# Patient Record
Sex: MALE | Race: WHITE | NOT HISPANIC OR LATINO | Employment: OTHER | URBAN - NONMETROPOLITAN AREA
[De-identification: names, ages, dates, MRNs, and addresses within clinical notes are randomized per-mention and may not be internally consistent; named-entity substitution may affect disease eponyms.]

---

## 2022-03-24 ENCOUNTER — TELEPHONE (OUTPATIENT)
Dept: FAMILY MEDICINE CLINIC | Facility: CLINIC | Age: 77
End: 2022-03-24

## 2022-03-28 DIAGNOSIS — I10 PRIMARY HYPERTENSION: Primary | ICD-10-CM

## 2022-03-28 RX ORDER — PANTOPRAZOLE SODIUM 40 MG/1
TABLET, DELAYED RELEASE ORAL
Qty: 90 TABLET | Refills: 0 | Status: SHIPPED | OUTPATIENT
Start: 2022-03-28 | End: 2022-05-03 | Stop reason: SDUPTHER

## 2022-03-28 RX ORDER — CELECOXIB 200 MG/1
CAPSULE ORAL
Qty: 60 CAPSULE | Refills: 0 | Status: SHIPPED | OUTPATIENT
Start: 2022-03-28 | End: 2022-05-03 | Stop reason: SDUPTHER

## 2022-03-28 RX ORDER — AMLODIPINE AND VALSARTAN 10; 320 MG/1; MG/1
TABLET ORAL
Qty: 30 TABLET | Refills: 0 | Status: SHIPPED | OUTPATIENT
Start: 2022-03-28 | End: 2022-05-03 | Stop reason: SDUPTHER

## 2022-05-03 ENCOUNTER — OFFICE VISIT (OUTPATIENT)
Dept: FAMILY MEDICINE CLINIC | Facility: CLINIC | Age: 77
End: 2022-05-03

## 2022-05-03 VITALS
DIASTOLIC BLOOD PRESSURE: 90 MMHG | HEIGHT: 70 IN | SYSTOLIC BLOOD PRESSURE: 150 MMHG | HEART RATE: 95 BPM | OXYGEN SATURATION: 98 % | BODY MASS INDEX: 36.94 KG/M2 | WEIGHT: 258 LBS

## 2022-05-03 DIAGNOSIS — I10 PRIMARY HYPERTENSION: ICD-10-CM

## 2022-05-03 DIAGNOSIS — R73.09 ELEVATED GLUCOSE: ICD-10-CM

## 2022-05-03 DIAGNOSIS — F32.A ANXIETY AND DEPRESSION: ICD-10-CM

## 2022-05-03 DIAGNOSIS — I25.118 CORONARY ARTERY DISEASE OF NATIVE ARTERY OF NATIVE HEART WITH STABLE ANGINA PECTORIS: ICD-10-CM

## 2022-05-03 DIAGNOSIS — M15.9 PRIMARY OSTEOARTHRITIS INVOLVING MULTIPLE JOINTS: ICD-10-CM

## 2022-05-03 DIAGNOSIS — E78.2 MIXED HYPERLIPIDEMIA: Primary | ICD-10-CM

## 2022-05-03 DIAGNOSIS — F41.9 ANXIETY AND DEPRESSION: ICD-10-CM

## 2022-05-03 DIAGNOSIS — G47.00 PERSISTENT INSOMNIA: ICD-10-CM

## 2022-05-03 DIAGNOSIS — K21.9 CHRONIC GERD: ICD-10-CM

## 2022-05-03 PROBLEM — M15.0 PRIMARY OSTEOARTHRITIS INVOLVING MULTIPLE JOINTS: Status: ACTIVE | Noted: 2022-05-03

## 2022-05-03 PROCEDURE — 99214 OFFICE O/P EST MOD 30 MIN: CPT | Performed by: PHYSICIAN ASSISTANT

## 2022-05-03 RX ORDER — CELECOXIB 200 MG/1
CAPSULE ORAL
Qty: 90 CAPSULE | Refills: 1 | Status: SHIPPED | OUTPATIENT
Start: 2022-05-03 | End: 2022-08-19

## 2022-05-03 RX ORDER — ATORVASTATIN CALCIUM 40 MG/1
TABLET, FILM COATED ORAL
Qty: 90 TABLET | Refills: 1 | Status: SHIPPED | OUTPATIENT
Start: 2022-05-03

## 2022-05-03 RX ORDER — PANTOPRAZOLE SODIUM 40 MG/1
40 TABLET, DELAYED RELEASE ORAL DAILY
Qty: 90 TABLET | Refills: 1 | Status: SHIPPED | OUTPATIENT
Start: 2022-05-03

## 2022-05-03 RX ORDER — ACETAMINOPHEN 160 MG
2000 TABLET,DISINTEGRATING ORAL DAILY
COMMUNITY

## 2022-05-03 RX ORDER — CELECOXIB 200 MG/1
200 CAPSULE ORAL DAILY
COMMUNITY
End: 2022-05-03 | Stop reason: SDUPTHER

## 2022-05-03 RX ORDER — BUSPIRONE HYDROCHLORIDE 10 MG/1
10 TABLET ORAL EVERY 12 HOURS SCHEDULED
COMMUNITY
End: 2022-05-03 | Stop reason: SDUPTHER

## 2022-05-03 RX ORDER — SERTRALINE HYDROCHLORIDE 100 MG/1
TABLET, FILM COATED ORAL
Qty: 90 TABLET | Refills: 1 | Status: SHIPPED | OUTPATIENT
Start: 2022-05-03 | End: 2022-08-19

## 2022-05-03 RX ORDER — ATORVASTATIN CALCIUM 40 MG/1
40 TABLET, FILM COATED ORAL DAILY
COMMUNITY
End: 2022-05-03 | Stop reason: SDUPTHER

## 2022-05-03 RX ORDER — AMITRIPTYLINE HYDROCHLORIDE 25 MG/1
TABLET, FILM COATED ORAL
Qty: 90 TABLET | Refills: 1 | Status: SHIPPED | OUTPATIENT
Start: 2022-05-03

## 2022-05-03 RX ORDER — MONTELUKAST SODIUM 10 MG/1
10 TABLET ORAL DAILY
COMMUNITY

## 2022-05-03 RX ORDER — BUSPIRONE HYDROCHLORIDE 10 MG/1
TABLET ORAL
Qty: 180 TABLET | Refills: 1 | Status: SHIPPED | OUTPATIENT
Start: 2022-05-03

## 2022-05-03 RX ORDER — ASPIRIN 81 MG/1
81 TABLET, CHEWABLE ORAL DAILY
COMMUNITY

## 2022-05-03 RX ORDER — PANTOPRAZOLE SODIUM 40 MG/1
40 TABLET, DELAYED RELEASE ORAL DAILY
COMMUNITY
End: 2022-05-03 | Stop reason: SDUPTHER

## 2022-05-03 RX ORDER — AMLODIPINE AND VALSARTAN 10; 320 MG/1; MG/1
TABLET ORAL
Qty: 90 TABLET | Refills: 1 | Status: SHIPPED | OUTPATIENT
Start: 2022-05-03

## 2022-05-03 RX ORDER — AMITRIPTYLINE HYDROCHLORIDE 10 MG/1
10 TABLET, FILM COATED ORAL DAILY
COMMUNITY
Start: 2022-01-31 | End: 2022-05-03 | Stop reason: SDUPTHER

## 2022-05-03 NOTE — PROGRESS NOTES
"Chief Complaint  Hypertension (Patient is needing a refill on medication)    Subjective          Kerrie Medrano presents to [facility]  Patient reports today for medication refills and bloodwork.    Patient reports HTN and elevated cholesterol states he takes his meds daily however does not check BP regularly.    Patient reports having arthritis states PRN celebrex helps request refills. Reports duloxetine for pain and mood and would like to continue    Patient also reports GERD states it is well contirolled.      Objective   Vital Signs:   /90 (BP Location: Right arm, Patient Position: Sitting, Cuff Size: Adult)   Pulse 95   Ht 177.8 cm (70\")   Wt 117 kg (258 lb)   SpO2 98%   BMI 37.02 kg/m²     Physical Exam  Vitals and nursing note reviewed.   Constitutional:       General: He is not in acute distress.     Appearance: He is obese.   HENT:      Head: Normocephalic.      Right Ear: Hearing normal.      Left Ear: Hearing normal.   Eyes:      Pupils: Pupils are equal, round, and reactive to light.   Cardiovascular:      Rate and Rhythm: Normal rate and regular rhythm.   Pulmonary:      Effort: Pulmonary effort is normal. No respiratory distress.   Skin:     General: Skin is warm and dry.   Neurological:      Mental Status: He is alert.   Psychiatric:         Mood and Affect: Mood normal.        Result Review :                 Assessment and Plan    Diagnoses and all orders for this visit:    1. Mixed hyperlipidemia (Primary)  Assessment & Plan:  Continue current treatment plan    Orders:  -     atorvastatin (LIPITOR) 40 MG tablet; Take 1 tab po daily for cholesterol  Dispense: 90 tablet; Refill: 1    2. Primary hypertension  Assessment & Plan:  Hypertension is elevated this date advised patient to start monitoring at home and RTC should it remain elevated    Orders:  -     amLODIPine-valsartan (EXFORGE)  MG per tablet; Take 1 tab po daily for blood pressure  Dispense: 90 " tablet; Refill: 1  -     CBC & Differential; Future  -     Comprehensive Metabolic Panel; Future  -     TSH; Future  -     Lipid Panel; Future  -     CBC & Differential  -     Comprehensive Metabolic Panel  -     TSH  -     Lipid Panel    3. Anxiety and depression  Assessment & Plan:  Patient's depression is recurrent and stable will continue current treatment plan    Orders:  -     sertraline (Zoloft) 100 MG tablet; Take 1 tab po daily for mood  Dispense: 90 tablet; Refill: 1  -     busPIRone (BUSPAR) 10 MG tablet; Take 1 tab po Bid for mood  Dispense: 180 tablet; Refill: 1  -     amitriptyline (ELAVIL) 25 MG tablet; Take 1 tab po QHS for sleep, caution sedation.  Dispense: 90 tablet; Refill: 1    4. Persistent insomnia  Assessment & Plan:  Continue current treatment plan    Orders:  -     amitriptyline (ELAVIL) 25 MG tablet; Take 1 tab po QHS for sleep, caution sedation.  Dispense: 90 tablet; Refill: 1    5. Chronic GERD  Assessment & Plan:  Continue current treatment plan    Orders:  -     pantoprazole (PROTONIX) 40 MG EC tablet; Take 1 tablet by mouth Daily.  Dispense: 90 tablet; Refill: 1    6. Primary osteoarthritis involving multiple joints  Assessment & Plan:  Continue current treatment plan    Orders:  -     celecoxib (CeleBREX) 200 MG capsule; Take 1 tab po daily with food for arthritis  Dispense: 90 capsule; Refill: 1    7. Elevated glucose  Assessment & Plan:  Will check A1c today    Orders:  -     Hemoglobin A1c; Future  -     Hemoglobin A1c    8. Coronary artery disease of native artery of native heart with stable angina pectoris (HCC)  Assessment & Plan:  Continue current treatment plan               Follow Up   No follow-ups on file.  Patient was given instructions and counseling regarding his condition or for health maintenance advice. Please see specific information pulled into the AVS if appropriate.

## 2022-05-04 LAB
ALBUMIN SERPL-MCNC: 4.4 G/DL (ref 3.7–4.7)
ALBUMIN/GLOB SERPL: 1.8 {RATIO} (ref 1.2–2.2)
ALP SERPL-CCNC: 89 IU/L (ref 44–121)
ALT SERPL-CCNC: 35 IU/L (ref 0–44)
AST SERPL-CCNC: 32 IU/L (ref 0–40)
BASOPHILS # BLD AUTO: 0.1 X10E3/UL (ref 0–0.2)
BASOPHILS NFR BLD AUTO: 1 %
BILIRUB SERPL-MCNC: 0.9 MG/DL (ref 0–1.2)
BUN SERPL-MCNC: 11 MG/DL (ref 8–27)
BUN/CREAT SERPL: 11 (ref 10–24)
CALCIUM SERPL-MCNC: 9.5 MG/DL (ref 8.6–10.2)
CHLORIDE SERPL-SCNC: 103 MMOL/L (ref 96–106)
CHOLEST SERPL-MCNC: 213 MG/DL (ref 100–199)
CO2 SERPL-SCNC: 20 MMOL/L (ref 20–29)
CREAT SERPL-MCNC: 0.99 MG/DL (ref 0.76–1.27)
EGFRCR SERPLBLD CKD-EPI 2021: 79 ML/MIN/1.73
EOSINOPHIL # BLD AUTO: 0.6 X10E3/UL (ref 0–0.4)
EOSINOPHIL NFR BLD AUTO: 11 %
ERYTHROCYTE [DISTWIDTH] IN BLOOD BY AUTOMATED COUNT: 13.1 % (ref 11.6–15.4)
GLOBULIN SER CALC-MCNC: 2.4 G/DL (ref 1.5–4.5)
GLUCOSE SERPL-MCNC: 123 MG/DL (ref 65–99)
HBA1C MFR BLD: 6.2 % (ref 4.8–5.6)
HCT VFR BLD AUTO: 48.4 % (ref 37.5–51)
HDLC SERPL-MCNC: 46 MG/DL
HGB BLD-MCNC: 16.7 G/DL (ref 13–17.7)
IMM GRANULOCYTES # BLD AUTO: 0 X10E3/UL (ref 0–0.1)
IMM GRANULOCYTES NFR BLD AUTO: 0 %
LDLC SERPL CALC-MCNC: 134 MG/DL (ref 0–99)
LYMPHOCYTES # BLD AUTO: 1.6 X10E3/UL (ref 0.7–3.1)
LYMPHOCYTES NFR BLD AUTO: 27 %
MCH RBC QN AUTO: 30.4 PG (ref 26.6–33)
MCHC RBC AUTO-ENTMCNC: 34.5 G/DL (ref 31.5–35.7)
MCV RBC AUTO: 88 FL (ref 79–97)
MONOCYTES # BLD AUTO: 0.6 X10E3/UL (ref 0.1–0.9)
MONOCYTES NFR BLD AUTO: 11 %
NEUTROPHILS # BLD AUTO: 2.9 X10E3/UL (ref 1.4–7)
NEUTROPHILS NFR BLD AUTO: 50 %
PLATELET # BLD AUTO: 164 X10E3/UL (ref 150–450)
POTASSIUM SERPL-SCNC: 4.2 MMOL/L (ref 3.5–5.2)
PROT SERPL-MCNC: 6.8 G/DL (ref 6–8.5)
RBC # BLD AUTO: 5.49 X10E6/UL (ref 4.14–5.8)
SODIUM SERPL-SCNC: 140 MMOL/L (ref 134–144)
TRIGL SERPL-MCNC: 184 MG/DL (ref 0–149)
TSH SERPL DL<=0.005 MIU/L-ACNC: 2.9 UIU/ML (ref 0.45–4.5)
VLDLC SERPL CALC-MCNC: 33 MG/DL (ref 5–40)
WBC # BLD AUTO: 5.8 X10E3/UL (ref 3.4–10.8)

## 2022-05-06 NOTE — ASSESSMENT & PLAN NOTE
Hypertension is elevated this date advised patient to start monitoring at home and RTC should it remain elevated

## 2022-08-19 DIAGNOSIS — M15.9 PRIMARY OSTEOARTHRITIS INVOLVING MULTIPLE JOINTS: ICD-10-CM

## 2022-08-19 DIAGNOSIS — F32.A ANXIETY AND DEPRESSION: ICD-10-CM

## 2022-08-19 DIAGNOSIS — F41.9 ANXIETY AND DEPRESSION: ICD-10-CM

## 2022-08-19 RX ORDER — SERTRALINE HYDROCHLORIDE 100 MG/1
TABLET, FILM COATED ORAL
Qty: 90 TABLET | Refills: 1 | Status: SHIPPED | OUTPATIENT
Start: 2022-08-19

## 2022-08-19 RX ORDER — CELECOXIB 200 MG/1
CAPSULE ORAL
Qty: 90 CAPSULE | Refills: 1 | Status: SHIPPED | OUTPATIENT
Start: 2022-08-19

## 2023-01-30 ENCOUNTER — EXTERNAL PBMM DATA (OUTPATIENT)
Dept: PHARMACY | Facility: OTHER | Age: 78
End: 2023-01-30
Payer: MEDICARE

## 2023-02-27 ENCOUNTER — EXTERNAL PBMM DATA (OUTPATIENT)
Dept: PHARMACY | Facility: OTHER | Age: 78
End: 2023-02-27
Payer: MEDICARE

## 2023-06-07 DIAGNOSIS — M15.9 PRIMARY OSTEOARTHRITIS INVOLVING MULTIPLE JOINTS: ICD-10-CM

## 2023-06-07 DIAGNOSIS — F32.A ANXIETY AND DEPRESSION: ICD-10-CM

## 2023-06-07 DIAGNOSIS — F41.9 ANXIETY AND DEPRESSION: ICD-10-CM

## 2023-06-07 RX ORDER — SERTRALINE HYDROCHLORIDE 100 MG/1
TABLET, FILM COATED ORAL
Qty: 30 TABLET | Refills: 0 | Status: SHIPPED | OUTPATIENT
Start: 2023-06-07

## 2023-06-07 RX ORDER — CELECOXIB 200 MG/1
CAPSULE ORAL
Qty: 30 CAPSULE | Refills: 0 | Status: SHIPPED | OUTPATIENT
Start: 2023-06-07

## 2023-06-07 NOTE — TELEPHONE ENCOUNTER
Caller: SIRI SELF    Relationship: Emergency Contact    Best call back number: [phone]    Requested Prescriptions:   Requested Prescriptions     Pending Prescriptions Disp Refills    sertraline (ZOLOFT) 100 MG tablet 90 tablet 1     Sig: TAKE ONE TABLET BY MOUTH DAILY FOR MOOD    celecoxib (CeleBREX) 200 MG capsule 90 capsule 1     Sig: TAKE ONE CAPSULE BY MOUTH DAILY WITH FOOD FOR ARTHRITIS        Pharmacy where request should be sent:  [id number] - [place], [place] - [address] AT [facility] [address] - [phone] [facility] [phone] FX        Last office visit with prescribing clinician: Visit date not found   Last telemedicine visit with prescribing clinician: Visit date not found   Next office visit with prescribing clinician: Visit date not found         Does the patient have less than a 3 day supply:  [x] Yes  [] No    Would you like a call back once the refill request has been completed: [] Yes [x] No    If the office needs to give you a call back, can they leave a voicemail: [] Yes [x] No    Dez Richardson   06/07/23 10:09 EDT

## 2023-07-03 PROBLEM — F32.A ANXIETY AND DEPRESSION: Chronic | Status: ACTIVE | Noted: 2022-05-03

## 2023-07-03 PROBLEM — E66.01 CLASS 3 SEVERE OBESITY DUE TO EXCESS CALORIES WITH SERIOUS COMORBIDITY AND BODY MASS INDEX (BMI) OF 40.0 TO 44.9 IN ADULT: Status: ACTIVE | Noted: 2023-07-03

## 2023-07-03 PROBLEM — R73.9 HYPERGLYCEMIA: Status: ACTIVE | Noted: 2023-07-03

## 2023-07-03 PROBLEM — G47.00 PERSISTENT INSOMNIA: Chronic | Status: ACTIVE | Noted: 2022-05-03

## 2023-07-03 PROBLEM — M15.9 PRIMARY OSTEOARTHRITIS INVOLVING MULTIPLE JOINTS: Chronic | Status: ACTIVE | Noted: 2022-05-03

## 2023-07-03 PROBLEM — I10 PRIMARY HYPERTENSION: Chronic | Status: ACTIVE | Noted: 2022-05-03

## 2023-07-03 PROBLEM — Z12.11 SCREENING FOR COLON CANCER: Status: ACTIVE | Noted: 2023-07-03

## 2023-07-03 PROBLEM — K21.9 CHRONIC GERD: Chronic | Status: ACTIVE | Noted: 2022-05-03

## 2023-07-03 PROBLEM — R73.9 HYPERGLYCEMIA: Chronic | Status: ACTIVE | Noted: 2023-07-03

## 2023-07-03 PROBLEM — R73.09 ELEVATED GLUCOSE: Status: RESOLVED | Noted: 2022-05-03 | Resolved: 2023-07-03

## 2023-07-03 PROBLEM — I25.118 CORONARY ARTERY DISEASE OF NATIVE ARTERY OF NATIVE HEART WITH STABLE ANGINA PECTORIS: Chronic | Status: ACTIVE | Noted: 2022-05-03

## 2023-07-03 PROBLEM — E66.813 CLASS 3 SEVERE OBESITY DUE TO EXCESS CALORIES WITH SERIOUS COMORBIDITY AND BODY MASS INDEX (BMI) OF 40.0 TO 44.9 IN ADULT: Status: ACTIVE | Noted: 2023-07-03

## 2023-07-03 PROBLEM — F41.9 ANXIETY AND DEPRESSION: Chronic | Status: ACTIVE | Noted: 2022-05-03

## 2023-07-03 PROBLEM — Z12.5 PROSTATE CANCER SCREENING: Status: ACTIVE | Noted: 2023-07-03

## 2023-07-03 PROBLEM — E78.2 MIXED HYPERLIPIDEMIA: Chronic | Status: ACTIVE | Noted: 2022-05-03

## 2023-07-03 PROBLEM — Z00.00 GENERAL MEDICAL EXAM: Status: ACTIVE | Noted: 2023-07-03

## 2023-07-03 PROBLEM — Z11.59 NEED FOR HEPATITIS C SCREENING TEST: Status: ACTIVE | Noted: 2023-07-03

## 2023-07-03 PROBLEM — M15.0 PRIMARY OSTEOARTHRITIS INVOLVING MULTIPLE JOINTS: Chronic | Status: ACTIVE | Noted: 2022-05-03

## 2023-07-31 ENCOUNTER — OFFICE VISIT (OUTPATIENT)
Dept: FAMILY MEDICINE CLINIC | Facility: CLINIC | Age: 78
End: 2023-07-31
Payer: MEDICARE

## 2023-07-31 VITALS
OXYGEN SATURATION: 93 % | HEART RATE: 70 BPM | BODY MASS INDEX: 40.12 KG/M2 | HEIGHT: 71 IN | DIASTOLIC BLOOD PRESSURE: 82 MMHG | WEIGHT: 286.6 LBS | SYSTOLIC BLOOD PRESSURE: 150 MMHG

## 2023-07-31 DIAGNOSIS — G89.29 CHRONIC PAIN OF RIGHT KNEE: ICD-10-CM

## 2023-07-31 DIAGNOSIS — M25.561 CHRONIC PAIN OF RIGHT KNEE: ICD-10-CM

## 2023-07-31 DIAGNOSIS — E66.01 CLASS 3 SEVERE OBESITY DUE TO EXCESS CALORIES WITH SERIOUS COMORBIDITY AND BODY MASS INDEX (BMI) OF 40.0 TO 44.9 IN ADULT: ICD-10-CM

## 2023-07-31 DIAGNOSIS — E11.9 TYPE 2 DIABETES MELLITUS WITHOUT COMPLICATION, WITHOUT LONG-TERM CURRENT USE OF INSULIN: Primary | ICD-10-CM

## 2023-07-31 DIAGNOSIS — G47.00 PERSISTENT INSOMNIA: ICD-10-CM

## 2023-07-31 DIAGNOSIS — I10 PRIMARY HYPERTENSION: Chronic | ICD-10-CM

## 2023-07-31 DIAGNOSIS — E78.2 MIXED HYPERLIPIDEMIA: Chronic | ICD-10-CM

## 2023-07-31 DIAGNOSIS — I25.118 CORONARY ARTERY DISEASE OF NATIVE ARTERY OF NATIVE HEART WITH STABLE ANGINA PECTORIS: Chronic | ICD-10-CM

## 2023-07-31 LAB
EXPIRATION DATE: NORMAL
Lab: NORMAL
POC MICROALBUMIN URINE: NORMAL

## 2023-07-31 PROCEDURE — 1160F RVW MEDS BY RX/DR IN RCRD: CPT | Performed by: FAMILY MEDICINE

## 2023-07-31 PROCEDURE — 3079F DIAST BP 80-89 MM HG: CPT | Performed by: FAMILY MEDICINE

## 2023-07-31 PROCEDURE — 3077F SYST BP >= 140 MM HG: CPT | Performed by: FAMILY MEDICINE

## 2023-07-31 PROCEDURE — 1159F MED LIST DOCD IN RCRD: CPT | Performed by: FAMILY MEDICINE

## 2023-07-31 PROCEDURE — 82044 UR ALBUMIN SEMIQUANTITATIVE: CPT | Performed by: FAMILY MEDICINE

## 2023-07-31 PROCEDURE — 99214 OFFICE O/P EST MOD 30 MIN: CPT | Performed by: FAMILY MEDICINE

## 2023-07-31 RX ORDER — METFORMIN HYDROCHLORIDE 500 MG/1
1000 TABLET, EXTENDED RELEASE ORAL
Qty: 180 TABLET | Refills: 1 | Status: SHIPPED | OUTPATIENT
Start: 2023-07-31

## 2023-07-31 RX ORDER — TRAZODONE HYDROCHLORIDE 50 MG/1
50-150 TABLET ORAL NIGHTLY
Qty: 90 TABLET | Refills: 3 | Status: SHIPPED | OUTPATIENT
Start: 2023-07-31

## 2023-08-15 ENCOUNTER — OFFICE VISIT (OUTPATIENT)
Dept: ORTHOPEDIC SURGERY | Facility: CLINIC | Age: 78
End: 2023-08-15
Payer: MEDICARE

## 2023-08-15 VITALS
SYSTOLIC BLOOD PRESSURE: 125 MMHG | WEIGHT: 286.6 LBS | BODY MASS INDEX: 40.12 KG/M2 | DIASTOLIC BLOOD PRESSURE: 84 MMHG | HEIGHT: 71 IN

## 2023-08-15 DIAGNOSIS — M17.11 PRIMARY OSTEOARTHRITIS OF RIGHT KNEE: ICD-10-CM

## 2023-08-15 DIAGNOSIS — M25.561 RIGHT KNEE PAIN, UNSPECIFIED CHRONICITY: Primary | ICD-10-CM

## 2023-08-15 DIAGNOSIS — E66.01 OBESITY, MORBID, BMI 40.0-49.9: ICD-10-CM

## 2023-08-15 NOTE — PROGRESS NOTES
[facility] Office Visit     Office Visit       Date: 08/15/2023   Patient Name: Kerrie Medrano  MRN: [id number]  YOB: 1945    Referring Physician: Jermaine Lopez,*     Chief Complaint:   Chief Complaint   Patient presents with    Right Knee - Pain, Initial Evaluation       History of Present Illness:   Kerrie Medrano is a 77 y.o. male who presents with right knee pain for 1 year(s). Onset atraumatic and gradual in nature. Pain is localized to the medial joint line, lateral joint line and is a 8/10 on the pain scale. Pain is described as aching. Associated symptoms include pain and popping. The pain is worse with walking, climbing stairs, lying on affected side, and rising from seated position; resting make it better. Previous treatments have included: cane/walker and NSAIDS since symptom onset. Although some transient relief was reported with these interventions, these conservative measures have failed and symptoms have persisted. The patient is limited in daily activities and has had a significant decrease in quality of life as a result. He denies fevers, chills, or constitutional symptoms. Patient is a not a smoker, chews tobacco.     Subjective   Review of Systems: Review of Systems   Constitutional:  Negative for chills, fever, unexpected weight gain and unexpected weight loss.   HENT:  Negative for congestion, postnasal drip and rhinorrhea.    Eyes:  Negative for blurred vision.   Respiratory:  Negative for shortness of breath.    Cardiovascular:  Negative for leg swelling.   Gastrointestinal:  Negative for abdominal pain, nausea and vomiting.   Genitourinary:  Negative for difficulty urinating.   Musculoskeletal:  Positive for arthralgias. Negative for gait problem, joint swelling and myalgias.   Skin:  Negative for skin lesions and wound.   Neurological:  Negative for dizziness, weakness, light-headedness and numbness.   Hematological:   Does not bruise/bleed easily.   Psychiatric/Behavioral:  Negative for depressed mood.    All other systems reviewed and are negative.     I have reviewed the following portions of the patient's history:History of Present Illness and review of systems.    Past Medical History:   Past Medical History:   Diagnosis Date    Benign essential hypertension     Disorder of hair and hair follicles     Joint pain        Past Surgical History:   Past Surgical History:   Procedure Laterality Date    APPENDECTOMY  1960    COLONOSCOPY  04/2009    COLON POLYP-20 CM INFLAMMATORY POLYP, PLAN: REPEAT IN 5 YEARS, NO FH OF COLON CANCER    KNEE SURGERY      ARTHROCENTESIS OF THE RIGHT KNEE MEDIAL JOINT LINE  AND LEFT KNEE    SKIN CANCER EXCISION         Family History:   Family History   Problem Relation Age of Onset    Heart attack Mother     Heart attack Father     Skin cancer Brother 50    Heart attack Brother         X3       Social History:   Social History     Socioeconomic History    Marital status:    Tobacco Use    Smoking status: Never     Passive exposure: Never    Smokeless tobacco: Never   Vaping Use    Vaping Use: Never used   Substance and Sexual Activity    Alcohol use: Never    Drug use: Never    Sexual activity: Defer       Medications:   Current Outpatient Medications:     amLODIPine-valsartan (EXFORGE)  MG per tablet, Take 1 tablet by mouth Daily. Take 1 tab po daily for blood pressure, Disp: 90 tablet, Rfl: 1    aspirin 81 MG chewable tablet, Chew 1 tablet Daily., Disp: , Rfl:     atorvastatin (LIPITOR) 40 MG tablet, Take 1 tab po daily for cholesterol, Disp: 90 tablet, Rfl: 1    busPIRone (BUSPAR) 10 MG tablet, Take 1 tab po Bid for mood, Disp: 180 tablet, Rfl: 1    celecoxib (CeleBREX) 200 MG capsule, TAKE ONE CAPSULE BY MOUTH DAILY WITH FOOD FOR ARTHRITIS, Disp: 90 capsule, Rfl: 1    Cholecalciferol (Vitamin D3) 50 MCG (2000 UT) capsule, Take 1 capsule by mouth Daily., Disp: , Rfl:     metFORMIN ER  "(GLUCOPHAGE-XR) 500 MG 24 hr tablet, Take 2 tablets by mouth Daily With Dinner., Disp: 180 tablet, Rfl: 1    montelukast (SINGULAIR) 10 MG tablet, Take 1 tablet by mouth Every Night., Disp: 90 tablet, Rfl: 1    pantoprazole (PROTONIX) 40 MG EC tablet, Take 1 tablet by mouth Daily., Disp: 90 tablet, Rfl: 1    sertraline (ZOLOFT) 100 MG tablet, TAKE ONE TABLET BY MOUTH DAILY FOR MOOD, Disp: 90 tablet, Rfl: 1    traZODone (DESYREL) 50 MG tablet, Take 1-3 tablets by mouth Every Night. For insomnia (Replaces elavil), Disp: 90 tablet, Rfl: 3    Trelegy Ellipta 100-62.5-25 MCG/INH inhaler, , Disp: , Rfl:     Allergies: No Known Allergies    I reviewed the patient's chief complaint, history of present illness, review of systems, past medical history, surgical history, family history, social history, medications and allergy list.     Objective    Vital Signs:   Vitals:    08/15/23 1512   BP: 125/84   Weight: 130 kg (286 lb 9.6 oz)   Height: 179.1 cm (70.51\")     Body mass index is 40.53 kg/mý. Class 3 Severe Obesity (BMI >=40). Obesity-related health conditions include the following: hypertension, coronary heart disease, diabetes mellitus, and dyslipidemias. Obesity is newly identified. BMI is is above average; BMI management plan is completed. We discussed portion control and increasing exercise.     Patient reports that he is a non-smoker and has not ever been a smoker.  This behavior was applauded and he was encouraged to continue in smoking cessation.  We will continue to monitor at subsequent visits.     Ortho Exam:  right knee: No erythema, ecchymosis, swelling.  Tender to palpation over the medial joint line.  Full range of motion in flexion extension but pain is experienced with deep knee flexion.  Can get to 0 degrees extension, 110 degrees in flexion.  Stable to varus and valgus stress.  Negative anterior posterior drawer.  Negative Keila's.  Sensation intact to light touch.  5/5 strength.  2+ posterior tibial " pulse.    Results Review:   Imaging Results (Last 24 Hours)       Procedure Component Value Units Date/Time    XR Knee 4+ View Right [[id number]] Resulted: 08/15/23 1456     Updated: 08/15/23 1511        I personally reviewed the radiographs of the right knee.  No acute fracture or dislocation.  There is tricompartmental osteoarthritis with severe narrowing of the medial joint space.    Procedures    Assessment / Plan    Assessment/Plan:   Diagnoses and all orders for this visit:    1. Right knee pain, unspecified chronicity (Primary)  -     XR Knee 4+ View Right    2. Primary osteoarthritis of right knee    3. Obesity, morbid, BMI 40.0-49.9      Worsening right knee pain for the last 1 year.  He localizes pain along the medial joint line.  His radiographs show severe narrowing of the medial joint space.  He has had the left knee replaced about 6 years ago.  He has had a corticosteroid injection about 1 year ago that only provided 3 to 4 days of relief.  Celebrex has not been helping either.  I provided him with a copy of his radiographs and we discussed them in detail.  I have laid out for him a treatment plan from both the nonoperative and operative standpoint.  Based off the severity of his knee arthritis, I am less than optimistic that continued injections with steroid or hyaluronic acid, medications, bracing, or physical therapy would significantly alter his pain level at this time.  I did discuss with him that total knee arthroplasty would be the only way to fully correct the arthritis.  His BMI is currently greater than 40 and that might limit his ability to have the procedure.  I encouraged him to watch his weight and do his best to get below that 40 threshold.  After this discussion, he has elected to be referred for surgical evaluation.  I will arrange for him to see Dr. Whitley in follow-up.  Should he decide to not proceed with surgery, I am happy to see him back for nonoperative management.    Previous  documentation reviewed: 7/31/2023-Jermaine Lopez MD-office visit.    Previous laboratory results reviewed: 7/3/2023-creatinine 1.04, EGFR 74.  Hemoglobin A1c 7.0%.    Follow Up:   Return for F/U with Angi.      Butch Kim MD  [facility]

## 2023-08-24 ENCOUNTER — PREP FOR SURGERY (OUTPATIENT)
Dept: OTHER | Facility: HOSPITAL | Age: 78
End: 2023-08-24
Payer: MEDICARE

## 2023-08-24 ENCOUNTER — OFFICE VISIT (OUTPATIENT)
Dept: ORTHOPEDIC SURGERY | Facility: CLINIC | Age: 78
End: 2023-08-24
Payer: MEDICARE

## 2023-08-24 VITALS — SYSTOLIC BLOOD PRESSURE: 142 MMHG | DIASTOLIC BLOOD PRESSURE: 78 MMHG

## 2023-08-24 DIAGNOSIS — M17.11 PRIMARY OSTEOARTHRITIS OF RIGHT KNEE: Primary | ICD-10-CM

## 2023-08-24 DIAGNOSIS — E11.9 TYPE 2 DIABETES MELLITUS WITHOUT COMPLICATION, WITHOUT LONG-TERM CURRENT USE OF INSULIN: Chronic | ICD-10-CM

## 2023-08-24 DIAGNOSIS — E66.01 CLASS 3 SEVERE OBESITY WITH BODY MASS INDEX (BMI) OF 40.0 TO 44.9 IN ADULT, UNSPECIFIED OBESITY TYPE, UNSPECIFIED WHETHER SERIOUS COMORBIDITY PRESENT: ICD-10-CM

## 2023-08-24 PROBLEM — M17.9 OA (OSTEOARTHRITIS) OF KNEE: Status: ACTIVE | Noted: 2023-08-24

## 2023-08-24 RX ORDER — OXYCODONE HCL 10 MG/1
10 TABLET, FILM COATED, EXTENDED RELEASE ORAL ONCE
OUTPATIENT
Start: 2023-08-24 | End: 2023-08-24

## 2023-08-24 RX ORDER — ACETAMINOPHEN 500 MG
1000 TABLET ORAL ONCE
OUTPATIENT
Start: 2023-08-24 | End: 2023-08-24

## 2023-08-24 RX ORDER — TRANEXAMIC ACID 10 MG/ML
1000 INJECTION, SOLUTION INTRAVENOUS ONCE
OUTPATIENT
Start: 2023-08-24 | End: 2023-08-24

## 2023-08-24 RX ORDER — CEFAZOLIN SODIUM IN 0.9 % NACL 3 G/100 ML
3 INTRAVENOUS SOLUTION, PIGGYBACK (ML) INTRAVENOUS ONCE
OUTPATIENT
Start: 2023-08-24 | End: 2023-08-24

## 2023-08-24 RX ORDER — CHLORHEXIDINE GLUCONATE 4 G/100ML
SOLUTION TOPICAL DAILY
Qty: 236 ML | Refills: 0 | Status: SHIPPED | OUTPATIENT
Start: 2023-08-24

## 2023-08-24 NOTE — PROGRESS NOTES
[facility] Note        Subjective     Pain of the Right Knee      HPI    Kerrie Medrano is a 77 y.o. male.  Patient is here today with his daughter and grandson for evaluation of right knee pain.  He is referred by Dr. Kim.  Patient tells me that he has significant pain with walking.  He underwent left TKA by Dr. Red in [place] about 6 years ago he estimates.  He is a 1 pouch a day chewer of tobacco.  He does not smoke cigarettes.  He is on oxygen at night.  He is a non-insulin-dependent diabetic.  Patient is here today to discuss possible arthroplasty.  He has failed steroid and viscosupplementation injections.  He lives by himself.  Has a daughter that lives in [facility] who is accompanying him today.  Also has another daughter that lives a few doors away from him.        Past Medical History:   Diagnosis Date    Benign essential hypertension     Disorder of hair and hair follicles     Joint pain       Past Surgical History:   Procedure Laterality Date    APPENDECTOMY  1960    COLONOSCOPY  04/2009    COLON POLYP-20 CM INFLAMMATORY POLYP, PLAN: REPEAT IN 5 YEARS, NO FH OF COLON CANCER    KNEE SURGERY      ARTHROCENTESIS OF THE RIGHT KNEE MEDIAL JOINT LINE  AND LEFT KNEE    SKIN CANCER EXCISION        Family History   Problem Relation Age of Onset    Heart attack Mother     Heart attack Father     Skin cancer Brother 50    Heart attack Brother         X3     Social History     Socioeconomic History    Marital status:    Tobacco Use    Smoking status: Never     Passive exposure: Never    Smokeless tobacco: Never   Vaping Use    Vaping Use: Never used   Substance and Sexual Activity    Alcohol use: Never    Drug use: Never    Sexual activity: Defer      Current Outpatient Medications on File Prior to Visit   Medication Sig Dispense Refill    amLODIPine-valsartan (EXFORGE)  MG per tablet Take 1 tablet by mouth Daily. Take 1 tab po daily for blood pressure 90 tablet 1     aspirin 81 MG chewable tablet Chew 1 tablet Daily.      atorvastatin (LIPITOR) 40 MG tablet Take 1 tab po daily for cholesterol 90 tablet 1    busPIRone (BUSPAR) 10 MG tablet Take 1 tab po Bid for mood 180 tablet 1    celecoxib (CeleBREX) 200 MG capsule TAKE ONE CAPSULE BY MOUTH DAILY WITH FOOD FOR ARTHRITIS 90 capsule 1    Cholecalciferol (Vitamin D3) 50 MCG (2000 UT) capsule Take 1 capsule by mouth Daily.      metFORMIN ER (GLUCOPHAGE-XR) 500 MG 24 hr tablet Take 2 tablets by mouth Daily With Dinner. 180 tablet 1    montelukast (SINGULAIR) 10 MG tablet Take 1 tablet by mouth Every Night. 90 tablet 1    pantoprazole (PROTONIX) 40 MG EC tablet Take 1 tablet by mouth Daily. 90 tablet 1    sertraline (ZOLOFT) 100 MG tablet TAKE ONE TABLET BY MOUTH DAILY FOR MOOD 90 tablet 1    traZODone (DESYREL) 50 MG tablet Take 1-3 tablets by mouth Every Night. For insomnia (Replaces elavil) 90 tablet 3    Trelegy Ellipta 100-62.5-25 MCG/INH inhaler        No current facility-administered medications on file prior to visit.      No Known Allergies       Review of Systems   Constitutional: Negative.    HENT: Negative.     Eyes: Negative.    Respiratory: Negative.     Cardiovascular: Negative.    Gastrointestinal: Negative.    Endocrine: Negative.    Genitourinary: Negative.    Musculoskeletal: Negative.    Skin: Negative.    Allergic/Immunologic: Negative.    Neurological: Negative.    Hematological: Negative.    Psychiatric/Behavioral: Negative.        I reviewed the patient's chief complaint, history of present illness, review of systems, past medical history, surgical history, family history, social history, medications and allergy list.        Objective      Physical Exam  /78     There is no height or weight on file to calculate BMI.    General  Mental Status - alert  General Appearance - cooperative, well groomed, not in acute distress  Orientation - Oriented X3  Build & Nutrition - well developed and well  nourished  Posture - normal posture  Gait - normal gait       Ortho Exam      Musculoskeletal:  Global Assessment:  Overall assessment of Lower Extremity Muscle Strength and Tone:  Right quadriceps--5/5   Right hamstrings--5/5       Right tibialis anterior--5/5  Right gastroc-soleus--5/5  Right EHL --5/5    Lower Extremity:    Inspection and Palpation:  Right knee:  Tenderness:  Over the medial joint line and moderate severity  Effusion:  1+  Crepitus:  Positive  Pulses:  2+  Ecchymosis:  None  Warmth:  None     ROM:  Right:  Extension: 5    Flexion:120    Instability:    Right:  Lachman Test:  Negative   Varus stress test negative   Valgus stress test negative    Deformities/Malalignments/Discrepancies:    Left:  No deformities   Right:  Genu Varum    Functional Testing:  Keila's test:  Negative  Patella grind test:  Positive  Q-angle:  normal        Imaging/Studies  Imaging Results (Last 24 Hours)       ** No results found for the last 24 hours. **          XR Knee 4+ View Right  Narrative: XR KNEE 4+ VW RIGHT    Date of Exam: 8/15/2023 1:56 PM CDT    Indication: PAIN    Comparison: None available.    Findings:  No evidence of fracture nor dislocation. Alignment is normal. Mild to moderate medial patellofemoral compartment degenerative arthrosis. No significant effusion identified. Soft tissues are unremarkable.  Impression: Impression:  Mild to moderate degenerative arthrosis    Electronically Signed: Nicolás Gutierrez MD    8/18/2023 6:57 AM CDT    Workstation ID: [id number]       We have reviewed an x-ray of the patient's right knee from 8/18/2023 which shows end-stage medial compartment arthritis.  There is moderate patellofemoral compartment arthritis.    Hemoglobin A1c from 7/3/2023 is 7.0    Assessment    Assessment:  1. Primary osteoarthritis of right knee    2. Class 3 severe obesity with body mass index (BMI) of 40.0 to 44.9 in adult, unspecified obesity type, unspecified whether serious comorbidity present     3. Type 2 diabetes mellitus without complication, without long-term current use of insulin        Plan:  Continue over-the-counter medication as needed for discomfort  Severe right knee arthritis in a patient with hemoglobin A1c of 7.0, home O2 dependency at night, and history of tobacco use--patient has exhausted nonoperative treatment modalities.  He is still at significant risk for perioperative complications.  I like to go ahead and get perioperative clearance by his PCP and also by his pulmonologist Dr. Valdivia.  We will go ahead and request formal clearance from them both.  I will see the patient back in the office to answer any final questions.  We will likely do the surgery with a 23-hour stay, aspirin for DVT prophylaxis, and we will need to make sure he has appropriate arrangements postoperatively for care with his daughters.  Patient and his family was informed of the fact that robotics will be used at the time of surgery to assist.  We will get x-rays of his right knee when he returns to schedule surgery.        Trae Whitley MD  08/24/23  15:02 EDT      Dictated Utilizing Dragon Dictation.

## 2023-10-10 ENCOUNTER — OFFICE VISIT (OUTPATIENT)
Dept: FAMILY MEDICINE CLINIC | Facility: CLINIC | Age: 78
End: 2023-10-10
Payer: MEDICARE

## 2023-10-10 VITALS
HEIGHT: 71 IN | OXYGEN SATURATION: 96 % | WEIGHT: 290.7 LBS | DIASTOLIC BLOOD PRESSURE: 90 MMHG | TEMPERATURE: 98 F | SYSTOLIC BLOOD PRESSURE: 148 MMHG | HEART RATE: 96 BPM | BODY MASS INDEX: 40.7 KG/M2

## 2023-10-10 DIAGNOSIS — M17.11 PRIMARY OSTEOARTHRITIS OF RIGHT KNEE: ICD-10-CM

## 2023-10-10 DIAGNOSIS — G89.29 CHRONIC PAIN OF RIGHT KNEE: Primary | ICD-10-CM

## 2023-10-10 DIAGNOSIS — M25.561 CHRONIC PAIN OF RIGHT KNEE: Primary | ICD-10-CM

## 2023-10-10 DIAGNOSIS — Z01.810 PREOP CARDIOVASCULAR EXAM: ICD-10-CM

## 2023-10-10 DIAGNOSIS — R35.0 URINARY FREQUENCY: ICD-10-CM

## 2023-10-10 DIAGNOSIS — I10 PRIMARY HYPERTENSION: Chronic | ICD-10-CM

## 2023-10-10 DIAGNOSIS — E11.9 TYPE 2 DIABETES MELLITUS WITHOUT COMPLICATION, WITHOUT LONG-TERM CURRENT USE OF INSULIN: Chronic | ICD-10-CM

## 2023-10-10 DIAGNOSIS — R94.31 ABNORMAL EKG: ICD-10-CM

## 2023-10-10 DIAGNOSIS — E78.2 MIXED HYPERLIPIDEMIA: Chronic | ICD-10-CM

## 2023-10-10 LAB
BILIRUB BLD-MCNC: NEGATIVE MG/DL
CLARITY, POC: CLEAR
COLOR UR: YELLOW
EXPIRATION DATE: NORMAL
GLUCOSE UR STRIP-MCNC: NEGATIVE MG/DL
KETONES UR QL: NEGATIVE
LEUKOCYTE EST, POC: NEGATIVE
Lab: NORMAL
NITRITE UR-MCNC: NEGATIVE MG/ML
PH UR: 7 [PH] (ref 5–8)
PROT UR STRIP-MCNC: NEGATIVE MG/DL
RBC # UR STRIP: NEGATIVE /UL
SP GR UR: 1.01 (ref 1–1.03)
UROBILINOGEN UR QL: NORMAL

## 2023-10-10 NOTE — PROGRESS NOTES
"Chief Complaint  Pre-op Exam (Having knee replacement )    Subjective    History of Present Illness:  Kerrie Medrano is a 77 y.o. male who presents today for preop eval for R knee replacement with Dr Red.    Planned surgery 11/7/23.  He did well with L knee replacement with Dr Red in the past    No chest pain or chest pressure.  He did have pulmonology clearance with Dr. Valdivia prior to today's visit but no chest x-ray was done.  He is willing to get a chest x-ray today.    We will then get nonfasting lab work checked and has been doing better with compliance with his statin after his most recent labs did show poor lipid control.    He has not had any problems with anesthesia in the past.    He is walking with a cane given his worsening right knee osteoarthritis and blood pressure has been running a little higher due to pain.  He does understand need to stop Celebrex at least 1 week prior to surgery and avoid aspirin to prevent bleeding complications.        Objective   Vital Signs:   /90   Pulse 96   Temp 98 øF (36.7 øC)   Ht 179.1 cm (70.51\")   Wt 132 kg (290 lb 11.2 oz)   SpO2 96%   BMI 41.11 kg/mý     Review of Systems   Constitutional:  Negative for appetite change, chills and fever.   HENT:  Negative for hearing loss.    Eyes:  Negative for blurred vision.   Respiratory:  Negative for chest tightness.    Cardiovascular:  Negative for chest pain.   Gastrointestinal:  Negative for abdominal pain.   Musculoskeletal:  Positive for arthralgias and gait problem.   Skin:  Negative for rash.   Psychiatric/Behavioral:  Negative for depressed mood.        Past History:  Medical History: has a past medical history of Benign essential hypertension, Disorder of hair and hair follicles, and Joint pain.   Surgical History: has a past surgical history that includes Colonoscopy (04/2009); Skin cancer excision; Knee surgery; and Appendectomy (1960).   Family History: family history includes Heart attack in his " brother, father, and mother; Skin cancer (age of onset: 50) in his brother.   Social History: reports that he has never smoked. He has never been exposed to tobacco smoke. He has never used smokeless tobacco. He reports that he does not drink alcohol and does not use drugs.      Current Outpatient Medications:     amLODIPine-valsartan (EXFORGE)  MG per tablet, Take 1 tablet by mouth Daily. Take 1 tab po daily for blood pressure, Disp: 90 tablet, Rfl: 1    aspirin 81 MG chewable tablet, Chew 1 tablet Daily., Disp: , Rfl:     atorvastatin (LIPITOR) 40 MG tablet, Take 1 tab po daily for cholesterol, Disp: 90 tablet, Rfl: 1    busPIRone (BUSPAR) 10 MG tablet, Take 1 tab po Bid for mood, Disp: 180 tablet, Rfl: 1    celecoxib (CeleBREX) 200 MG capsule, TAKE ONE CAPSULE BY MOUTH DAILY WITH FOOD FOR ARTHRITIS, Disp: 90 capsule, Rfl: 1    chlorhexidine (HIBICLENS) 4 % external liquid, Apply  topically to the appropriate area as directed Daily. Shower w/ solution for 5 days before surgery. [facility] to [facility] to be filled., Disp: 236 mL, Rfl: 0    Cholecalciferol (Vitamin D3) 50 MCG (2000 UT) capsule, Take 1 capsule by mouth Daily., Disp: , Rfl:     metFORMIN ER (GLUCOPHAGE-XR) 500 MG 24 hr tablet, Take 2 tablets by mouth Daily With Dinner., Disp: 180 tablet, Rfl: 1    montelukast (SINGULAIR) 10 MG tablet, Take 1 tablet by mouth Every Night., Disp: 90 tablet, Rfl: 1    pantoprazole (PROTONIX) 40 MG EC tablet, Take 1 tablet by mouth Daily., Disp: 90 tablet, Rfl: 1    sertraline (ZOLOFT) 100 MG tablet, TAKE ONE TABLET BY MOUTH DAILY FOR MOOD, Disp: 90 tablet, Rfl: 1    traZODone (DESYREL) 50 MG tablet, Take 1-3 tablets by mouth Every Night. For insomnia (Replaces elavil), Disp: 90 tablet, Rfl: 3    Trelegy Ellipta 100-62.5-25 MCG/INH inhaler, , Disp: , Rfl:     Allergies: Patient has no known allergies.    Physical Exam  Constitutional:       Appearance: Normal appearance. He is obese.   HENT:      Head: Normocephalic.       Right Ear: External ear normal.      Left Ear: External ear normal.      Nose: Nose normal.   Eyes:      Pupils: Pupils are equal, round, and reactive to light.   Cardiovascular:      Rate and Rhythm: Normal rate and regular rhythm.      Heart sounds: Normal heart sounds.   Pulmonary:      Effort: Pulmonary effort is normal.      Breath sounds: Normal breath sounds.   Musculoskeletal:      Cervical back: Normal range of motion.      Comments: Worsening right knee crepitus and osteoarthritic changes.  Using cane for ambulatory support   Skin:     General: Skin is warm and dry.   Neurological:      General: No focal deficit present.      Mental Status: He is alert.   Psychiatric:         Mood and Affect: Mood normal.         Behavior: Behavior normal.         Thought Content: Thought content normal.          Result Review            ECG 12 Lead    Date/Time: 10/10/2023 2:49 PM  Performed by: Jermaine Lopez MD    Authorized by: Jermaine Lopez MD  Comparison: compared with previous ECG from 9/8/2021  Rhythm: sinus arrhythmia  Rate: normal  BPM: 96  Conduction: right bundle branch block  QRS axis: right    Clinical impression: abnormal EKG  Comments: RBBB unclear p-waves with irregularity concerning for possible new Afib.  See note.  Rate is not uncontrolled.  Continues ASA and would like to see Cardiology before full anticoagulation decision and needs preop clearance.               Assessment and Plan  Diagnoses and all orders for this visit:    1. Chronic pain of right knee (Primary)  Assessment & Plan:  Discussed worsening knee osteoarthritis and plans for total knee replacement with Dr. Red.    We did obtain preop EKG today with more pronounced right bundle su block compared to his EKG from September 2021 at [facility].    He has not seen his cardiologist in some time after Dr. Candelaria moved out of the area.  He has not had any fluttering or palpitation symptoms but we  did discuss some concerns based upon his EKG given inability to clearly identify P waves and the possibility of atrial fibrillation.  His wife did have atrial fibrillation so is familiar with A-fib concerns.    We did discuss further work-up needed with cardiology given unclear EKG with some findings concerning for atrial fibrillation versus right bundle branch block with sinus arrhythmia.    He is willing to get preop clearance with cardiology and we will work to facilitate a appointment with cardiology at the earliest available time given plans for surgery in November.    He does understand the risks and benefits of surgery as explained by his surgeon and wishes to proceed.    Awaiting cardiology clearance given abnormal preop EKG findings with some concerns for possibility of new A-fib.    Discussed if he has any chest pain or chest pressure or palpitation symptoms that he does need further evaluation in the ER.  We did discuss the unclear nature of his EKG and the option to start anticoagulation at this point he would just like to continue with his current aspirin and his rate is not uncontrolled.  He does understand stroke risk with A-fib.            2. Primary osteoarthritis of right knee  Assessment & Plan:  See above      3. Preop cardiovascular exam  Assessment & Plan:  See above     Needs cardiology eval and clearance given concerns regarding abnormalities on preop EKG    Asymptomatic     Orders:  -     CBC Auto Differential; Future  -     Comprehensive Metabolic Panel; Future  -     Lipid Panel; Future  -     Hemoglobin A1c; Future  -     XR Chest 2 View; Future  -     Ambulatory Referral to Cardiology  -     CBC Auto Differential  -     Comprehensive Metabolic Panel  -     Lipid Panel  -     Hemoglobin A1c  -     ECG 12 Lead    4. Primary hypertension  -     CBC Auto Differential; Future  -     Comprehensive Metabolic Panel; Future  -     Lipid Panel; Future  -     TSH; Future  -     T4, Free; Future  -      Magnesium; Future  -     CBC Auto Differential  -     Comprehensive Metabolic Panel  -     Lipid Panel  -     TSH  -     T4, Free  -     Magnesium    5. Mixed hyperlipidemia  -     CBC Auto Differential; Future  -     Comprehensive Metabolic Panel; Future  -     Lipid Panel; Future  -     CBC Auto Differential  -     Comprehensive Metabolic Panel  -     Lipid Panel    6. Type 2 diabetes mellitus without complication, without long-term current use of insulin  -     CBC Auto Differential; Future  -     Comprehensive Metabolic Panel; Future  -     Lipid Panel; Future  -     Hemoglobin A1c; Future  -     CBC Auto Differential  -     Comprehensive Metabolic Panel  -     Lipid Panel  -     Hemoglobin A1c    7. Urinary frequency  Assessment & Plan:  No dysuria    UA clear      Orders:  -     POCT urinalysis dipstick, automated  -     Urine Culture - Urine, Urine, Clean Catch; Future  -     Urine Culture - Urine, Urine, Clean Catch    8. Abnormal EKG  Assessment & Plan:  See above     Orders:  -     Ambulatory Referral to Cardiology  -     TSH; Future  -     T4, Free; Future  -     Magnesium; Future  -     TSH  -     T4, Free  -     Magnesium                   Follow Up  Return in about 8 weeks (around 12/4/2023), or if symptoms worsen or fail to improve, for Med recheck.    Jermaine Lopez MD

## 2023-10-10 NOTE — ASSESSMENT & PLAN NOTE
Discussed worsening knee osteoarthritis and plans for total knee replacement with Dr. Red.    We did obtain preop EKG today with more pronounced right bundle su block compared to his EKG from September 2021 at [facility].    He has not seen his cardiologist in some time after Dr. Candelaria moved out of the area.  He has not had any fluttering or palpitation symptoms but we did discuss some concerns based upon his EKG given inability to clearly identify P waves and the possibility of atrial fibrillation.  His wife did have atrial fibrillation so is familiar with A-fib concerns.    We did discuss further work-up needed with cardiology given unclear EKG with some findings concerning for atrial fibrillation versus right bundle branch block with sinus arrhythmia.    He is willing to get preop clearance with cardiology and we will work to facilitate a appointment with cardiology at the earliest available time given plans for surgery in November.    He does understand the risks and benefits of surgery as explained by his surgeon and wishes to proceed.    Awaiting cardiology clearance given abnormal preop EKG findings with some concerns for possibility of new A-fib.    Discussed if he has any chest pain or chest pressure or palpitation symptoms that he does need further evaluation in the ER.  We did discuss the unclear nature of his EKG and the option to start anticoagulation at this point he would just like to continue with his current aspirin and his rate is not uncontrolled.  He does understand stroke risk with A-fib.

## 2023-10-10 NOTE — ASSESSMENT & PLAN NOTE
See above     Needs cardiology eval and clearance given concerns regarding abnormalities on preop EKG    Asymptomatic

## 2023-10-11 ENCOUNTER — OFFICE VISIT (OUTPATIENT)
Dept: CARDIOLOGY | Facility: CLINIC | Age: 78
End: 2023-10-11
Payer: MEDICARE

## 2023-10-11 VITALS
OXYGEN SATURATION: 99 % | BODY MASS INDEX: 40.6 KG/M2 | DIASTOLIC BLOOD PRESSURE: 81 MMHG | RESPIRATION RATE: 18 BRPM | HEIGHT: 71 IN | WEIGHT: 290 LBS | HEART RATE: 102 BPM | SYSTOLIC BLOOD PRESSURE: 150 MMHG

## 2023-10-11 DIAGNOSIS — E11.9 TYPE 2 DIABETES MELLITUS WITHOUT COMPLICATION, WITHOUT LONG-TERM CURRENT USE OF INSULIN: Chronic | ICD-10-CM

## 2023-10-11 DIAGNOSIS — I48.91 ATRIAL FIBRILLATION WITH RVR: ICD-10-CM

## 2023-10-11 DIAGNOSIS — R06.09 DYSPNEA ON EXERTION: ICD-10-CM

## 2023-10-11 DIAGNOSIS — E78.2 MIXED HYPERLIPIDEMIA: Chronic | ICD-10-CM

## 2023-10-11 DIAGNOSIS — Z01.810 PRE-OPERATIVE CARDIOVASCULAR EXAMINATION: Primary | ICD-10-CM

## 2023-10-11 DIAGNOSIS — I10 PRIMARY HYPERTENSION: Chronic | ICD-10-CM

## 2023-10-11 DIAGNOSIS — I25.118 CORONARY ARTERY DISEASE OF NATIVE ARTERY OF NATIVE HEART WITH STABLE ANGINA PECTORIS: Chronic | ICD-10-CM

## 2023-10-11 PROBLEM — I45.10 RBBB: Status: ACTIVE | Noted: 2023-10-11

## 2023-10-11 LAB
ALBUMIN SERPL-MCNC: 4.2 G/DL (ref 3.8–4.8)
ALBUMIN/GLOB SERPL: 1.5 {RATIO} (ref 1.2–2.2)
ALP SERPL-CCNC: 88 IU/L (ref 44–121)
ALT SERPL-CCNC: 19 IU/L (ref 0–44)
AST SERPL-CCNC: 20 IU/L (ref 0–40)
BACTERIA UR CULT: NORMAL
BACTERIA UR CULT: NORMAL
BASOPHILS # BLD AUTO: 0.1 X10E3/UL (ref 0–0.2)
BASOPHILS NFR BLD AUTO: 1 %
BILIRUB SERPL-MCNC: 1 MG/DL (ref 0–1.2)
BUN SERPL-MCNC: 14 MG/DL (ref 8–27)
BUN/CREAT SERPL: 13 (ref 10–24)
CALCIUM SERPL-MCNC: 9.1 MG/DL (ref 8.6–10.2)
CHLORIDE SERPL-SCNC: 102 MMOL/L (ref 96–106)
CHOLEST SERPL-MCNC: 188 MG/DL (ref 100–199)
CO2 SERPL-SCNC: 22 MMOL/L (ref 20–29)
CREAT SERPL-MCNC: 1.11 MG/DL (ref 0.76–1.27)
EGFRCR SERPLBLD CKD-EPI 2021: 69 ML/MIN/1.73
EOSINOPHIL # BLD AUTO: 0.3 X10E3/UL (ref 0–0.4)
EOSINOPHIL NFR BLD AUTO: 5 %
ERYTHROCYTE [DISTWIDTH] IN BLOOD BY AUTOMATED COUNT: 13.2 % (ref 11.6–15.4)
GLOBULIN SER CALC-MCNC: 2.8 G/DL (ref 1.5–4.5)
GLUCOSE SERPL-MCNC: 116 MG/DL (ref 70–99)
HBA1C MFR BLD: 7 % (ref 4.8–5.6)
HCT VFR BLD AUTO: 48.7 % (ref 37.5–51)
HDLC SERPL-MCNC: 49 MG/DL
HGB BLD-MCNC: 16.2 G/DL (ref 13–17.7)
IMM GRANULOCYTES # BLD AUTO: 0 X10E3/UL (ref 0–0.1)
IMM GRANULOCYTES NFR BLD AUTO: 0 %
LDLC SERPL CALC-MCNC: 118 MG/DL (ref 0–99)
LYMPHOCYTES # BLD AUTO: 1.8 X10E3/UL (ref 0.7–3.1)
LYMPHOCYTES NFR BLD AUTO: 28 %
MAGNESIUM SERPL-MCNC: 2.2 MG/DL (ref 1.6–2.3)
MCH RBC QN AUTO: 29.5 PG (ref 26.6–33)
MCHC RBC AUTO-ENTMCNC: 33.3 G/DL (ref 31.5–35.7)
MCV RBC AUTO: 89 FL (ref 79–97)
MONOCYTES # BLD AUTO: 0.6 X10E3/UL (ref 0.1–0.9)
MONOCYTES NFR BLD AUTO: 9 %
NEUTROPHILS # BLD AUTO: 3.8 X10E3/UL (ref 1.4–7)
NEUTROPHILS NFR BLD AUTO: 57 %
PLATELET # BLD AUTO: 142 X10E3/UL (ref 150–450)
POTASSIUM SERPL-SCNC: 4.5 MMOL/L (ref 3.5–5.2)
PROT SERPL-MCNC: 7 G/DL (ref 6–8.5)
RBC # BLD AUTO: 5.5 X10E6/UL (ref 4.14–5.8)
SODIUM SERPL-SCNC: 140 MMOL/L (ref 134–144)
T4 FREE SERPL-MCNC: 1.1 NG/DL (ref 0.82–1.77)
TRIGL SERPL-MCNC: 114 MG/DL (ref 0–149)
TSH SERPL DL<=0.005 MIU/L-ACNC: 3.93 UIU/ML (ref 0.45–4.5)
VLDLC SERPL CALC-MCNC: 21 MG/DL (ref 5–40)
WBC # BLD AUTO: 6.5 X10E3/UL (ref 3.4–10.8)

## 2023-10-11 RX ORDER — EZETIMIBE 10 MG/1
10 TABLET ORAL DAILY
Qty: 90 TABLET | Refills: 3 | Status: SHIPPED | OUTPATIENT
Start: 2023-10-11

## 2023-10-11 RX ORDER — ATORVASTATIN CALCIUM 80 MG/1
TABLET, FILM COATED ORAL
Qty: 90 TABLET | Refills: 2 | Status: SHIPPED | OUTPATIENT
Start: 2023-10-11

## 2023-10-11 NOTE — PROGRESS NOTES
[facility] [place]  [facility]  [address] [place] [address]  Dept: [phone]  Dept Fax: [phone]    Kerrie Medrano  1945    New Patient Office Note    History of Present Illness:  Kerrie Medrano is a 77 y.o. male who presents to the clinic for New Patient, Abnormal EKG, preoperative cardiovascular clearance total knee replacement. He has PMH significant for HTN, HLP, CAD, GERD, DM, COPD. He denies smoking, Etoh,drugs. Family history includes Mother- by MI age 64. Father  by MI in 60's.     He presents today as referral from PCP for  pre operative left total knee clearance Dr. Red and abnormal EKG revealing RBBB atrial flutter. Today EKG is unchanged Atrial flutter with RVR, Hr 102, RBBB, compared to historical record atrial flutter is new finding and he denies known history. He does have complaints of feeling heart quiver today, also states he feels short of breath especially when he is active, otherwise he denies all cardiac complaints. He has known CAD, moderate by cath , PDA 50%, LAD 30-40%, 1st dx 50%, on ASA, review of historical echo normal Ef, mild MAC, RV dilation, LAE, mild TR. Known HTN, HLP, DM. BP today is elevated 142/86 on recheck, on Exforge , congruent with his PCP reading from yesterday. Review of labs , trig 267 on Lipitor 40. Exam today irregularly irregular rhythm, Hr 102 otherwise stable vitals, trace edema. At this time given his known history and abnormal rhythm today will proceed with echo, Lexiscan nuclear 2 day as he cannot walk given musculoskeletal complaint. Add Eliquis 5 bid, chdsvasc 5, also Toprol 25 bid. Increase Lipitor to 80, add zetia. Further recommendation on surgical clearance given test results and follow up exam.     Patient given verbal and written instructions per AVS, he cannot read, daughter lisandro called and made aware of changes, new medications and diagnosis and treatment.   Eliquis LOT  [id number] Exp June 2025, 1 month given while working out financials/patient assistance.     The following portions of the patient's history were reviewed and updated as appropriate: allergies, current medications, past family history, past medical history, past social history, past surgical history, and problem list.    Medications:  amLODIPine-valsartan  apixaban tablet  aspirin  atorvastatin  busPIRone  chlorhexidine  ezetimibe  metFORMIN ER  metoprolol tartrate  montelukast  pantoprazole  sertraline  traZODone  Trelegy Ellipta aerosol powder   Vitamin D3    Subjective  No Known Allergies     Past Medical History:   Diagnosis Date    Benign essential hypertension     Disorder of hair and hair follicles     Joint pain        Past Surgical History:   Procedure Laterality Date    APPENDECTOMY  1960    COLONOSCOPY  04/2009    COLON POLYP-20 CM INFLAMMATORY POLYP, PLAN: REPEAT IN 5 YEARS, NO FH OF COLON CANCER    KNEE SURGERY      ARTHROCENTESIS OF THE RIGHT KNEE MEDIAL JOINT LINE  AND LEFT KNEE    SKIN CANCER EXCISION         Family History   Problem Relation Age of Onset    Heart attack Mother     Heart attack Father     Skin cancer Brother 50    Heart attack Brother         X3        Social History     Socioeconomic History    Marital status:    Tobacco Use    Smoking status: Never     Passive exposure: Never    Smokeless tobacco: Never   Vaping Use    Vaping Use: Never used   Substance and Sexual Activity    Alcohol use: Never    Drug use: Never    Sexual activity: Defer       Review of Systems   Respiratory:  Positive for shortness of breath.    Cardiovascular:  Positive for palpitations.   All other systems reviewed and are negative.    Cardiovascular Procedures      CMP          7/3/2023    11:18 10/10/2023    14:39   CMP   Glucose 146  116    BUN 14  14    Creatinine 1.04  1.11    Sodium 139  140    Potassium 4.4  4.5    Chloride 104  102    Calcium 9.4  9.1    Total Protein 6.8  7.0   "  Albumin 4.1  4.2    Globulin 2.7  2.8    Total Bilirubin 0.7  1.0    Alkaline Phosphatase 89  88    AST (SGOT) 23  20    ALT (SGPT) 20  19    BUN/Creatinine Ratio 13  13      CBC w/diff          7/3/2023    11:18 10/10/2023    14:39   CBC w/Diff   WBC 5.9  6.5    RBC 5.35  5.50    Hemoglobin 15.9  16.2    Hematocrit 48.5  48.7    MCV 91  89    MCH 29.7  29.5    MCHC 32.8  33.3    RDW 13.5  13.2    Platelets 146  142    Neutrophil Rel % 55  57    Lymphocyte Rel % 29  28    Monocyte Rel % 8  9    Eosinophil Rel % 7  5    Basophil Rel % 1  1       Lipid Panel          7/3/2023    11:18 10/10/2023    14:39   Lipid Panel   Total Cholesterol 245  188    Triglycerides 267  114    HDL Cholesterol 37  49    VLDL Cholesterol 50  21    LDL Cholesterol  158  118       Lab Results   Component Value Date    TSH 3.930 10/10/2023    TSH 3.580 07/03/2023    TSH 2.900 05/03/2022      Lab Results   Component Value Date    FREET4 1.10 10/10/2023    FREET4 0.94 07/03/2023      Magnesium   Date Value Ref Range Status   10/10/2023 2.2 1.6 - 2.3 mg/dL Final      No results found for: \"TROPONINT\"   Hemoglobin A1C   Date Value Ref Range Status   10/10/2023 7.0 (H) 4.8 - 5.6 % Final     Comment:              Prediabetes: 5.7 - 6.4           Diabetes: >6.4           Glycemic control for adults with diabetes: <7.0              Objective  Vitals:    10/11/23 1339 10/11/23 1345   BP: 150/81    BP Location: Right arm    Patient Position: Sitting    Cuff Size: Large Adult    Pulse: 64 102   Resp: 18    SpO2: 99%    Weight: 132 kg (290 lb)    Height: 179.1 cm (70.51\")    PainSc:   2    PainLoc: Knee        Physical Exam  Vitals reviewed.   Constitutional:       General: Awake.      Appearance: Normal appearance. Overweight and not in distress.   Neck:      Vascular: No JVR. JVD normal.   Pulmonary:      Effort: Pulmonary effort is normal.      Breath sounds: Normal breath sounds. Wheezing present. No rhonchi. No rales.   Chest:      Chest wall: " Not tender to palpatation.   Cardiovascular:      PMI at left midclavicular line. Tachycardia present. Irregularly irregular rhythm. Normal S1. Normal S2.       Murmurs: There is no murmur.      No gallop.  No click. No rub.   Pulses:     Intact distal pulses.   Edema:     Pretibial: bilateral trace edema of the pretibial area.     Ankle: bilateral trace edema of the ankle.  Abdominal:      General: Bowel sounds are normal.      Palpations: Abdomen is soft.      Tenderness: There is no abdominal tenderness.   Musculoskeletal:         General: No tenderness. Skin:     General: Skin is warm and dry.   Neurological:      General: No focal deficit present.      Mental Status: Alert and oriented to person, place and time.   Psychiatric:         Behavior: Behavior is cooperative.        Diagnostic Data    ECG 12 Lead    Date/Time: 10/11/2023 4:31 PM  Performed by: Brooke Jernigan APRN    Authorized by: Brooke Jernigan APRN  Comparison: compared with previous ECG   Comparison to previous ECG: Now atrial flutter compared to historical  Rhythm: atrial flutter  Rate: tachycardic  BPM: 102  Conduction: right bundle branch block  QRS axis: right    Clinical impression: abnormal EKG      Advance Care Planning        Assessment and Plan  Diagnoses and all orders for this visit:    1. Pre-operative cardiovascular examination (Primary)  Total knee replacement right Dr. Red, will hold clearance pending test results, exam.     2. Atrial fibrillation with RVR  Today new finding by EKG, chadsvasc 5, known moderate CAD. Chadsvasc 5, will add Toprol 25 bid and Eliquis 5 bid. Advised to stop Celebrex.  Echo and Nuclear.   -     Adult Transthoracic Echo Complete W/ Cont if Necessary Per Protocol; Future  -     Stress Test With Myocardial Perfusion (2 Day); Future  -     apixaban (ELIQUIS) 5 MG tablet tablet; Take 1 tablet by mouth 2 (Two) Times a Day.  Dispense: 60 tablet; Refill: 0  -     proBNP  -     Troponin T  -      TSH Rfx On Abnormal To Free T4  -     CBC & Differential    3. Coronary artery disease of native artery of native heart with stable angina pectoris  Moderate disease by cath 2021, PDA 50%, LAD 40%, 1st Dx 50%, on ASA. + SOB today, no chest pain, uncontrolled HLP, DM. Lexiscan nuclear  -     Adult Transthoracic Echo Complete W/ Cont if Necessary Per Protocol; Future  -     Stress Test With Myocardial Perfusion (2 Day); Future  -     Troponin T    4. Mixed hyperlipidemia LDL goal < 70  Not controlled on Lipitor 40,  July 2023, will further increase Lipitor to 80, add Zetia.   -     atorvastatin (LIPITOR) 80 MG tablet; Take 1 tab po daily for cholesterol  Dispense: 90 tablet; Refill: 2    5. Primary hypertension  On Exforge amlodipine-valsartan , BP today elevated 142/86, adding metoprolol, may need further adjunct.     6. Type 2 diabetes mellitus without complication, without long-term current use of insulin    7. Dyspnea on exertion  Plan as above.   -     Adult Transthoracic Echo Complete W/ Cont if Necessary Per Protocol; Future  -     proBNP    Other orders  -     ezetimibe (ZETIA) 10 MG tablet; Take 1 tablet by mouth Daily.  Dispense: 90 tablet; Refill: 3  -     metoprolol tartrate (LOPRESSOR) 25 MG tablet; Take 1 tablet by mouth 2 (Two) Times a Day.  Dispense: 60 tablet; Refill: 2            Return in about 3 weeks (around 11/1/2023) for Recheck, Brooke EDWARDS.    JERRY Lang  10/11/2023    Part of this note may be an electronic transcription/translation of spoken language to printed text using the Dragon Dictation System.

## 2023-10-11 NOTE — PATIENT INSTRUCTIONS
Start on blood thinner Eliquis 5 mg twice daily (take at same time every day)    Start metoprolol 25 mg twice daily (take at same time as eliquis)    Increase Lipitor to 80 mg once daily    Add Zetia (Ezetimibe) 10 mg once daily morning or night    We will call you with date and time of Echo and stress testing

## 2023-10-12 ENCOUNTER — TELEPHONE (OUTPATIENT)
Dept: CARDIOLOGY | Facility: CLINIC | Age: 78
End: 2023-10-12
Payer: MEDICARE

## 2023-10-12 LAB
BASOPHILS # BLD AUTO: 0.1 X10E3/UL (ref 0–0.2)
BASOPHILS NFR BLD AUTO: 1 %
EOSINOPHIL # BLD AUTO: 0.4 X10E3/UL (ref 0–0.4)
EOSINOPHIL NFR BLD AUTO: 5 %
ERYTHROCYTE [DISTWIDTH] IN BLOOD BY AUTOMATED COUNT: 13.5 % (ref 11.6–15.4)
HCT VFR BLD AUTO: 49.9 % (ref 37.5–51)
HGB BLD-MCNC: 16.3 G/DL (ref 13–17.7)
IMM GRANULOCYTES # BLD AUTO: 0 X10E3/UL (ref 0–0.1)
IMM GRANULOCYTES NFR BLD AUTO: 0 %
LYMPHOCYTES # BLD AUTO: 1.9 X10E3/UL (ref 0.7–3.1)
LYMPHOCYTES NFR BLD AUTO: 27 %
MCH RBC QN AUTO: 29.4 PG (ref 26.6–33)
MCHC RBC AUTO-ENTMCNC: 32.7 G/DL (ref 31.5–35.7)
MCV RBC AUTO: 90 FL (ref 79–97)
MONOCYTES # BLD AUTO: 0.6 X10E3/UL (ref 0.1–0.9)
MONOCYTES NFR BLD AUTO: 8 %
NEUTROPHILS # BLD AUTO: 4.1 X10E3/UL (ref 1.4–7)
NEUTROPHILS NFR BLD AUTO: 59 %
NT-PROBNP SERPL-MCNC: 640 PG/ML (ref 0–486)
PLATELET # BLD AUTO: 145 X10E3/UL (ref 150–450)
RBC # BLD AUTO: 5.55 X10E6/UL (ref 4.14–5.8)
TROPONIN T SERPL HS-MCNC: 14 NG/L (ref 0–22)
TSH SERPL DL<=0.005 MIU/L-ACNC: 3.27 UIU/ML (ref 0.45–4.5)
WBC # BLD AUTO: 7.1 X10E3/UL (ref 3.4–10.8)

## 2023-10-12 NOTE — TELEPHONE ENCOUNTER
----- Message from JERRY Lang sent at 10/12/2023  3:47 PM EDT -----  Please let him known all labs look relatively normal except his platelets which have been chronically low, no major change and his proBNP was elevated but sometimes we can see this due to the abnormal heart rhythm. He will have testing soon and we will make sure this is not elevated due to other reasons but likely I may need to add additional medication to help treat if indicted.     How is he doing? Is he taking the Eliquis twice daily and the metoprolol twice daily (I added those yesterday)

## 2023-10-12 NOTE — TELEPHONE ENCOUNTER
Brooke Jernigan, JERRY Rush, [place], [place]  Please let him known all labs look relatively normal except his platelets which have been chronically low, no major change and his proBNP was elevated but sometimes we can see this due to the abnormal heart rhythm. He will have testing soon and we will make sure this is not elevated due to other reasons but likely I may need to add additional medication to help treat if indicted.    How is he doing? Is he taking the Eliquis twice daily and the metoprolol twice daily (I added those yesterday)    LEFT MESSAGE FOR PT .  PLEASE LET SPEAK TO PT TOMORROW WILL BE LEAVING SOON

## 2023-10-13 ENCOUNTER — TELEPHONE (OUTPATIENT)
Dept: CARDIOLOGY | Facility: CLINIC | Age: 78
End: 2023-10-13
Payer: MEDICARE

## 2023-10-13 NOTE — TELEPHONE ENCOUNTER
Brooke Jernigan, JERRY Rush, RICHIE Dsouza  Please let him known all labs look relatively normal except his platelets which have been chronically low, no major change and his proBNP was elevated but sometimes we can see this due to the abnormal heart rhythm. He will have testing soon and we will make sure this is not elevated due to other reasons but likely I may need to add additional medication to help treat if indicted.    How is he doing?         Is he taking the Eliquis twice daily and the metoprolol twice daily (I added those yesterday)            SPOKE WITH PT HE IS DOING GOOD AND SHE SAID NOT SURE ABOUT THE MEDICATION BUT,  WHEN SHE GETS BACK SHE WILL WORK ON THAT.

## 2023-10-23 ENCOUNTER — TRANSCRIBE ORDERS (OUTPATIENT)
Dept: CARDIOLOGY | Facility: CLINIC | Age: 78
End: 2023-10-23
Payer: MEDICARE

## 2023-10-23 DIAGNOSIS — I25.118 ATHSCL HEART DISEASE OF NATIVE COR ART W OTH ANG PCTRS: Primary | ICD-10-CM

## 2023-10-25 ENCOUNTER — TELEPHONE (OUTPATIENT)
Dept: CARDIOLOGY | Facility: CLINIC | Age: 78
End: 2023-10-25
Payer: MEDICARE

## 2023-10-25 NOTE — TELEPHONE ENCOUNTER
Patient left the office after the nuclear study, but before his echo. Daughter notified and patient will come back in the morning, 10/26/23,  at 11 for the echo.

## 2023-10-25 NOTE — TELEPHONE ENCOUNTER
can you call him and tell him to bring all of his medications in their original bottles when he comes tomorrow and that I need to talk to him before he leaves. See if we can put him on my schedule for 11:30 to see him regarding his atrial fibrillation and stress testing result .           Spoke with pt daughter . Pt will not be bring medication cause she can not get it for him . But she will be on phone call in room .  Pt can be seen at 1130 .

## 2023-10-26 ENCOUNTER — TRANSCRIBE ORDERS (OUTPATIENT)
Dept: CARDIOLOGY | Facility: CLINIC | Age: 78
End: 2023-10-26

## 2023-10-26 ENCOUNTER — OFFICE VISIT (OUTPATIENT)
Dept: CARDIOLOGY | Facility: CLINIC | Age: 78
End: 2023-10-26
Payer: MEDICARE

## 2023-10-26 VITALS
RESPIRATION RATE: 18 BRPM | WEIGHT: 284.8 LBS | HEART RATE: 123 BPM | OXYGEN SATURATION: 94 % | BODY MASS INDEX: 36.55 KG/M2 | HEIGHT: 74 IN | DIASTOLIC BLOOD PRESSURE: 70 MMHG | SYSTOLIC BLOOD PRESSURE: 110 MMHG

## 2023-10-26 DIAGNOSIS — E78.2 MIXED HYPERLIPIDEMIA: Chronic | ICD-10-CM

## 2023-10-26 DIAGNOSIS — I10 PRIMARY HYPERTENSION: Chronic | ICD-10-CM

## 2023-10-26 DIAGNOSIS — I50.21 ACUTE HFREF (HEART FAILURE WITH REDUCED EJECTION FRACTION): ICD-10-CM

## 2023-10-26 DIAGNOSIS — I48.0 PAROXYSMAL ATRIAL FIBRILLATION: Primary | ICD-10-CM

## 2023-10-26 DIAGNOSIS — I48.91 ATRIAL FIBRILLATION WITH RVR: Primary | ICD-10-CM

## 2023-10-26 DIAGNOSIS — I48.91 ATRIAL FIBRILLATION WITH RVR: ICD-10-CM

## 2023-10-26 DIAGNOSIS — E11.9 TYPE 2 DIABETES MELLITUS WITHOUT COMPLICATION, WITHOUT LONG-TERM CURRENT USE OF INSULIN: Chronic | ICD-10-CM

## 2023-10-26 DIAGNOSIS — I25.118 CORONARY ARTERY DISEASE OF NATIVE ARTERY OF NATIVE HEART WITH STABLE ANGINA PECTORIS: Chronic | ICD-10-CM

## 2023-10-26 RX ORDER — SPIRONOLACTONE 25 MG/1
25 TABLET ORAL DAILY
Qty: 30 TABLET | Refills: 2 | Status: SHIPPED | OUTPATIENT
Start: 2023-10-26

## 2023-10-26 RX ORDER — SACUBITRIL AND VALSARTAN 24; 26 MG/1; MG/1
1 TABLET, FILM COATED ORAL 2 TIMES DAILY
Qty: 60 TABLET | Refills: 1 | Status: SHIPPED | OUTPATIENT
Start: 2023-10-26

## 2023-10-26 NOTE — PROGRESS NOTES
[facility] CARDIOLOGY  [address] [place] [address]  Dept: [phone]  Dept Fax: [phone]    Kerrie Medrano  1945    Follow Up Office Visit Note    History of Present Illness:  Kerrie Medrano is a 78 y.o. male who presents to the clinic for Follow-up. HTN, exertional dyspnea, Afib RVR. Today he still has complaints of feeling short of breath, denies all other complaints at this time. Still in atrial fibrillation RVR, rate of 109 today, he states to have been taking Eliquis 5 bid, also metoprolol 25 bid but not 100% certainty as he cannot read and daughter has been helping manage his daily medications. I was able to speak with Zulma on the phone today regarding his condition, but will call back this afternoon to clarify all medication. He had recent Nuclear test which was abnormal, with low Ef 50% rest with decreased to 40%  moderate-sized, moderate-to-severe area of ischemia located in the apex. High risk study. He has known CAD by cath moderate disease 2021, PDA 50%, LAD 30-40%, 1st Dx 50%, he has not been compliant with medical management since then, known DM with HLP. At this time we will focus on managing the afib, will initiate sotalol 80 bid and shared decision making with patient regarding treatment, he is agreeable to proceed with SONIA/ECV at [place], he understands risks and is agreeable to proceed. Will also initiate medications for heart failure, elevated , will stop exforge, start Entresto 24/26 bid, also aldactone 25 daily. Continue all other medications. Will likely also need cath following cardioversion given the above finding. Will follow closely in 2 weeks.     The following portions of the patient's history were reviewed and updated as appropriate: allergies, current medications, past family history, past medical history, past social history, past surgical history, and problem list.    Medications:  apixaban  tablet  aspirin  atorvastatin  busPIRone  chlorhexidine  Entresto tablet  ezetimibe  metFORMIN ER  metoprolol tartrate  montelukast  pantoprazole  sertraline  Sotalol HCl AF tablet  spironolactone  traZODone  Trelegy Ellipta aerosol powder   Vitamin D3    Subjective  No Known Allergies     Past Medical History:   Diagnosis Date   • Benign essential hypertension    • Disorder of hair and hair follicles    • Joint pain        Past Surgical History:   Procedure Laterality Date   • APPENDECTOMY  1960   • COLONOSCOPY  04/2009    COLON POLYP-20 CM INFLAMMATORY POLYP, PLAN: REPEAT IN 5 YEARS, NO FH OF COLON CANCER   • KNEE SURGERY      ARTHROCENTESIS OF THE RIGHT KNEE MEDIAL JOINT LINE  AND LEFT KNEE   • SKIN CANCER EXCISION         Family History   Problem Relation Age of Onset   • Heart attack Mother    • Heart attack Father    • Skin cancer Brother 50   • Heart attack Brother         X3        Social History     Socioeconomic History   • Marital status:    Tobacco Use   • Smoking status: Never     Passive exposure: Never   • Smokeless tobacco: Never   Vaping Use   • Vaping Use: Never used   Substance and Sexual Activity   • Alcohol use: Never   • Drug use: Never   • Sexual activity: Defer       Review of Systems   Constitutional:  Positive for fatigue.   Respiratory:  Positive for shortness of breath.    All other systems reviewed and are negative.    Cardiovascular Procedures    ECHO/MUGA:   STRESS TESTS:   CARDIAC CATH:   DEVICES:   HOLTER:   CT/MRI:   VASCULAR:   CARDIOTHORACIC:   proBNP   Date Value Ref Range Status   10/11/2023 640 (H) 0 - 486 pg/mL Final     Comment:     The following cut-points have been suggested for the  use of proBNP for the diagnostic evaluation of heart  failure (HF) in patients with acute dyspnea:  Modality                     Age           Optimal Cut                             (years)            Point  ------------------------------------------------------  Diagnosis (rule in HF)         <50            450 pg/mL                            50 - 75            900 pg/mL                                >75           1800 pg/mL  Exclusion (rule out HF)  Age independent     300 pg/mL       CMP          7/3/2023    11:18 10/10/2023    14:39   CMP   Glucose 146  116    BUN 14  14    Creatinine 1.04  1.11    Sodium 139  140    Potassium 4.4  4.5    Chloride 104  102    Calcium 9.4  9.1    Total Protein 6.8  7.0    Albumin 4.1  4.2    Globulin 2.7  2.8    Total Bilirubin 0.7  1.0    Alkaline Phosphatase 89  88    AST (SGOT) 23  20    ALT (SGPT) 20  19    BUN/Creatinine Ratio 13  13      CBC w/diff          7/3/2023    11:18 10/10/2023    14:39 10/11/2023    14:47   CBC w/Diff   WBC 5.9  6.5  7.1    RBC 5.35  5.50  5.55    Hemoglobin 15.9  16.2  16.3    Hematocrit 48.5  48.7  49.9    MCV 91  89  90    MCH 29.7  29.5  29.4    MCHC 32.8  33.3  32.7    RDW 13.5  13.2  13.5    Platelets 146  142  145    Neutrophil Rel % 55  57  59    Lymphocyte Rel % 29  28  27    Monocyte Rel % 8  9  8    Eosinophil Rel % 7  5  5    Basophil Rel % 1  1  1       Lipid Panel          7/3/2023    11:18 10/10/2023    14:39   Lipid Panel   Total Cholesterol 245  188    Triglycerides 267  114    HDL Cholesterol 37  49    VLDL Cholesterol 50  21    LDL Cholesterol  158  118       Lab Results   Component Value Date    TSH 3.270 10/11/2023    TSH 3.930 10/10/2023    TSH 3.580 07/03/2023      Lab Results   Component Value Date    FREET4 1.10 10/10/2023    FREET4 0.94 07/03/2023      Magnesium   Date Value Ref Range Status   10/10/2023 2.2 1.6 - 2.3 mg/dL Final      Troponin T   Date Value Ref Range Status   10/11/2023 14 0 - 22 ng/L Final     Comment:     In order to distinguish acute elevations of high sensitive  Troponin from other clinical conditions, the Universal  Definition of myocardial infarction stresses clinical  assessment and the need for serial measurements to observe  a rise and/or fall above the upper limit of the  "reference  interval.        Hemoglobin A1C   Date Value Ref Range Status   10/10/2023 7.0 (H) 4.8 - 5.6 % Final     Comment:              Prediabetes: 5.7 - 6.4           Diabetes: >6.4           Glycemic control for adults with diabetes: <7.0          Last Stress  Results for orders placed in visit on 10/25/23    Stress Test With Myocardial Perfusion One Day    Interpretation Summary  •  Left ventricular ejection fraction is borderline normal (Calculated EF = 50%). and dropps to 40% at stress  •  Myocardial perfusion imaging indicates a moderate-sized, moderate-to-severe area of ischemia located in the apex.  •  Impressions are consistent with a high risk study.  •  Findings consistent with an indeterminate ECG stress test.       Last Cardiac Monitor      Last Cath  No results found for this or any previous visit.        Objective  Vitals:    10/26/23 1113 10/26/23 1200   BP: (!) 88/62 110/70   BP Location: Right arm    Patient Position: Sitting    Cuff Size: Large Adult    Pulse: (!) 123    Resp: 18    SpO2: 94%    Weight: 129 kg (284 lb 12.8 oz)    Height: 188 cm (74\")    PainSc: 0-No pain      Body mass index is 36.57 kg/m².     Physical Exam  Vitals reviewed.   Constitutional:       General: Awake.      Appearance: Overweight and not in distress. Frail.   Neck:      Vascular: No JVR. JVD normal.   Pulmonary:      Effort: Pulmonary effort is normal.      Breath sounds: Normal breath sounds. No wheezing. No rhonchi. No rales.   Chest:      Chest wall: Not tender to palpatation.   Cardiovascular:      PMI at left midclavicular line. Tachycardia present. Irregularly irregular rhythm. Normal S1. Normal S2.       Murmurs: There is no murmur.      No gallop.  No click. No rub.   Pulses:     Intact distal pulses.   Edema:     Pretibial: bilateral trace edema of the pretibial area.     Ankle: bilateral trace edema of the ankle.  Abdominal:      General: Bowel sounds are normal.      Palpations: Abdomen is soft.      " Tenderness: There is no abdominal tenderness.   Musculoskeletal:         General: No tenderness. Skin:     General: Skin is warm and dry.   Neurological:      General: No focal deficit present.      Mental Status: Alert and oriented to person, place and time.   Psychiatric:         Behavior: Behavior is cooperative.        Diagnostic Data    ECG 12 Lead    Date/Time: 10/26/2023 3:00 PM  Performed by: Brooke Jernigan APRN    Authorized by: Brooke Jernigan APRN  Comparison: compared with previous ECG from 10/11/2023  Similar to previous ECG  Rhythm: atrial fibrillation  Rate: normal  BPM: 109  Conduction: right bundle branch block  QRS axis: right    Clinical impression: abnormal EKG      Advance Care Planning          Assessment and Plan  Diagnoses and all orders for this visit:    1. Atrial fibrillation with RVR (Primary)  Today continued RVR, rate 109, he states to be on Eliquis 5 bid, metoprolol 25 bid but states to have not taken today. Will start Sotalol 80 bid, schedule SONIA/ECV at [facility] as there is question regarding his medication compliance.   -     Sotalol HCl AF 80 MG tablet; Take 1 tablet by mouth 2 (Two) Times a Day.  Dispense: 60 tablet; Refill: 1  -     Cardioversion External in Cardiology Department; Future    2. Acute HFrEF (heart failure with reduced ejection fraction)  As above by Nuclear reduction to 40% with stress, echo result is pending. On metoprolol 25 bid, will stop exforge and start Entresto 24/26 bid, aldactone 25 qd. Consider cath following cardioversion.   -     spironolactone (ALDACTONE) 25 MG tablet; Take 1 tablet by mouth Daily.  Dispense: 30 tablet; Refill: 2  -     sacubitril-valsartan (Entresto) 24-26 MG tablet; Take 1 tablet by mouth 2 (Two) Times a Day.  Dispense: 60 tablet; Refill: 1    3. Coronary artery disease of native artery of native heart with stable angina pectoris  Moderate disease by cath 2021, PDA 50%, LAD 40%, 1st Dx 50%, on ASA. + SOB today, no chest  pain, uncontrolled HLP, DM. Lexiscan nuclear was abnormal showing above reversible defect [place], will consider cath following cardioversion. For now medical management, ASA, statin.     4. Mixed hyperlipidemia  Not at goal on Lipitor 80 increased last visit and Zetia newly added. , trig 267. Will repeat Lipid 3 months. Continue plan.     5. Primary hypertension  BP had been elevated last visit, today is 110/70 on metoprolol 25 bid, exforge . Stop exforge, starting Entresto, aldactone as above.     6. Type 2 diabetes mellitus without complication, without long-term current use of insulin         Return in about 2 years (around 10/26/2025) for Recheck, Brooke EDWARDS.    JERRY Lang  10/26/2023    Part of this note may be an electronic transcription/translation of spoken language to printed text using the Dragon Dictation System.

## 2023-10-27 ENCOUNTER — TELEPHONE (OUTPATIENT)
Dept: CARDIOLOGY | Facility: CLINIC | Age: 78
End: 2023-10-27
Payer: MEDICARE

## 2023-10-27 NOTE — TELEPHONE ENCOUNTER
----- Message from Kaylie Grider RN sent at 10/26/2023  4:34 PM EDT -----  See if kenton has an answer on SONIA and Cardioversion

## 2023-10-30 ENCOUNTER — TELEPHONE (OUTPATIENT)
Dept: CARDIOLOGY | Facility: CLINIC | Age: 78
End: 2023-10-30
Payer: MEDICARE

## 2023-10-30 NOTE — TELEPHONE ENCOUNTER
Patient's care giver looked at Eliquis online and has a coup and she may come in for one as well if it does not work.

## 2023-10-30 NOTE — TELEPHONE ENCOUNTER
Spoke with Zulma, daughter, and she confirmed that Wednesday the 8th arrive at 0630 at front registration for the [employer] with Cardioversion would work. Gave instructions that NPO after MN only take medications with a sip of water.  Needs a .  She verbalized understanding.  He is going to f/u with Brooke on Friday, 11/10 at 2PM.

## 2023-10-30 NOTE — TELEPHONE ENCOUNTER
Caller: [employer]    Relationship:     Best call back number: [phone]    What is the best time to reach you: ANY    What was the call regarding: PATIENT HAS MISPLACED/LOST THE ELIQUIS COUPON AND WANTS TO KNOW IF HE CAN OBTAIN ANOTHER. PLEASE CALL ASAP    Is it okay if the provider responds through MyChart: NO

## 2023-11-02 ENCOUNTER — TELEPHONE (OUTPATIENT)
Dept: CARDIOLOGY | Facility: CLINIC | Age: 78
End: 2023-11-02
Payer: MEDICARE

## 2023-11-02 NOTE — TELEPHONE ENCOUNTER
Spoke with Zulma that [employer] is needing his SS statement faxed to make a decision on assistance.

## 2023-11-03 ENCOUNTER — CLINICAL SUPPORT (OUTPATIENT)
Dept: CARDIOLOGY | Facility: CLINIC | Age: 78
End: 2023-11-03
Payer: MEDICARE

## 2023-11-03 DIAGNOSIS — I48.91 ATRIAL FIBRILLATION WITH RVR: Primary | ICD-10-CM

## 2023-11-03 NOTE — PROGRESS NOTES
ECG 12 Lead    Date/Time: 11/3/2023 3:04 PM  Performed by: Brooke Jernigan APRN    Authorized by: Brooke Jernigan APRN  Comparison: compared with previous ECG from 10/26/2023  Similar to previous ECG  Rhythm: atrial flutter  Rate: normal  BPM: 70  Conduction: right bundle branch block  QRS axis: right  Other findings: non-specific ST-T wave changes    Clinical impression: abnormal EKG  Comments: Mild prolongation Qtc 488 today

## 2023-11-08 ENCOUNTER — TELEPHONE (OUTPATIENT)
Dept: CARDIOLOGY | Facility: CLINIC | Age: 78
End: 2023-11-08

## 2023-11-08 ENCOUNTER — OUTSIDE FACILITY SERVICE (OUTPATIENT)
Dept: CARDIOLOGY | Facility: CLINIC | Age: 78
End: 2023-11-08
Payer: MEDICARE

## 2023-11-08 NOTE — TELEPHONE ENCOUNTER
Spoke with Zulma,daughter, and advised her that per Dr. Franklin he would like her father to stop the Metoprolol and Trazadone due to a long QT interval.  She verbalized understanding.  I explained that we need to move Fridays office visit with Brooke until the following week to give time for the QT interval to normalize before we repeat an EKG.  He is scheduled for 11/15 at 2PM.  She verbalized understanding.      Brooke updated on Mr. Medrano's cardioversion and advised he went back to normal SR but the QT interval >500 and Dr Franklin made some changes to the Metoprolol and Trazadone.

## 2023-11-15 ENCOUNTER — OFFICE VISIT (OUTPATIENT)
Dept: CARDIOLOGY | Facility: CLINIC | Age: 78
End: 2023-11-15
Payer: MEDICARE

## 2023-11-15 VITALS
RESPIRATION RATE: 18 BRPM | BODY MASS INDEX: 37.22 KG/M2 | WEIGHT: 290 LBS | OXYGEN SATURATION: 90 % | SYSTOLIC BLOOD PRESSURE: 144 MMHG | HEART RATE: 87 BPM | HEIGHT: 74 IN | DIASTOLIC BLOOD PRESSURE: 74 MMHG | TEMPERATURE: 96 F

## 2023-11-15 DIAGNOSIS — I10 PRIMARY HYPERTENSION: Chronic | ICD-10-CM

## 2023-11-15 DIAGNOSIS — I25.118 CORONARY ARTERY DISEASE OF NATIVE ARTERY OF NATIVE HEART WITH STABLE ANGINA PECTORIS: Chronic | ICD-10-CM

## 2023-11-15 DIAGNOSIS — E11.9 TYPE 2 DIABETES MELLITUS WITHOUT COMPLICATION, WITHOUT LONG-TERM CURRENT USE OF INSULIN: Chronic | ICD-10-CM

## 2023-11-15 DIAGNOSIS — Z01.810 PRE-OPERATIVE CARDIOVASCULAR EXAMINATION: ICD-10-CM

## 2023-11-15 DIAGNOSIS — I48.91 ATRIAL FIBRILLATION WITH RVR: Primary | ICD-10-CM

## 2023-11-15 DIAGNOSIS — E78.2 MIXED HYPERLIPIDEMIA: Chronic | ICD-10-CM

## 2023-11-15 DIAGNOSIS — I45.10 RBBB: ICD-10-CM

## 2023-11-15 DIAGNOSIS — I50.21 ACUTE HFREF (HEART FAILURE WITH REDUCED EJECTION FRACTION): ICD-10-CM

## 2023-11-15 DIAGNOSIS — R06.09 DYSPNEA ON EXERTION: ICD-10-CM

## 2023-11-15 PROBLEM — R73.9 HYPERGLYCEMIA: Chronic | Status: RESOLVED | Noted: 2023-07-03 | Resolved: 2023-11-15

## 2023-11-15 RX ORDER — SACUBITRIL AND VALSARTAN 49; 51 MG/1; MG/1
1 TABLET, FILM COATED ORAL 2 TIMES DAILY
Qty: 180 TABLET | Refills: 2 | Status: SHIPPED | OUTPATIENT
Start: 2023-11-15

## 2023-11-15 NOTE — H&P (VIEW-ONLY)
[facility] [place]  [facility] CARDIOLOGY  [address] [place] [address]  Dept: [phone]  Dept Fax: [phone]    Kerrie Medrano  1945    Follow Up Office Visit Note    History of Present Illness:  Kerrie Medrano is a 78 y.o. male who presents to the clinic for Follow up PAF, shortness of breath, HF. PAF with RVR, He is s/p successful SONIA/ECV at [facility] on 11/8/23, no clot in [place], per SONIA Ef at rest 50 % with mild EDWARD, mild RV enlargement, trivial AI, mild plaques in ascending aorta. With nuclear Ef dropped to 40% under stress, moderate size, moderate severity area of ischemia in [place]. He had cath 2021 PDA 50%, LAD 30-40%, 1st Dx 50% at that time. He has not been taking medications, noncompliance and DM. Today he denies significant improvement in his complaint of shortness of breath, also intermittent dizziness. Given his continued complaints despite conversion to SR, there is concern for worsening of his known CAD. Shared decision making utilized. Discussed invasive cath with patient including indication, benefit and risk including risk of death, stroke, heart attack, bleeding, bruising, infection, hematoma, perforation. The patient understands the need and risk of the procedure and is agreeable to proceed.  Also discussed with his daughter lulu by phone, advised he will need to contact insurance company to discuss financial obligation. Will further increase Entresto to 49/51, BP is 140/80, otherwise continue current therapy.     The following portions of the patient's history were reviewed and updated as appropriate: allergies, current medications, past family history, past medical history, past social history, past surgical history, and problem list.    Medications:  apixaban tablet  aspirin  atorvastatin  busPIRone  chlorhexidine  Entresto tablet  ezetimibe  metFORMIN ER  metoprolol tartrate  montelukast  pantoprazole  sertraline  Sotalol HCl AF tablet  spironolactone  Trelegy  Ellipta aerosol powder   Vitamin D3    Subjective  No Known Allergies     Past Medical History:   Diagnosis Date   • Benign essential hypertension    • Disorder of hair and hair follicles    • Joint pain        Past Surgical History:   Procedure Laterality Date   • APPENDECTOMY  1960   • COLONOSCOPY  04/2009    COLON POLYP-20 CM INFLAMMATORY POLYP, PLAN: REPEAT IN 5 YEARS, NO FH OF COLON CANCER   • KNEE SURGERY      ARTHROCENTESIS OF THE RIGHT KNEE MEDIAL JOINT LINE  AND LEFT KNEE   • SKIN CANCER EXCISION         Family History   Problem Relation Age of Onset   • Heart attack Mother    • Heart attack Father    • Skin cancer Brother 50   • Heart attack Brother         X3        Social History     Socioeconomic History   • Marital status:    Tobacco Use   • Smoking status: Never     Passive exposure: Never   • Smokeless tobacco: Never   Vaping Use   • Vaping Use: Never used   Substance and Sexual Activity   • Alcohol use: Never   • Drug use: Never   • Sexual activity: Defer       Review of Systems   Respiratory:  Positive for shortness of breath.    Neurological:  Positive for light-headedness.   All other systems reviewed and are negative.      Cardiovascular Procedures      proBNP   Date Value Ref Range Status   10/11/2023 640 (H) 0 - 486 pg/mL Final     Comment:     The following cut-points have been suggested for the  use of proBNP for the diagnostic evaluation of heart  failure (HF) in patients with acute dyspnea:  Modality                     Age           Optimal Cut                             (years)            Point  ------------------------------------------------------  Diagnosis (rule in HF)        <50            450 pg/mL                            50 - 75            900 pg/mL                                >75           1800 pg/mL  Exclusion (rule out HF)  Age independent     300 pg/mL       CMP          7/3/2023    11:18 10/10/2023    14:39   CMP   Glucose 146  116    BUN 14  14    Creatinine  "1.04  1.11    Sodium 139  140    Potassium 4.4  4.5    Chloride 104  102    Calcium 9.4  9.1    Total Protein 6.8  7.0    Albumin 4.1  4.2    Globulin 2.7  2.8    Total Bilirubin 0.7  1.0    Alkaline Phosphatase 89  88    AST (SGOT) 23  20    ALT (SGPT) 20  19    BUN/Creatinine Ratio 13  13      CBC w/diff          7/3/2023    11:18 10/10/2023    14:39 10/11/2023    14:47   CBC w/Diff   WBC 5.9  6.5  7.1    RBC 5.35  5.50  5.55    Hemoglobin 15.9  16.2  16.3    Hematocrit 48.5  48.7  49.9    MCV 91  89  90    MCH 29.7  29.5  29.4    MCHC 32.8  33.3  32.7    RDW 13.5  13.2  13.5    Platelets 146  142  145    Neutrophil Rel % 55  57  59    Lymphocyte Rel % 29  28  27    Monocyte Rel % 8  9  8    Eosinophil Rel % 7  5  5    Basophil Rel % 1  1  1       Lipid Panel          7/3/2023    11:18 10/10/2023    14:39   Lipid Panel   Total Cholesterol 245  188    Triglycerides 267  114    HDL Cholesterol 37  49    VLDL Cholesterol 50  21    LDL Cholesterol  158  118       Lab Results   Component Value Date    TSH 3.270 10/11/2023    TSH 3.930 10/10/2023    TSH 3.580 07/03/2023      Lab Results   Component Value Date    FREET4 1.10 10/10/2023    FREET4 0.94 07/03/2023      Magnesium   Date Value Ref Range Status   10/10/2023 2.2 1.6 - 2.3 mg/dL Final      Troponin T   Date Value Ref Range Status   10/11/2023 14 0 - 22 ng/L Final     Comment:     In order to distinguish acute elevations of high sensitive  Troponin from other clinical conditions, the Universal  Definition of myocardial infarction stresses clinical  assessment and the need for serial measurements to observe  a rise and/or fall above the upper limit of the reference  interval.        Hemoglobin A1C   Date Value Ref Range Status   10/10/2023 7.0 (H) 4.8 - 5.6 % Final     Comment:              Prediabetes: 5.7 - 6.4           Diabetes: >6.4           Glycemic control for adults with diabetes: <7.0     No results found for: \"INR\", \"PROTIME\"      Last Echo  Results for " "orders placed in visit on 10/25/23    Adult Transthoracic Echo Complete W/ Cont if Necessary Per Protocol    Interpretation Summary  •  Left ventricular diastolic function was not assessed.  •  The left atrial cavity is moderately dilated.  •  The right atrial cavity is dilated.  •  Estimated right ventricular systolic pressure from tricuspid regurgitation is normal (<35 mmHg).  •  The aortic root measures 3.1 cm.  •  This is  very Limited examination poor windows  •  Grossly the LV might be normal  •  Both atria seems likely enalrged  •  Rv could not be seens well  •  Valve could not be evaluated  •  No pericardial effusion       Last Stress  Results for orders placed in visit on 10/25/23    Stress Test With Myocardial Perfusion One Day    Interpretation Summary  •  Left ventricular ejection fraction is borderline normal (Calculated EF = 50%). and dropps to 40% at stress  •  Myocardial perfusion imaging indicates a moderate-sized, moderate-to-severe area of ischemia located in the apex.  •  Impressions are consistent with a high risk study.  •  Findings consistent with an indeterminate ECG stress test.       Last Cardiac Monitor      Last Cath  No results found for this or any previous visit.        Objective  Vitals:    11/15/23 1328   BP: 144/74   BP Location: Right arm   Patient Position: Sitting   Cuff Size: Adult   Pulse: 87   Resp: 18   Temp: 96 °F (35.6 °C)   TempSrc: Infrared   SpO2: 90%   Weight: 132 kg (290 lb)   Height: 188 cm (74\")   PainSc: 0-No pain     Body mass index is 37.23 kg/m².     Physical Exam  Vitals reviewed.   Constitutional:       General: Awake.      Appearance: Normal and healthy appearance. Not in distress.   Neck:      Vascular: No JVR. JVD normal.   Pulmonary:      Effort: Pulmonary effort is normal.      Breath sounds: Normal breath sounds. No wheezing. No rhonchi. No rales.   Chest:      Chest wall: Not tender to palpatation.   Cardiovascular:      PMI at left midclavicular line. " Bradycardia present. Regular rhythm. Normal S1. Normal S2.       Murmurs: There is no murmur.      No gallop.  No click. No rub.   Pulses:     Intact distal pulses.   Edema:     Thigh: bilateral trace edema of the thigh.     Pretibial: bilateral trace edema of the pretibial area.     Ankle: bilateral trace edema of the ankle.     Feet: bilateral trace edema of the feet.  Abdominal:      General: Bowel sounds are normal.      Palpations: Abdomen is soft.      Tenderness: There is no abdominal tenderness.   Musculoskeletal: Normal range of motion.         General: No tenderness. Skin:     General: Skin is warm and dry.   Neurological:      General: No focal deficit present.      Mental Status: Alert and oriented to person, place and time.   Psychiatric:         Behavior: Behavior is cooperative.        Diagnostic Data    ECG 12 Lead    Date/Time: 11/15/2023 3:08 PM  Performed by: Brooke Jerngian APRN    Authorized by: Brooke Jernigan APRN  Comparison: compared with previous ECG from 11/3/2023  Comparison to previous ECG: Atrial fibrillation now absent  Rhythm: sinus bradycardia  Rate: bradycardic  BPM: 51  Conduction: right bundle branch block and left posterior fascicular block  QRS axis: right  Other findings: non-specific ST-T wave changes    Clinical impression: abnormal EKG        Advance Care Planning          Assessment and Plan  Diagnoses and all orders for this visit:    1. Atrial fibrillation with RVR (Primary)  S/P successful SONIA/ECV 11/8/23, SB today RBBB, Hr 51, still mild prolongation QT interval, he stopped metoprolol and trazodone. On Eliquis 5 bid and Sotalol 80 bid, denies bleeding. Continue plan.   -     Case Request Cath Lab: Cardiac Catheterization/Vascular Study    2. Acute HFrEF (heart failure with reduced ejection fraction)  Recent Ef by SONIA 50%, reduced during stress portion of nuclear to 40%, on Entresto 24/26, Aldactone, continue plan.   -     Case Request Cath Lab: Cardiac  Catheterization/Vascular Study  -     sacubitril-valsartan (Entresto) 49-51 MG tablet; Take 1 tablet by mouth 2 (Two) Times a Day.  Dispense: 180 tablet; Refill: 2  -     Basic Metabolic Panel    3. Coronary artery disease of native artery of native heart with stable angina pectoris  By cath 2021, PDA 50%, LAD 30-40%, 1st Dx 50%, he has not been taking medications, cholesterol and DM not controlled. Abnormal Nuclear revealing moderate size, moderately severe area of ischemia in [place]. Will schedule for cath, jose Lira.   -     Case Request Cath Lab: Cardiac Catheterization/Vascular Study    4. Dyspnea on exertion  No major improvement despite conversion to SR. Known SALIMA, asthma, sees Dr. Valdivia pulmonology, Also known CAD, HF with elevated proBNP and no improvement with diuretic use. Will send for cath.   -     Case Request Cath Lab: Cardiac Catheterization/Vascular Study    5. Mixed hyperlipidemia  On Lipitor 80 recently increased and Zetia, , trig 267 October 2023, will recheck January 2023.     6. Primary hypertension  On Entresto 24/26, Aldactone 25, BP today is elevated will further increase to 49/51 bid, labs today.     7. Pre-operative cardiovascular examination  No clearance at this time. Recommendation pending cath.     8. RBBB    9. Type 2 diabetes mellitus without complication, without long-term current use of insulin         Return in about 6 weeks (around 12/27/2023) for Recheck, Brooke EDWADRS.    JERRY Lang  11/15/2023    Part of this note may be an electronic transcription/translation of spoken language to printed text using the Dragon Dictation System.

## 2023-11-15 NOTE — PROGRESS NOTES
[facility] [place]  [facility] CARDIOLOGY  [address] [place] [address]  Dept: [phone]  Dept Fax: [phone]    Kerrie Medrano  1945    Follow Up Office Visit Note    History of Present Illness:  Kerrie Medrano is a 78 y.o. male who presents to the clinic for Follow up PAF, shortness of breath, HF. PAF with RVR, He is s/p successful SONIA/ECV at [facility] on 11/8/23, no clot in [place], per SONIA Ef at rest 50 % with mild EDWARD, mild RV enlargement, trivial AI, mild plaques in ascending aorta. With nuclear Ef dropped to 40% under stress, moderate size, moderate severity area of ischemia in [place]. He had cath 2021 PDA 50%, LAD 30-40%, 1st Dx 50% at that time. He has not been taking medications, noncompliance and DM. Today he denies significant improvement in his complaint of shortness of breath, also intermittent dizziness. Given his continued complaints despite conversion to SR, there is concern for worsening of his known CAD. Shared decision making utilized. Discussed invasive cath with patient including indication, benefit and risk including risk of death, stroke, heart attack, bleeding, bruising, infection, hematoma, perforation. The patient understands the need and risk of the procedure and is agreeable to proceed.  Also discussed with his daughter lulu by phone, advised he will need to contact insurance company to discuss financial obligation. Will further increase Entresto to 49/51, BP is 140/80, otherwise continue current therapy.     The following portions of the patient's history were reviewed and updated as appropriate: allergies, current medications, past family history, past medical history, past social history, past surgical history, and problem list.    Medications:  apixaban tablet  aspirin  atorvastatin  busPIRone  chlorhexidine  Entresto tablet  ezetimibe  metFORMIN ER  metoprolol tartrate  montelukast  pantoprazole  sertraline  Sotalol HCl AF tablet  spironolactone  Trelegy  Ellipta aerosol powder   Vitamin D3    Subjective  No Known Allergies     Past Medical History:   Diagnosis Date   • Benign essential hypertension    • Disorder of hair and hair follicles    • Joint pain        Past Surgical History:   Procedure Laterality Date   • APPENDECTOMY  1960   • COLONOSCOPY  04/2009    COLON POLYP-20 CM INFLAMMATORY POLYP, PLAN: REPEAT IN 5 YEARS, NO FH OF COLON CANCER   • KNEE SURGERY      ARTHROCENTESIS OF THE RIGHT KNEE MEDIAL JOINT LINE  AND LEFT KNEE   • SKIN CANCER EXCISION         Family History   Problem Relation Age of Onset   • Heart attack Mother    • Heart attack Father    • Skin cancer Brother 50   • Heart attack Brother         X3        Social History     Socioeconomic History   • Marital status:    Tobacco Use   • Smoking status: Never     Passive exposure: Never   • Smokeless tobacco: Never   Vaping Use   • Vaping Use: Never used   Substance and Sexual Activity   • Alcohol use: Never   • Drug use: Never   • Sexual activity: Defer       Review of Systems   Respiratory:  Positive for shortness of breath.    Neurological:  Positive for light-headedness.   All other systems reviewed and are negative.      Cardiovascular Procedures      proBNP   Date Value Ref Range Status   10/11/2023 640 (H) 0 - 486 pg/mL Final     Comment:     The following cut-points have been suggested for the  use of proBNP for the diagnostic evaluation of heart  failure (HF) in patients with acute dyspnea:  Modality                     Age           Optimal Cut                             (years)            Point  ------------------------------------------------------  Diagnosis (rule in HF)        <50            450 pg/mL                            50 - 75            900 pg/mL                                >75           1800 pg/mL  Exclusion (rule out HF)  Age independent     300 pg/mL       CMP          7/3/2023    11:18 10/10/2023    14:39   CMP   Glucose 146  116    BUN 14  14    Creatinine  "1.04  1.11    Sodium 139  140    Potassium 4.4  4.5    Chloride 104  102    Calcium 9.4  9.1    Total Protein 6.8  7.0    Albumin 4.1  4.2    Globulin 2.7  2.8    Total Bilirubin 0.7  1.0    Alkaline Phosphatase 89  88    AST (SGOT) 23  20    ALT (SGPT) 20  19    BUN/Creatinine Ratio 13  13      CBC w/diff          7/3/2023    11:18 10/10/2023    14:39 10/11/2023    14:47   CBC w/Diff   WBC 5.9  6.5  7.1    RBC 5.35  5.50  5.55    Hemoglobin 15.9  16.2  16.3    Hematocrit 48.5  48.7  49.9    MCV 91  89  90    MCH 29.7  29.5  29.4    MCHC 32.8  33.3  32.7    RDW 13.5  13.2  13.5    Platelets 146  142  145    Neutrophil Rel % 55  57  59    Lymphocyte Rel % 29  28  27    Monocyte Rel % 8  9  8    Eosinophil Rel % 7  5  5    Basophil Rel % 1  1  1       Lipid Panel          7/3/2023    11:18 10/10/2023    14:39   Lipid Panel   Total Cholesterol 245  188    Triglycerides 267  114    HDL Cholesterol 37  49    VLDL Cholesterol 50  21    LDL Cholesterol  158  118       Lab Results   Component Value Date    TSH 3.270 10/11/2023    TSH 3.930 10/10/2023    TSH 3.580 07/03/2023      Lab Results   Component Value Date    FREET4 1.10 10/10/2023    FREET4 0.94 07/03/2023      Magnesium   Date Value Ref Range Status   10/10/2023 2.2 1.6 - 2.3 mg/dL Final      Troponin T   Date Value Ref Range Status   10/11/2023 14 0 - 22 ng/L Final     Comment:     In order to distinguish acute elevations of high sensitive  Troponin from other clinical conditions, the Universal  Definition of myocardial infarction stresses clinical  assessment and the need for serial measurements to observe  a rise and/or fall above the upper limit of the reference  interval.        Hemoglobin A1C   Date Value Ref Range Status   10/10/2023 7.0 (H) 4.8 - 5.6 % Final     Comment:              Prediabetes: 5.7 - 6.4           Diabetes: >6.4           Glycemic control for adults with diabetes: <7.0     No results found for: \"INR\", \"PROTIME\"      Last Echo  Results for " "orders placed in visit on 10/25/23    Adult Transthoracic Echo Complete W/ Cont if Necessary Per Protocol    Interpretation Summary  •  Left ventricular diastolic function was not assessed.  •  The left atrial cavity is moderately dilated.  •  The right atrial cavity is dilated.  •  Estimated right ventricular systolic pressure from tricuspid regurgitation is normal (<35 mmHg).  •  The aortic root measures 3.1 cm.  •  This is  very Limited examination poor windows  •  Grossly the LV might be normal  •  Both atria seems likely enalrged  •  Rv could not be seens well  •  Valve could not be evaluated  •  No pericardial effusion       Last Stress  Results for orders placed in visit on 10/25/23    Stress Test With Myocardial Perfusion One Day    Interpretation Summary  •  Left ventricular ejection fraction is borderline normal (Calculated EF = 50%). and dropps to 40% at stress  •  Myocardial perfusion imaging indicates a moderate-sized, moderate-to-severe area of ischemia located in the apex.  •  Impressions are consistent with a high risk study.  •  Findings consistent with an indeterminate ECG stress test.       Last Cardiac Monitor      Last Cath  No results found for this or any previous visit.        Objective  Vitals:    11/15/23 1328   BP: 144/74   BP Location: Right arm   Patient Position: Sitting   Cuff Size: Adult   Pulse: 87   Resp: 18   Temp: 96 °F (35.6 °C)   TempSrc: Infrared   SpO2: 90%   Weight: 132 kg (290 lb)   Height: 188 cm (74\")   PainSc: 0-No pain     Body mass index is 37.23 kg/m².     Physical Exam  Vitals reviewed.   Constitutional:       General: Awake.      Appearance: Normal and healthy appearance. Not in distress.   Neck:      Vascular: No JVR. JVD normal.   Pulmonary:      Effort: Pulmonary effort is normal.      Breath sounds: Normal breath sounds. No wheezing. No rhonchi. No rales.   Chest:      Chest wall: Not tender to palpatation.   Cardiovascular:      PMI at left midclavicular line. " Bradycardia present. Regular rhythm. Normal S1. Normal S2.       Murmurs: There is no murmur.      No gallop.  No click. No rub.   Pulses:     Intact distal pulses.   Edema:     Thigh: bilateral trace edema of the thigh.     Pretibial: bilateral trace edema of the pretibial area.     Ankle: bilateral trace edema of the ankle.     Feet: bilateral trace edema of the feet.  Abdominal:      General: Bowel sounds are normal.      Palpations: Abdomen is soft.      Tenderness: There is no abdominal tenderness.   Musculoskeletal: Normal range of motion.         General: No tenderness. Skin:     General: Skin is warm and dry.   Neurological:      General: No focal deficit present.      Mental Status: Alert and oriented to person, place and time.   Psychiatric:         Behavior: Behavior is cooperative.        Diagnostic Data    ECG 12 Lead    Date/Time: 11/15/2023 3:08 PM  Performed by: Brooke Jernigan APRN    Authorized by: Brooke Jernigan APRN  Comparison: compared with previous ECG from 11/3/2023  Comparison to previous ECG: Atrial fibrillation now absent  Rhythm: sinus bradycardia  Rate: bradycardic  BPM: 51  Conduction: right bundle branch block and left posterior fascicular block  QRS axis: right  Other findings: non-specific ST-T wave changes    Clinical impression: abnormal EKG        Advance Care Planning          Assessment and Plan  Diagnoses and all orders for this visit:    1. Atrial fibrillation with RVR (Primary)  S/P successful SONIA/ECV 11/8/23, SB today RBBB, Hr 51, still mild prolongation QT interval, he stopped metoprolol and trazodone. On Eliquis 5 bid and Sotalol 80 bid, denies bleeding. Continue plan.   -     Case Request Cath Lab: Cardiac Catheterization/Vascular Study    2. Acute HFrEF (heart failure with reduced ejection fraction)  Recent Ef by SONIA 50%, reduced during stress portion of nuclear to 40%, on Entresto 24/26, Aldactone, continue plan.   -     Case Request Cath Lab: Cardiac  Catheterization/Vascular Study  -     sacubitril-valsartan (Entresto) 49-51 MG tablet; Take 1 tablet by mouth 2 (Two) Times a Day.  Dispense: 180 tablet; Refill: 2  -     Basic Metabolic Panel    3. Coronary artery disease of native artery of native heart with stable angina pectoris  By cath 2021, PDA 50%, LAD 30-40%, 1st Dx 50%, he has not been taking medications, cholesterol and DM not controlled. Abnormal Nuclear revealing moderate size, moderately severe area of ischemia in [place]. Will schedule for cath, jose Lira.   -     Case Request Cath Lab: Cardiac Catheterization/Vascular Study    4. Dyspnea on exertion  No major improvement despite conversion to SR. Known SALIMA, asthma, sees Dr. Valdivia pulmonology, Also known CAD, HF with elevated proBNP and no improvement with diuretic use. Will send for cath.   -     Case Request Cath Lab: Cardiac Catheterization/Vascular Study    5. Mixed hyperlipidemia  On Lipitor 80 recently increased and Zetia, , trig 267 October 2023, will recheck January 2023.     6. Primary hypertension  On Entresto 24/26, Aldactone 25, BP today is elevated will further increase to 49/51 bid, labs today.     7. Pre-operative cardiovascular examination  No clearance at this time. Recommendation pending cath.     8. RBBB    9. Type 2 diabetes mellitus without complication, without long-term current use of insulin         Return in about 6 weeks (around 12/27/2023) for Recheck, Brooke EDWARDS.    JERRY Lang  11/15/2023    Part of this note may be an electronic transcription/translation of spoken language to printed text using the Dragon Dictation System.

## 2023-11-16 LAB
BUN SERPL-MCNC: 14 MG/DL (ref 8–27)
BUN/CREAT SERPL: 14 (ref 10–24)
CALCIUM SERPL-MCNC: 9.5 MG/DL (ref 8.6–10.2)
CHLORIDE SERPL-SCNC: 104 MMOL/L (ref 96–106)
CO2 SERPL-SCNC: 24 MMOL/L (ref 20–29)
CREAT SERPL-MCNC: 1 MG/DL (ref 0.76–1.27)
EGFRCR SERPLBLD CKD-EPI 2021: 78 ML/MIN/1.73
GLUCOSE SERPL-MCNC: 98 MG/DL (ref 70–99)
POTASSIUM SERPL-SCNC: 4.5 MMOL/L (ref 3.5–5.2)
SODIUM SERPL-SCNC: 141 MMOL/L (ref 134–144)

## 2023-11-17 ENCOUNTER — TELEPHONE (OUTPATIENT)
Dept: CARDIOLOGY | Facility: CLINIC | Age: 78
End: 2023-11-17
Payer: MEDICARE

## 2023-11-17 NOTE — TELEPHONE ENCOUNTER
Caller: SIRI SELF    Relationship: Emergency Contact    Best call back number: [phone]     What is the best time to reach you: ANYTIME, LEAVE VM     What was the call regarding: SOMEONE IN OFFICE WAS TRYING TO HELP PT GET LOW COST MEDS, PTS DAUGHTER WAS TRYING TO GET INCOME verification AND SHE IS HAVING TROUBLE GETTING HER HANDS ON THIS. THEY ARE NEEDING A SS LETTER, WONDERING IF THERE WAS ANYTHING ELSE THEY COULD USE. PT'S DAUGHTER WANTED TO LET US KNOW SHE DIDN'T FORGET ABOUT DOING THIS, JUST HAVING TROUBLE. PLEASE ADVISE.     Is it okay if the provider responds through MyChart: CALL

## 2023-11-22 ENCOUNTER — PREP FOR SURGERY (OUTPATIENT)
Dept: OTHER | Facility: HOSPITAL | Age: 78
End: 2023-11-22
Payer: MEDICARE

## 2023-11-22 RX ORDER — SODIUM CHLORIDE 0.9 % (FLUSH) 0.9 %
10 SYRINGE (ML) INJECTION EVERY 12 HOURS SCHEDULED
OUTPATIENT
Start: 2023-11-22

## 2023-11-22 RX ORDER — SODIUM CHLORIDE 0.9 % (FLUSH) 0.9 %
10 SYRINGE (ML) INJECTION AS NEEDED
OUTPATIENT
Start: 2023-11-22

## 2023-11-22 RX ORDER — SODIUM CHLORIDE 9 MG/ML
40 INJECTION, SOLUTION INTRAVENOUS AS NEEDED
OUTPATIENT
Start: 2023-11-22

## 2023-11-22 RX ORDER — ASPIRIN 81 MG/1
81 TABLET ORAL DAILY
OUTPATIENT
Start: 2023-11-23

## 2023-11-22 RX ORDER — ASPIRIN 81 MG/1
324 TABLET, CHEWABLE ORAL ONCE
OUTPATIENT
Start: 2023-11-22 | End: 2023-11-22

## 2023-11-29 ENCOUNTER — HOSPITAL ENCOUNTER (OUTPATIENT)
Facility: HOSPITAL | Age: 78
Setting detail: HOSPITAL OUTPATIENT SURGERY
Discharge: HOME | End: 2023-11-29
Attending: INTERNAL MEDICINE | Admitting: INTERNAL MEDICINE
Payer: MEDICARE

## 2023-11-29 VITALS
BODY MASS INDEX: 40.94 KG/M2 | HEART RATE: 51 BPM | SYSTOLIC BLOOD PRESSURE: 152 MMHG | HEIGHT: 70 IN | TEMPERATURE: 97.2 F | RESPIRATION RATE: 16 BRPM | WEIGHT: 286 LBS | DIASTOLIC BLOOD PRESSURE: 79 MMHG | OXYGEN SATURATION: 92 %

## 2023-11-29 DIAGNOSIS — I50.21 ACUTE HFREF (HEART FAILURE WITH REDUCED EJECTION FRACTION): ICD-10-CM

## 2023-11-29 DIAGNOSIS — I48.91 ATRIAL FIBRILLATION WITH RVR: ICD-10-CM

## 2023-11-29 DIAGNOSIS — R06.09 DYSPNEA ON EXERTION: ICD-10-CM

## 2023-11-29 DIAGNOSIS — I25.118 CORONARY ARTERY DISEASE OF NATIVE ARTERY OF NATIVE HEART WITH STABLE ANGINA PECTORIS: ICD-10-CM

## 2023-11-29 LAB
ANION GAP SERPL CALCULATED.3IONS-SCNC: 13 MMOL/L (ref 5–15)
BUN BLDA-MCNC: 21 MG/DL (ref 8–26)
BUN SERPL-MCNC: 16 MG/DL (ref 8–23)
BUN/CREAT SERPL: 15 (ref 7–25)
CA-I BLDA-SCNC: 1.25 MMOL/L (ref 1.2–1.32)
CALCIUM SPEC-SCNC: 9.3 MG/DL (ref 8.6–10.5)
CHLORIDE BLDA-SCNC: 104 MMOL/L (ref 98–109)
CHLORIDE SERPL-SCNC: 104 MMOL/L (ref 98–107)
CO2 BLDA-SCNC: 31 MMOL/L (ref 24–29)
CO2 SERPL-SCNC: 24 MMOL/L (ref 22–29)
CREAT BLDA-MCNC: 1 MG/DL (ref 0.6–1.3)
CREAT SERPL-MCNC: 1.07 MG/DL (ref 0.76–1.27)
DEPRECATED RDW RBC AUTO: 49.1 FL (ref 37–54)
EGFRCR SERPLBLD CKD-EPI 2021: 71.5 ML/MIN/1.73
EGFRCR SERPLBLD CKD-EPI 2021: 77.5 ML/MIN/1.73
ERYTHROCYTE [DISTWIDTH] IN BLOOD BY AUTOMATED COUNT: 14.3 % (ref 12.3–15.4)
GLUCOSE BLDC GLUCOMTR-MCNC: 140 MG/DL (ref 70–130)
GLUCOSE SERPL-MCNC: 137 MG/DL (ref 65–99)
HCT VFR BLD AUTO: 50.9 % (ref 37.5–51)
HCT VFR BLDA CALC: 51 % (ref 38–51)
HGB BLD-MCNC: 16.6 G/DL (ref 13–17.7)
HGB BLDA-MCNC: 17.3 G/DL (ref 12–17)
MCH RBC QN AUTO: 30.3 PG (ref 26.6–33)
MCHC RBC AUTO-ENTMCNC: 32.6 G/DL (ref 31.5–35.7)
MCV RBC AUTO: 93.1 FL (ref 79–97)
PLATELET # BLD AUTO: 126 10*3/MM3 (ref 140–450)
PMV BLD AUTO: 11.4 FL (ref 6–12)
POTASSIUM BLDA-SCNC: 5.4 MMOL/L (ref 3.5–4.9)
POTASSIUM SERPL-SCNC: 4.4 MMOL/L (ref 3.5–5.2)
RBC # BLD AUTO: 5.47 10*6/MM3 (ref 4.14–5.8)
SODIUM BLD-SCNC: 142 MMOL/L (ref 138–146)
SODIUM SERPL-SCNC: 141 MMOL/L (ref 136–145)
WBC NRBC COR # BLD AUTO: 8.84 10*3/MM3 (ref 3.4–10.8)

## 2023-11-29 PROCEDURE — 25810000003 SODIUM CHLORIDE 0.9 % SOLUTION

## 2023-11-29 PROCEDURE — 25510000001 IOPAMIDOL PER 1 ML: Performed by: INTERNAL MEDICINE

## 2023-11-29 PROCEDURE — 93458 L HRT ARTERY/VENTRICLE ANGIO: CPT

## 2023-11-29 PROCEDURE — C1894 INTRO/SHEATH, NON-LASER: HCPCS | Performed by: INTERNAL MEDICINE

## 2023-11-29 PROCEDURE — 80047 BASIC METABLC PNL IONIZED CA: CPT

## 2023-11-29 PROCEDURE — 25010000002 MIDAZOLAM PER 1 MG: Performed by: INTERNAL MEDICINE

## 2023-11-29 PROCEDURE — 80048 BASIC METABOLIC PNL TOTAL CA: CPT

## 2023-11-29 PROCEDURE — C1760 CLOSURE DEV, VASC: HCPCS | Performed by: INTERNAL MEDICINE

## 2023-11-29 PROCEDURE — 25010000002 FENTANYL CITRATE (PF) 50 MCG/ML SOLUTION: Performed by: INTERNAL MEDICINE

## 2023-11-29 PROCEDURE — C1769 GUIDE WIRE: HCPCS | Performed by: INTERNAL MEDICINE

## 2023-11-29 PROCEDURE — 85027 COMPLETE CBC AUTOMATED: CPT

## 2023-11-29 PROCEDURE — 99152 MOD SED SAME PHYS/QHP 5/>YRS: CPT

## 2023-11-29 PROCEDURE — 85014 HEMATOCRIT: CPT

## 2023-11-29 RX ORDER — TRAZODONE HYDROCHLORIDE 50 MG/1
50 TABLET ORAL NIGHTLY
COMMUNITY
End: 2023-12-04

## 2023-11-29 RX ORDER — MIDAZOLAM HYDROCHLORIDE 1 MG/ML
INJECTION INTRAMUSCULAR; INTRAVENOUS
Status: DISCONTINUED | OUTPATIENT
Start: 2023-11-29 | End: 2023-11-29 | Stop reason: HOSPADM

## 2023-11-29 RX ORDER — CELECOXIB 200 MG/1
200 CAPSULE ORAL 2 TIMES DAILY
COMMUNITY
End: 2023-12-04

## 2023-11-29 RX ORDER — LIDOCAINE HYDROCHLORIDE 10 MG/ML
INJECTION, SOLUTION EPIDURAL; INFILTRATION; INTRACAUDAL; PERINEURAL
Status: DISCONTINUED | OUTPATIENT
Start: 2023-11-29 | End: 2023-11-29 | Stop reason: HOSPADM

## 2023-11-29 RX ORDER — SODIUM CHLORIDE 0.9 % (FLUSH) 0.9 %
10 SYRINGE (ML) INJECTION AS NEEDED
Status: DISCONTINUED | OUTPATIENT
Start: 2023-11-29 | End: 2023-11-29 | Stop reason: HOSPADM

## 2023-11-29 RX ORDER — NICARDIPINE HCL-0.9% SOD CHLOR 1 MG/10 ML
SYRINGE (ML) INTRAVENOUS
Status: DISCONTINUED | OUTPATIENT
Start: 2023-11-29 | End: 2023-11-29 | Stop reason: HOSPADM

## 2023-11-29 RX ORDER — FENTANYL CITRATE 50 UG/ML
INJECTION, SOLUTION INTRAMUSCULAR; INTRAVENOUS
Status: DISCONTINUED | OUTPATIENT
Start: 2023-11-29 | End: 2023-11-29 | Stop reason: HOSPADM

## 2023-11-29 RX ORDER — ASPIRIN 81 MG/1
81 TABLET ORAL DAILY
Status: DISCONTINUED | OUTPATIENT
Start: 2023-11-30 | End: 2023-11-29 | Stop reason: HOSPADM

## 2023-11-29 RX ORDER — HEPARIN SODIUM 1000 [USP'U]/ML
INJECTION, SOLUTION INTRAVENOUS; SUBCUTANEOUS
Status: DISCONTINUED | OUTPATIENT
Start: 2023-11-29 | End: 2023-11-29 | Stop reason: HOSPADM

## 2023-11-29 RX ORDER — SODIUM CHLORIDE 9 MG/ML
40 INJECTION, SOLUTION INTRAVENOUS AS NEEDED
Status: DISCONTINUED | OUTPATIENT
Start: 2023-11-29 | End: 2023-11-29 | Stop reason: HOSPADM

## 2023-11-29 RX ORDER — SODIUM CHLORIDE 9 MG/ML
125 INJECTION, SOLUTION INTRAVENOUS CONTINUOUS
Status: DISCONTINUED | OUTPATIENT
Start: 2023-11-29 | End: 2023-11-29 | Stop reason: HOSPADM

## 2023-11-29 RX ORDER — NITROGLYCERIN 0.4 MG/1
0.4 TABLET SUBLINGUAL
Status: DISCONTINUED | OUTPATIENT
Start: 2023-11-29 | End: 2023-11-29 | Stop reason: HOSPADM

## 2023-11-29 RX ORDER — SODIUM CHLORIDE 0.9 % (FLUSH) 0.9 %
10 SYRINGE (ML) INJECTION EVERY 12 HOURS SCHEDULED
Status: DISCONTINUED | OUTPATIENT
Start: 2023-11-29 | End: 2023-11-29 | Stop reason: HOSPADM

## 2023-11-29 RX ORDER — ASPIRIN 81 MG/1
324 TABLET, CHEWABLE ORAL ONCE
Status: COMPLETED | OUTPATIENT
Start: 2023-11-29 | End: 2023-11-29

## 2023-11-29 RX ADMIN — SODIUM CHLORIDE 396 ML: 9 INJECTION, SOLUTION INTRAVENOUS at 09:52

## 2023-11-29 RX ADMIN — ASPIRIN 81 MG CHEWABLE TABLET 324 MG: 81 TABLET CHEWABLE at 09:51

## 2023-11-29 NOTE — Clinical Note
A 6 fr sheath was  inserted with ultrasound guidance into the right radial artery. Sheath insertion not delayed. NEEDLE ACCESS

## 2023-11-29 NOTE — INTERVAL H&P NOTE
H&P reviewed. The patient was examined and there are no changes to the H&P.      Vitals:    11/29/23 0916   BP: 167/93   Pulse: 66   Resp:    Temp:    SpO2: 96%     Labs pending. Will proceed if acceptable.      Patient is here today for left heart catheterization +/- CBI via right radial access. Procedure, risks, and benefits have been discussed with the patient and he is agreeable to proceed.  Further recommendations to follow.    Electronically signed by JERRY Hernandez, 11/29/23, 9:40 AM EST.    The risks, benefits, and alternative options of cardiac catheterization were discussed with the patient.  The risks include death, MI, stroke, infection, vascular injury requiring surgical repair and/or blood transfusion, coronary dissection, renal dysfunction/failure, allergic reaction, emergent CABG.  If PCI is needed there is a 1-2% risk of emergent CABG.  The patient is agreeable for cardiac catheterization, possible PCI or CABG. Plan is to proceed with cardiac catheterization and possible PCI.     Glen Lira MD, [facility], [facility]  Interventional Cardiology  11/29/23  09:47 EST

## 2023-11-29 NOTE — DISCHARGE INSTR - ACTIVITY
HOLD Eliquis until evening dose on Thursday 11/30  HOLD Metformin until evening dose on Friday 12/1

## 2023-11-29 NOTE — Clinical Note
A 6 fr sheath was  inserted with ultrasound guidance into the right ulnar artery and right ulnar artery. Sheath insertion not delayed.

## 2023-11-30 ENCOUNTER — TELEPHONE (OUTPATIENT)
Dept: CARDIOLOGY | Facility: CLINIC | Age: 78
End: 2023-11-30
Payer: MEDICARE

## 2023-11-30 NOTE — TELEPHONE ENCOUNTER
----- Message from JERRY Lang sent at 11/30/2023  8:42 AM EST -----  Can you call and let him know that I reviewed his cath result and he has only moderate coronary disease. He is medically managed for this and we will continue to monitor his cholesterol. At this time this is good news for him. So his shortness of breath is not being caused by a problem with his coronary arteries. He is scheduled to see me back at the beginning of January.     Did they go through his wrist? If yes how does his site look? Tell him to make sure to hydrate well with water for the next 2 days as he had some IV dye.           Did they go through his wrist? No they went thru the leg . Pt asked if they changed in medications . She was told I did not see any in the chart but she could ask nurses there . She said ok . She is going to call us back about the site on his leg . Please advise?

## 2023-11-30 NOTE — TELEPHONE ENCOUNTER
Brooke Jernigan APRN Burris, Theresa, MA  Caller: Unspecified (Today,  9:11 AM)  I dont see that they added any medication but he should be holding his metformin for 2 days also, can resume Saturday as normal.    Spoke with pt daughter  and she understood message

## 2023-12-01 ENCOUNTER — TELEPHONE (OUTPATIENT)
Dept: CARDIOLOGY | Facility: CLINIC | Age: 78
End: 2023-12-01

## 2023-12-01 NOTE — TELEPHONE ENCOUNTER
Unsure how to address, Please advise on leg image or do you want a appt?  Please advise on Numbing prior to procedure.

## 2023-12-01 NOTE — TELEPHONE ENCOUNTER
Caller: SIRI SELF    Relationship: Emergency Contact    Best call back number: [phone]    What is the best time to reach you: ANY    What was the call regarding: PATIENT'S DAUGHTER HAS A PICTURE OF HER DAD'S LEG AND IS INQUIRING IF YOU WANT THAT AND IF SO HOW WOULD SHE SEND IT? SHE IS ALSO CONCERNED ABOUT THE FACT HE WAS NOT NUMBED FOR THE PROCEDURE. IS THIS NORMAL? PLEASE CALL ASAP    Is it okay if the provider responds through MyChart: NO

## 2023-12-04 ENCOUNTER — OFFICE VISIT (OUTPATIENT)
Dept: FAMILY MEDICINE CLINIC | Facility: CLINIC | Age: 78
End: 2023-12-04
Payer: MEDICARE

## 2023-12-04 ENCOUNTER — TELEPHONE (OUTPATIENT)
Dept: CARDIOLOGY | Facility: CLINIC | Age: 78
End: 2023-12-04
Payer: MEDICARE

## 2023-12-04 VITALS
WEIGHT: 288 LBS | BODY MASS INDEX: 41.32 KG/M2 | OXYGEN SATURATION: 97 % | SYSTOLIC BLOOD PRESSURE: 120 MMHG | HEART RATE: 85 BPM | DIASTOLIC BLOOD PRESSURE: 80 MMHG

## 2023-12-04 DIAGNOSIS — K21.9 CHRONIC GERD: ICD-10-CM

## 2023-12-04 DIAGNOSIS — F32.A ANXIETY AND DEPRESSION: ICD-10-CM

## 2023-12-04 DIAGNOSIS — F41.9 ANXIETY AND DEPRESSION: ICD-10-CM

## 2023-12-04 DIAGNOSIS — J45.40 MODERATE PERSISTENT ASTHMA WITHOUT COMPLICATION: ICD-10-CM

## 2023-12-04 DIAGNOSIS — E11.9 TYPE 2 DIABETES MELLITUS WITHOUT COMPLICATION, WITHOUT LONG-TERM CURRENT USE OF INSULIN: ICD-10-CM

## 2023-12-04 RX ORDER — BUSPIRONE HYDROCHLORIDE 10 MG/1
TABLET ORAL
Qty: 180 TABLET | Refills: 1 | Status: SHIPPED | OUTPATIENT
Start: 2023-12-04

## 2023-12-04 RX ORDER — SERTRALINE HYDROCHLORIDE 100 MG/1
200 TABLET, FILM COATED ORAL NIGHTLY
Qty: 180 TABLET | Refills: 1 | Status: SHIPPED | OUTPATIENT
Start: 2023-12-04

## 2023-12-04 RX ORDER — PANTOPRAZOLE SODIUM 40 MG/1
40 TABLET, DELAYED RELEASE ORAL DAILY
Qty: 90 TABLET | Refills: 1 | Status: SHIPPED | OUTPATIENT
Start: 2023-12-04

## 2023-12-04 RX ORDER — METFORMIN HYDROCHLORIDE 500 MG/1
1000 TABLET, EXTENDED RELEASE ORAL
Qty: 180 TABLET | Refills: 1 | Status: SHIPPED | OUTPATIENT
Start: 2023-12-04

## 2023-12-04 RX ORDER — MONTELUKAST SODIUM 10 MG/1
10 TABLET ORAL NIGHTLY
Qty: 90 TABLET | Refills: 1 | Status: SHIPPED | OUTPATIENT
Start: 2023-12-04

## 2023-12-04 NOTE — TELEPHONE ENCOUNTER
"Pt came into office wanting to speak to Brooke. I informed him that she was in clinic and if he leaves a message with me, we can call back when available. Pt refused to give me any information other than to tell me it is about \"a heart test that I am upset about.\" Please advise.  "

## 2023-12-04 NOTE — PROGRESS NOTES
Chief Complaint  Hypertension    Subjective    History of Present Illness:  Kerrie Medrano is a 78 y.o. male who presents today for followup after preop visit with new Afib.    He is now on eliquis along with sotalol and entresto for Afib with CHF.      Chronic insomnia and chronic pain remain manageable with amitriptyline.  He is off Celebrex after starting on eliquis.    Anxiety stable with zoloft and buspar combination.    No problems with current regimen for hypertension, hyperlipidemia, anxiety, depression, GERD, and environmental asthma.     Dr Valdivia has retired and he would like trelegy refilled through us.           Objective   Vital Signs:   /80   Pulse 85   Wt 131 kg (288 lb)   SpO2 97%   BMI 41.32 kg/m²     Review of Systems   Constitutional:  Negative for appetite change, chills and fever.   HENT:  Negative for hearing loss.    Eyes:  Negative for blurred vision.   Respiratory:  Negative for chest tightness.    Cardiovascular:  Negative for chest pain.   Gastrointestinal:  Negative for abdominal pain.   Musculoskeletal:  Positive for arthralgias. Negative for gait problem.   Skin:  Negative for rash.   Psychiatric/Behavioral:  Negative for depressed mood.        Past History:  Medical History: has a past medical history of Benign essential hypertension, Disorder of hair and hair follicles, and Joint pain.   Surgical History: has a past surgical history that includes Colonoscopy (04/2009); Skin cancer excision; Knee surgery; Appendectomy (1960); Cardiac catheterization; and Cardiac catheterization (Left, 11/29/2023).   Family History: family history includes Heart attack in his brother, father, and mother; Skin cancer (age of onset: 50) in his brother.   Social History: reports that he has never smoked. He has never been exposed to tobacco smoke. He has never used smokeless tobacco. He reports that he does not drink alcohol and does not use drugs.      Current Outpatient Medications:     apixaban  (ELIQUIS) 5 MG tablet tablet, Take 1 tablet by mouth 2 (Two) Times a Day., Disp: 180 tablet, Rfl: 3    atorvastatin (LIPITOR) 80 MG tablet, Take 1 tab po daily for cholesterol, Disp: 90 tablet, Rfl: 2    busPIRone (BUSPAR) 10 MG tablet, Take 1 tab po Bid for mood, Disp: 180 tablet, Rfl: 1    Cholecalciferol (Vitamin D3) 50 MCG (2000 UT) capsule, Take 1 capsule by mouth Daily., Disp: , Rfl:     ezetimibe (ZETIA) 10 MG tablet, Take 1 tablet by mouth Daily., Disp: 90 tablet, Rfl: 3    Fluticasone-Umeclidin-Vilant (Trelegy Ellipta) 100-62.5-25 MCG/ACT inhaler, Inhale 1 puff Daily., Disp: 3 each, Rfl: 1    metFORMIN ER (GLUCOPHAGE-XR) 500 MG 24 hr tablet, Take 2 tablets by mouth Daily With Dinner., Disp: 180 tablet, Rfl: 1    montelukast (SINGULAIR) 10 MG tablet, Take 1 tablet by mouth Every Night., Disp: 90 tablet, Rfl: 1    pantoprazole (PROTONIX) 40 MG EC tablet, Take 1 tablet by mouth Daily., Disp: 90 tablet, Rfl: 1    sacubitril-valsartan (Entresto) 49-51 MG tablet, Take 1 tablet by mouth 2 (Two) Times a Day., Disp: 180 tablet, Rfl: 2    sertraline (ZOLOFT) 100 MG tablet, Take 2 tablets by mouth Every Night. TAKE ONE TABLET BY MOUTH DAILY FOR MOOD, Disp: 180 tablet, Rfl: 1    Sotalol HCl AF 80 MG tablet, Take 1 tablet by mouth 2 (Two) Times a Day., Disp: 60 tablet, Rfl: 1    spironolactone (ALDACTONE) 25 MG tablet, Take 1 tablet by mouth Daily., Disp: 30 tablet, Rfl: 2    Allergies: Patient has no known allergies.    Physical Exam  Constitutional:       Appearance: He is obese.   HENT:      Head: Normocephalic.      Right Ear: External ear normal.      Left Ear: External ear normal.      Nose: Nose normal.   Eyes:      Pupils: Pupils are equal, round, and reactive to light.   Cardiovascular:      Rate and Rhythm: Normal rate and regular rhythm.      Heart sounds: Normal heart sounds.   Pulmonary:      Effort: Pulmonary effort is normal.      Breath sounds: Normal breath sounds. No wheezing.      Comments: Coarse BS  bilat, no wheezing  Musculoskeletal:      Cervical back: Normal range of motion.   Skin:     Comments: Scattered Aks and solar lentigos.  Given information to est care with Derm (Dr Rosa)   Neurological:      General: No focal deficit present.      Mental Status: He is alert.   Psychiatric:         Mood and Affect: Mood normal.         Behavior: Behavior normal.         Thought Content: Thought content normal.          Result Review                   Assessment and Plan  Diagnoses and all orders for this visit:    1. Type 2 diabetes mellitus without complication, without long-term current use of insulin  Assessment & Plan:  Diabetes is improving with treatment.   Continue current treatment regimen.  Diabetes will be reassessed  4 mos .    Orders:  -     metFORMIN ER (GLUCOPHAGE-XR) 500 MG 24 hr tablet; Take 2 tablets by mouth Daily With Dinner.  Dispense: 180 tablet; Refill: 1    2. Moderate persistent asthma without complication  Assessment & Plan:  Asthma is improving with treatment.  The patient is experiencing monthly daytime asthma symptoms. He is experiencing no nighttime asthma symptoms.  Cont singulair, trelegy and prn Alb HFA        Orders:  -     montelukast (SINGULAIR) 10 MG tablet; Take 1 tablet by mouth Every Night.  Dispense: 90 tablet; Refill: 1  -     Fluticasone-Umeclidin-Vilant (Trelegy Ellipta) 100-62.5-25 MCG/ACT inhaler; Inhale 1 puff Daily.  Dispense: 3 each; Refill: 1    3. Chronic GERD  Assessment & Plan:  Well-controlled with Protonix.  No dysphagia.  Ongoing treatment of GERD does help with asthma control as well    Orders:  -     pantoprazole (PROTONIX) 40 MG EC tablet; Take 1 tablet by mouth Daily.  Dispense: 90 tablet; Refill: 1    4. Anxiety and depression  Assessment & Plan:  Patient's depression is recurrent and is mild without psychosis. Their depression is currently in partial remission and the condition is improving with treatment. This will be reassessed at the next regular  appointment. F/U as described:patient will continue current medication therapy.    Orders:  -     sertraline (ZOLOFT) 100 MG tablet; Take 2 tablets by mouth Every Night. TAKE ONE TABLET BY MOUTH DAILY FOR MOOD  Dispense: 180 tablet; Refill: 1  -     busPIRone (BUSPAR) 10 MG tablet; Take 1 tab po Bid for mood  Dispense: 180 tablet; Refill: 1                   Follow Up  Return in about 4 months (around 4/4/2024) for Med recheck, Fasting for labs at appointment (but drink water!).    Jermaine Lopez MD

## 2023-12-04 NOTE — ASSESSMENT & PLAN NOTE
Diabetes is improving with treatment.   Continue current treatment regimen.  Diabetes will be reassessed  4 mos .

## 2023-12-04 NOTE — ASSESSMENT & PLAN NOTE
Asthma is improving with treatment.  The patient is experiencing monthly daytime asthma symptoms. He is experiencing no nighttime asthma symptoms.  Cont singulair, trelegy and prn Alb HFA

## 2023-12-04 NOTE — ASSESSMENT & PLAN NOTE
Well-controlled with Protonix.  No dysphagia.  Ongoing treatment of GERD does help with asthma control as well

## 2023-12-05 ENCOUNTER — OFFICE VISIT (OUTPATIENT)
Dept: CARDIOLOGY | Facility: CLINIC | Age: 78
End: 2023-12-05
Payer: MEDICARE

## 2023-12-05 VITALS
WEIGHT: 284 LBS | SYSTOLIC BLOOD PRESSURE: 102 MMHG | HEIGHT: 70 IN | RESPIRATION RATE: 18 BRPM | OXYGEN SATURATION: 91 % | BODY MASS INDEX: 40.66 KG/M2 | HEART RATE: 82 BPM | DIASTOLIC BLOOD PRESSURE: 68 MMHG

## 2023-12-05 DIAGNOSIS — R06.09 DYSPNEA ON EXERTION: ICD-10-CM

## 2023-12-05 DIAGNOSIS — I25.118 CORONARY ARTERY DISEASE OF NATIVE ARTERY OF NATIVE HEART WITH STABLE ANGINA PECTORIS: ICD-10-CM

## 2023-12-05 DIAGNOSIS — I48.91 ATRIAL FIBRILLATION WITH RVR: Primary | ICD-10-CM

## 2023-12-05 DIAGNOSIS — E78.2 MIXED HYPERLIPIDEMIA: ICD-10-CM

## 2023-12-05 DIAGNOSIS — E11.9 TYPE 2 DIABETES MELLITUS WITHOUT COMPLICATION, WITHOUT LONG-TERM CURRENT USE OF INSULIN: ICD-10-CM

## 2023-12-05 DIAGNOSIS — I10 PRIMARY HYPERTENSION: ICD-10-CM

## 2023-12-05 DIAGNOSIS — I50.21 ACUTE HFREF (HEART FAILURE WITH REDUCED EJECTION FRACTION): ICD-10-CM

## 2023-12-05 DIAGNOSIS — I45.10 RBBB: ICD-10-CM

## 2023-12-05 PROBLEM — G47.33 OSA AND COPD OVERLAP SYNDROME: Status: ACTIVE | Noted: 2023-12-05

## 2023-12-05 PROBLEM — J44.9 OSA AND COPD OVERLAP SYNDROME: Status: ACTIVE | Noted: 2023-12-05

## 2023-12-05 RX ORDER — SOTALOL HYDROCHLORIDE 120 MG/1
120 TABLET ORAL 2 TIMES DAILY
Qty: 60 TABLET | Refills: 2 | Status: SHIPPED | OUTPATIENT
Start: 2023-12-05

## 2023-12-05 RX ORDER — FUROSEMIDE 20 MG/1
20 TABLET ORAL DAILY
Qty: 30 TABLET | Refills: 2 | Status: SHIPPED | OUTPATIENT
Start: 2023-12-05 | End: 2023-12-05

## 2023-12-05 RX ORDER — FUROSEMIDE 40 MG/1
40 TABLET ORAL DAILY
Qty: 30 TABLET | Refills: 2 | Status: SHIPPED | OUTPATIENT
Start: 2023-12-05

## 2023-12-05 NOTE — PROGRESS NOTES
Venipuncture Blood Specimen Collection  Venipuncture performed in clinic by Jose Shaw MA with good hemostasis. Patient tolerated the procedure well without complications.   12/05/23   Jose Shaw MA

## 2023-12-05 NOTE — PROGRESS NOTES
[facility] [place]  [facility] CARDIOLOGY  [address] [place] [address]  Dept: [phone]  Dept Fax: [phone]    Kerrie Medrano  1945    Follow Up Office Visit Note    History of Present Illness:  Kerrie Medrano is a 78 y.o. male who presents to the clinic for Follow-up. Atrial fibrillation, HFrEF, shortness of breath. He is s/p cath 11/29/23 after abnormal nuclear, cath showed mild to moderate disease LAD 20%, Cx mid LPDA 40-50%, he is medically managed, statin. Today he states to still be short of breath despite medical therapy with diuretics on Aldactone and Entresto, known HFmrEF. He is in atrial flutter Hr 98 today by EKG after recent successful ECV on 11/8/23, he is on Eliquis 5 bid and sotalol 80 bid, states compliance. He states to be tolerating medications well without complaints, no bleeding. No additional cardiac complaints today. Recent labs showed elevated K+ 5.4, will recollect today and further increase Sotalol to 120 bid, add Lasix. Follow closely in 1 week with follow up Ecg.       Addend: Limited Echo, SONIA revealed mildly reduced Ef 50-55%, EDWARD mild, no clots in HAZEL, mild RV enlargement, trivial AI, trivial MR, no pericardial effusion, mild plaques ascending aorta.       The following portions of the patient's history were reviewed and updated as appropriate: allergies, current medications, past family history, past medical history, past social history, past surgical history, and problem list.    Medications:  apixaban tablet  atorvastatin  busPIRone  Entresto tablet  ezetimibe  Fluticasone-Umeclidin-Vilant  furosemide  metFORMIN ER  montelukast  pantoprazole  sertraline  sotalol  spironolactone  Vitamin D3    Subjective  No Known Allergies     Past Medical History:   Diagnosis Date    Benign essential hypertension     Disorder of hair and hair follicles     Joint pain        Past Surgical History:   Procedure Laterality Date    APPENDECTOMY  1960    CARDIAC  "CATHETERIZATION      11/29/2023 PER DR. ARENAS    CARDIAC CATHETERIZATION Left 11/29/2023    Procedure: Cardiac Catheterization/Vascular Study;  Surgeon: Glen Arenas MD;  Location: [facility] INVASIVE LOCATION;  Service: Cardiovascular;  Laterality: Left;    COLONOSCOPY  04/2009    COLON POLYP-20 CM INFLAMMATORY POLYP, PLAN: REPEAT IN 5 YEARS, NO FH OF COLON CANCER    KNEE SURGERY      ARTHROCENTESIS OF THE RIGHT KNEE MEDIAL JOINT LINE  AND LEFT KNEE    SKIN CANCER EXCISION         Family History   Problem Relation Age of Onset    Heart attack Mother     Heart attack Father     Skin cancer Brother 50    Heart attack Brother         X3        Social History     Socioeconomic History    Marital status:    Tobacco Use    Smoking status: Never     Passive exposure: Never    Smokeless tobacco: Never   Vaping Use    Vaping Use: Never used   Substance and Sexual Activity    Alcohol use: Never    Drug use: Never    Sexual activity: Defer       Review of Systems   Respiratory:  Positive for shortness of breath.    All other systems reviewed and are negative.      Cardiovascular Procedures    Shared decision making utilized. Discussed invasive cath with patient including indication, benefit and risk including risk of death, stroke, heart attack, bleeding, bruising, infection, hematoma, perforation. The patient understands the need and risk of the procedure and is agreeable to proceed.     Objective  Vitals:    12/05/23 1314   BP: 102/68   BP Location: Right arm   Patient Position: Sitting   Cuff Size: Large Adult   Pulse: 82   Resp: 18   SpO2: 91%   Weight: 129 kg (284 lb)   Height: 177.8 cm (70\")   PainSc: 0-No pain     Body mass index is 40.75 kg/m².     Physical Exam  Vitals reviewed.   Constitutional:       General: Awake.      Appearance: Normal and healthy appearance. Overweight and not in distress.   Neck:      Vascular: No JVR. JVD normal.   Pulmonary:      Effort: Pulmonary effort is normal.      Breath " sounds: Normal breath sounds. No wheezing. No rhonchi. No rales.   Chest:      Chest wall: Not tender to palpatation.   Cardiovascular:      PMI at left midclavicular line. Normal rate. Regular rhythm. Normal S1. Normal S2.       Murmurs: There is no murmur.      No gallop.  No click. No rub.   Pulses:     Intact distal pulses.   Edema:     Pretibial: bilateral trace edema of the pretibial area.     Ankle: bilateral trace edema of the ankle.  Abdominal:      General: Bowel sounds are normal.      Palpations: Abdomen is soft.      Tenderness: There is no abdominal tenderness.   Musculoskeletal: Normal range of motion.         General: No tenderness. Skin:     General: Skin is warm and dry.   Neurological:      General: No focal deficit present.      Mental Status: Alert and oriented to person, place and time.   Psychiatric:         Behavior: Behavior is cooperative.        Diagnostic Data    ECG 12 Lead    Date/Time: 12/5/2023 3:25 PM  Performed by: Brooke Jernigan APRN    Authorized by: Brooke Jernigan APRN  Comparison: compared with previous ECG from 10/11/2023  Similar to previous ECG  Rhythm: atrial flutter  Rate: normal  BPM: 98  Conduction: right bundle branch block  QRS axis: right    Clinical impression: abnormal EKG        Advance Care Planning          Assessment and Plan  Diagnoses and all orders for this visit:    1. Atrial fibrillation with RVR (Primary)  Today in flutter Hr 98, after successful cardioversion 11/8/23, on Eliquis 5 bid and Sotalol 80 bid, he states compliance. Toprol was held due to bradycardic rate following Ecv, will further increase Sotalol to 120 bid follow closely 1 week with Ecv.   -     sotalol (Betapace) 120 MG tablet; Take 1 tablet by mouth 2 (Two) Times a Day.  Dispense: 60 tablet; Refill: 2  -     Basic Metabolic Panel    2. Acute HFrEF (heart failure with reduced ejection fraction)  Mildly reduced 40% during stress, on Entresto 49/51, Aldactone 25, recent  labs showed mildly increased K+ 5.4. proBNP 640. Still SOB. Will recollect today, add Lasix 40 qd, labs at follow up.   -     Basic Metabolic Panel  -     furosemide (LASIX) 40 MG tablet; Take 1 tablet by mouth Daily.  Dispense: 30 tablet; Refill: 2    3. Coronary artery disease of native artery of native heart with stable angina pectoris  Mild-Mod disease by cath 11/29/23, LAD 20%, Cx mid LPDA 40-50%, on statin. Continue medical management with risk factor reduction. He is non smoker. DM with A1C 7.0.     4. Dyspnea on exertion  No improvement, no major obstructive disease in cath, nonsmoker, known SALIMA with compliance. Per review of last pulmonology note Dr. Valdivia he has known Asthma-COPD overlap, per note also should have started Oxygen as needed with exertion, had spirometry which was abnormal and per note stated he was noncompliant with his CPAP. Known HF, will add Lasix. If no major improvement will strongly encourage that he follow with pulmonology.  Labs next visit.     5. Mixed hyperlipidemia  On Lipitor 80, zetia, , trig 267 October 2023, will recheck Lipid January. Continue plan    6. Primary hypertension  BP today is low 110/70, on Entresto 49/51 and Aldctone 25, continue plan. Labs    7. RBBB    8. Type 2 diabetes mellitus without complication, without long-term current use of insulin    9. SALIMA  Moderate by ENT note repeat study 4/1/23, revealing AHI of 18.2, low saturation of 53%, he was supposed to return for follow up for determination of supplemental O2, unsure as to whether this occurred. Will further discuss at follow up.          Return for Recheck, Brooke EDWARDS.    JERRY Lang  12/05/2023    Part of this note may be an electronic transcription/translation of spoken language to printed text using the Dragon Dictation System.

## 2023-12-06 ENCOUNTER — REFERRAL TRIAGE (OUTPATIENT)
Dept: CASE MANAGEMENT | Facility: OTHER | Age: 78
End: 2023-12-06
Payer: MEDICARE

## 2023-12-06 ENCOUNTER — TELEPHONE (OUTPATIENT)
Dept: CARDIOLOGY | Facility: CLINIC | Age: 78
End: 2023-12-06
Payer: MEDICARE

## 2023-12-06 DIAGNOSIS — E78.2 MIXED HYPERLIPIDEMIA: ICD-10-CM

## 2023-12-06 DIAGNOSIS — I25.118 CORONARY ARTERY DISEASE OF NATIVE ARTERY OF NATIVE HEART WITH STABLE ANGINA PECTORIS: ICD-10-CM

## 2023-12-06 DIAGNOSIS — I10 PRIMARY HYPERTENSION: ICD-10-CM

## 2023-12-06 DIAGNOSIS — J45.40 MODERATE PERSISTENT ASTHMA WITHOUT COMPLICATION: ICD-10-CM

## 2023-12-06 DIAGNOSIS — I48.91 ATRIAL FIBRILLATION WITH RVR: Primary | ICD-10-CM

## 2023-12-06 LAB
BUN SERPL-MCNC: 20 MG/DL (ref 8–27)
BUN/CREAT SERPL: 16 (ref 10–24)
CALCIUM SERPL-MCNC: 10 MG/DL (ref 8.6–10.2)
CHLORIDE SERPL-SCNC: 102 MMOL/L (ref 96–106)
CO2 SERPL-SCNC: 24 MMOL/L (ref 20–29)
CREAT SERPL-MCNC: 1.22 MG/DL (ref 0.76–1.27)
EGFRCR SERPLBLD CKD-EPI 2021: 61 ML/MIN/1.73
GLUCOSE SERPL-MCNC: 136 MG/DL (ref 70–99)
POTASSIUM SERPL-SCNC: 5.5 MMOL/L (ref 3.5–5.2)
SODIUM SERPL-SCNC: 145 MMOL/L (ref 134–144)

## 2023-12-06 NOTE — TELEPHONE ENCOUNTER
PC to daughter lulu to discuss his labs and visit from yesterday. Advised his potassium is still showing to be elevated 5.5 likely attributed to aldactone and further increase in Entresto last visit. I advised if he is taking multivitamin to stop. Also advised I added Lasix yesterday for him to take once daily which will likely help to correct his potassium level. She states she has been sick and is not sure when she will be able to get to him to help with his medications and recent changes. I did discuss with her possible an alternative pharmacy [facility] which could help with putting his medications daily in pill packs and also have a delivery program which he could utilize.     At this time we also discussed I reviewed his recent records from pulmonology and he does have asthma-COPD overlap, and additionally moderate sleep apnea. Unknown if he has full compliance with CPAP. Per these notes they were discussing adding supplemental oxygen for him with activities and also for night time during CPAP use. Per daughter he does use oxygen at home but does not take with him to appointments.     I will work on seeing if there is patient assistance program which might be able to help with alleviating tasks from daughter and discuss once I know specifics.     She did not have additional questions or concerns today.

## 2023-12-07 ENCOUNTER — TELEPHONE (OUTPATIENT)
Dept: CARDIOLOGY | Facility: CLINIC | Age: 78
End: 2023-12-07
Payer: MEDICARE

## 2023-12-07 NOTE — TELEPHONE ENCOUNTER
Caller: SIRI SELF    Relationship: Emergency Contact    Best call back number: [phone]    What is the best time to reach you: ANY    What was the call regarding: PLEASE CALL PATIENT'S DAUGHTER BACK. PATIENT CALLED HER AND ADVISED HE IS HAVING A LOT OF PAIN AT THE INJECTION SITE AND GOING DOWN HIS LEG. PLEASE CALL ASAPSHE IS NOT SURE WHETHER THIS IS NORMAL AND REQUESTING SHE BE CALLED BACK BECAUSE HER FATHER IS HARD OF HEARING.     Is it okay if the provider responds through MyChart: NO

## 2023-12-07 NOTE — TELEPHONE ENCOUNTER
What was the call regarding: PLEASE CALL PATIENT'S DAUGHTER BACK. PATIENT CALLED HER AND ADVISED HE IS HAVING A LOT OF PAIN AT THE INJECTION SITE AND GOING DOWN HIS LEG. PLEASE CALL ASALINDAHE IS NOT SURE WHETHER THIS IS NORMAL AND REQUESTING SHE BE CALLED BACK BECAUSE HER FATHER IS HARD OF HEARING.          Pt said when he woke up this morning he started having pain.  Pt said the area were he had the cath and his  knee are hurting . Pt said 8-10 pain . Please advise?

## 2023-12-08 ENCOUNTER — PATIENT OUTREACH (OUTPATIENT)
Dept: CASE MANAGEMENT | Facility: OTHER | Age: 78
End: 2023-12-08
Payer: MEDICARE

## 2023-12-08 NOTE — OUTREACH NOTE
Social Work Assessment  Questions/Answers      Flowsheet Row Most Recent Value   Referral Source physician   Reason for Consult community resources, medication concerns   Preferred Language English   Advance Care Planning Reviewed no concerns identified   People in Home alone   Current Living Arrangements home   Potentially Unsafe Housing Conditions none   In the past 12 months has the electric, gas, oil, or water company threatened to shut off services in your home? No   Primary Care Provided by self   Family Caregiver if Needed child(wilver), adult   Quality of Family Relationships helpful, involved, supportive   Employment Status retired   Source of Income social security   Application for Public Assistance not applied   Usual Activity Tolerance good   Current Activity Tolerance good   Medications assistive person   Meal Preparation independent   Housekeeping independent   Laundry independent   Shopping independent   Sources of Support adult child(wilver)          SDOH updated and reviewed with the patient during this program:  Financial Resource Strain: Low Risk  (12/8/2023)    Overall Financial Resource Strain (CARDIA)     Difficulty of Paying Living Expenses: Not hard at all      Food Insecurity: No Food Insecurity (12/8/2023)    Hunger Vital Sign     Worried About Running Out of Food in the Last Year: Never true     Ran Out of Food in the Last Year: Never true      Transportation Needs: No Transportation Needs (12/8/2023)    PRAPARE - Transportation     Lack of Transportation (Medical): No     Lack of Transportation (Non-Medical): No      Housing Stability: Not At Risk (12/8/2023)    Housing Stability     Current Living Arrangements: home     Potentially Unsafe Housing Conditions: none      Continuing Care   Community & DME   [facility]    [address] [address]    Phone: [phone]     Patient Outreach    MSW outreach to patient's daughter Zulma to discuss community resources  available to assist with patient. Patient's daughter states that patient is unable to read and she manages his medications. MSW discussed switching medications to pill packs so that patient's daughter does not have to sort medications weekly. Patient's daughter hesitant to contact [facility] and states that she will speak with patient about switch. Patient's daughter and MSW discussed in home supports for patient and patient's daughter is declining services at this time stating that he is able to mange at home independently. Patient's daughter states that patient will sometimes hire an individual to help with housekeeping and laundry as needed. Patient's daughter declines further needs at this time. Patient's daughter provided with MSW contact information and will follow-up with MSW as needed for additional community resources.     Radhika DIAZ -   Ambulatory Case Management    12/8/2023, 10:51 EST

## 2023-12-08 NOTE — TELEPHONE ENCOUNTER
Spoke with pts daughter and let her know that Brooke reviewed his emergency department records and appears they did not find anything. Although reassuring doesn't explain his pain. I know they sent him on something to help with the pain. Hopefully he is feeling better. If this continues he should see the primary care doctor for further evaluation.

## 2023-12-14 ENCOUNTER — PATIENT OUTREACH (OUTPATIENT)
Dept: CASE MANAGEMENT | Facility: OTHER | Age: 78
End: 2023-12-14
Payer: MEDICARE

## 2023-12-14 DIAGNOSIS — E11.9 TYPE 2 DIABETES MELLITUS WITHOUT COMPLICATION, WITHOUT LONG-TERM CURRENT USE OF INSULIN: Chronic | ICD-10-CM

## 2023-12-14 DIAGNOSIS — E78.2 MIXED HYPERLIPIDEMIA: Chronic | ICD-10-CM

## 2023-12-14 DIAGNOSIS — I10 PRIMARY HYPERTENSION: Primary | Chronic | ICD-10-CM

## 2023-12-14 DIAGNOSIS — I25.118 CORONARY ARTERY DISEASE OF NATIVE ARTERY OF NATIVE HEART WITH STABLE ANGINA PECTORIS: Chronic | ICD-10-CM

## 2023-12-14 PROBLEM — R10.30 GROIN PAIN: Status: ACTIVE | Noted: 2023-12-14

## 2023-12-14 NOTE — PROGRESS NOTES
[facility] [place]  [facility] CARDIOLOGY  [address] [place] [address]  Dept: [phone]  Dept Fax: [phone]    Kerrie Medrano  1945    Follow Up Office Visit Note    History of Present Illness:  Kerrie Medrano is a 78 y.o. male who presents to the clinic for Follow up PAF/flutter, HF. Last visit was found to be in flutter after successful ECV, sotalol was increased to 120 bid, on Eliquis. Also added Lasix for continued SOB, also on aldctone, Entresto, known HF however SONIA revealed EF 50%. After review of historical notes he has severe SALIMA without full compliance CPAP, also asthma/COPD overlap per pulmonology and ENT note supplemental oxygen was recommended especially during ambulation. He went to ED for groin pain following last visit and had normal workup, He had CTA abdominal aorta with runoff, arteries with normal flow, did not reveal hematoma, soft tissue swelling, dissection or aneurysm. Will check labs. Needs to follow with pulmonology and ENT. Today he states there has been no improvement in his breathing despite adding Lasix, also on aldactone, Entresto. Likely his breathing problem is residing in lungs given significant COPD, SALIMA and he had 6 minute walk test today, he did walk 800 feet and felt very short of breath, however oxygen somewhat stable down to 88%. He is supposed to have portable oxygen but he is not currently using. At this time we discussed further management, he would like to see if his heart will go back to rhythym, today rate hs improved to 72, however still atrial flutter, [place] [facility] Advised if he does not sustain NSR, then we will likely just have to do rate control or can discuss alternative options. Will continue sotalol 120 bid. At this time without improvement in breathing will stop Aldactone and continue Lasix, Entresto, as the Ef by SONIA was 50%. Labs today to re-evaluate K+ as was high last check 5.5. Will  further discuss with patients daughter lulu.     The following portions of the patient's history were reviewed and updated as appropriate: allergies, current medications, past family history, past medical history, past social history, past surgical history, and problem list.    Medications:  apixaban tablet  atorvastatin  busPIRone  Entresto tablet  ezetimibe  Fluticasone-Umeclidin-Vilant  furosemide  metFORMIN ER  montelukast  pantoprazole  sertraline  sotalol  spironolactone  Vitamin D3    Subjective  No Known Allergies     Past Medical History:   Diagnosis Date   • Benign essential hypertension    • Disorder of hair and hair follicles    • Joint pain        Past Surgical History:   Procedure Laterality Date   • APPENDECTOMY  1960   • CARDIAC CATHETERIZATION      11/29/2023 PER DR. ARENAS   • CARDIAC CATHETERIZATION Left 11/29/2023    Procedure: Cardiac Catheterization/Vascular Study;  Surgeon: Glen Arenas MD;  Location: [facility] INVASIVE LOCATION;  Service: Cardiovascular;  Laterality: Left;   • COLONOSCOPY  04/2009    COLON POLYP-20 CM INFLAMMATORY POLYP, PLAN: REPEAT IN 5 YEARS, NO FH OF COLON CANCER   • KNEE SURGERY      ARTHROCENTESIS OF THE RIGHT KNEE MEDIAL JOINT LINE  AND LEFT KNEE   • SKIN CANCER EXCISION         Family History   Problem Relation Age of Onset   • Heart attack Mother    • Heart attack Father    • Skin cancer Brother 50   • Heart attack Brother         X3        Social History     Socioeconomic History   • Marital status:    Tobacco Use   • Smoking status: Never     Passive exposure: Never   • Smokeless tobacco: Never   Vaping Use   • Vaping Use: Never used   Substance and Sexual Activity   • Alcohol use: Never   • Drug use: Never   • Sexual activity: Defer       Review of Systems   Respiratory:  Positive for shortness of breath.    All other systems reviewed and are negative.      Cardiovascular Procedures    ECHO/MUGA:   STRESS TESTS:   CARDIAC CATH:   DEVICES:   HOLTER:    CT/MRI:   VASCULAR:   CARDIOTHORACIC:   proBNP   Date Value Ref Range Status   10/11/2023 640 (H) 0 - 486 pg/mL Final     Comment:     The following cut-points have been suggested for the  use of proBNP for the diagnostic evaluation of heart  failure (HF) in patients with acute dyspnea:  Modality                     Age           Optimal Cut                             (years)            Point  ------------------------------------------------------  Diagnosis (rule in HF)        <50            450 pg/mL                            50 - 75            900 pg/mL                                >75           1800 pg/mL  Exclusion (rule out HF)  Age independent     300 pg/mL       CMP          11/15/2023    14:47 11/29/2023    09:19 11/29/2023    09:26 12/5/2023    14:22   CMP   Glucose 98  137   136    BUN 14  16   20    Creatinine 1.00  1.07  1.00  1.22    EGFR  71.5  77.5     Sodium 141  141   145    Potassium 4.5  4.4   5.5    Chloride 104  104   102    Calcium 9.5  9.3   10.0    BUN/Creatinine Ratio 14  15.0   16    Anion Gap  13.0        CBC w/diff          10/10/2023    14:39 10/11/2023    14:47 11/29/2023    09:19 11/29/2023    09:26   CBC w/Diff   WBC 6.5  7.1  8.84     RBC 5.50  5.55  5.47     Hemoglobin 16.2  16.3  16.6  17.3    Hematocrit 48.7  49.9  50.9  51    MCV 89  90  93.1     MCH 29.5  29.4  30.3     MCHC 33.3  32.7  32.6     RDW 13.2  13.5  14.3     Platelets 142  145  126     Neutrophil Rel % 57  59      Lymphocyte Rel % 28  27      Monocyte Rel % 9  8      Eosinophil Rel % 5  5      Basophil Rel % 1  1         Lipid Panel          7/3/2023    11:18 10/10/2023    14:39   Lipid Panel   Total Cholesterol 245  188    Triglycerides 267  114    HDL Cholesterol 37  49    VLDL Cholesterol 50  21    LDL Cholesterol  158  118       Lab Results   Component Value Date    TSH 3.270 10/11/2023    TSH 3.930 10/10/2023    TSH 3.580 07/03/2023      Lab Results   Component Value Date    FREET4 1.10 10/10/2023     "FREET4 0.94 07/03/2023      Magnesium   Date Value Ref Range Status   10/10/2023 2.2 1.6 - 2.3 mg/dL Final      Troponin T   Date Value Ref Range Status   10/11/2023 14 0 - 22 ng/L Final     Comment:     In order to distinguish acute elevations of high sensitive  Troponin from other clinical conditions, the Universal  Definition of myocardial infarction stresses clinical  assessment and the need for serial measurements to observe  a rise and/or fall above the upper limit of the reference  interval.        Hemoglobin A1C   Date Value Ref Range Status   10/10/2023 7.0 (H) 4.8 - 5.6 % Final     Comment:              Prediabetes: 5.7 - 6.4           Diabetes: >6.4           Glycemic control for adults with diabetes: <7.0     No results found for: \"INR\", \"PROTIME\"      Last Echo  Results for orders placed in visit on 10/25/23    Adult Transthoracic Echo Complete W/ Cont if Necessary Per Protocol    Interpretation Summary  •  Left ventricular diastolic function was not assessed.  •  The left atrial cavity is moderately dilated.  •  The right atrial cavity is dilated.  •  Estimated right ventricular systolic pressure from tricuspid regurgitation is normal (<35 mmHg).  •  The aortic root measures 3.1 cm.  •  This is  very Limited examination poor windows  •  Grossly the LV might be normal  •  Both atria seems likely enalrged  •  Rv could not be seens well  •  Valve could not be evaluated  •  No pericardial effusion       Last Stress  Results for orders placed in visit on 10/25/23    Stress Test With Myocardial Perfusion One Day    Interpretation Summary  •  Left ventricular ejection fraction is borderline normal (Calculated EF = 50%). and dropps to 40% at stress  •  Myocardial perfusion imaging indicates a moderate-sized, moderate-to-severe area of ischemia located in the apex.  •  Impressions are consistent with a high risk study.  •  Findings consistent with an indeterminate ECG stress test.       Last Cardiac " Monitor      Last Cath  Results for orders placed during the hospital encounter of 11/29/23    Cardiac Catheterization/Vascular Study    Narrative  •  Minor nonobstructive CAD, mid LAD diffuse 20%, dominant circumflex with a mid LPDA 40-50% lesion, normal nondominant small RCA  •  LV pressures (S/D/E) : 134/-1/7 mmHg        Objective  There were no vitals filed for this visit.  There is no height or weight on file to calculate BMI.     Physical Exam  Vitals reviewed.   Constitutional:       General: Awake.      Appearance: Normal appearance. Overweight and not in distress.   Neck:      Vascular: No JVR. JVD normal.   Pulmonary:      Effort: Pulmonary effort is normal.      Breath sounds: Normal breath sounds. No wheezing. No rhonchi. No rales.   Chest:      Chest wall: Not tender to palpatation.   Cardiovascular:      PMI at left midclavicular line. Normal rate. Irregularly irregular rhythm. Normal S1. Normal S2.       Murmurs: There is no murmur.      No gallop.  No click. No rub.   Pulses:     Intact distal pulses.   Edema:     Pretibial: bilateral trace edema of the pretibial area.     Ankle: bilateral trace edema of the ankle.  Abdominal:      General: Bowel sounds are normal.      Palpations: Abdomen is soft.      Tenderness: There is no abdominal tenderness.   Musculoskeletal: Normal range of motion.         General: No tenderness. Skin:     General: Skin is warm and dry.   Neurological:      General: No focal deficit present.      Mental Status: Alert and oriented to person, place and time.   Psychiatric:         Behavior: Behavior is cooperative.        Diagnostic Data    ECG 12 Lead    Date/Time: 12/15/2023 2:46 PM  Performed by: Brooke Jernigan APRN    Authorized by: Brooke Jernigan APRN  Comparison: compared with previous ECG from 12/5/2023  Similar to previous ECG  Rhythm: atrial flutter  Rate: normal  BPM: 72  Conduction: right bundle branch block  QRS axis: left    Clinical  impression: abnormal EKG      Advance Care Planning          Assessment and Plan  Diagnoses and all orders for this visit:    1. Right inguinal pain (Primary)  Following last visit by phone call, he states feels better today. He went to ED and had normal workup with CTA abdominal aorta with runoff which revealed normal arteries in extremities, no hematoma, soft tissue swelling, dissection or aneurysm noted. He state it is better today. Should further discuss with PCP.     2. Atrial fibrillation with RVR  Still flutter today with improved rate despite further increase in Sotalol to 120 bid last visit, on Eliquis 5 bid, no bleeding. Chads vasc 6. EKG today rate 72, RBBB, prolongation QT. He had ECV successful 11/8/23. He would like to see if he can get back into rhythm and is agreeable for cardioversion again. Advised if he does not sustain normal rhythm following then we will focus on rate control or other measures.     3. Acute HFrEF (heart failure with reduced ejection fraction)  Ef 50% by SONIA on Entresto 49/51 bid, Aldactone 25 qd and Lasix 40 qd, BP today is low 90/60. Will stop Aldactone given recent K+ elevated. Continue all other medications.     4. Coronary artery disease of native artery of native heart with stable angina pectoris  Mild-Mod disease by cath 11/29/23, LAD 20%, Cx mid LPDA 40-50%, on statin. Continue medical management with risk factor reduction. He is non smoker. DM with A1C 7.0.  Continue plan.     5. Dyspnea on exertion  Chronic, no improvement even despite return to SR historically and no improvement with use of multiple diuretics. He has known moderate asthma/COPD overlap, and also moderate SALIMA, he is not using oxygen as prescribed, 6 minute walk today walked 800 feet with Oxygen around 88%, cath not significant obstructive disease. On Aldactone, Lasix. Will hold aldactone and check labs today.     6. Mixed hyperlipidemia  On Lipitor 80, zetia, LDL was high 158 October 2023. Will plan for  recheck next visit.     7. Primary hypertension  BP is low 90/60, on Aldactone, Lasix, Entresto. Will stop Aldactone.     8. RBBB    9. Type 2 diabetes mellitus without complication, without long-term current use of insulin    10. Pre-operative cardiovascular examination  Still waiting on approval for knee replacement. He had normal cath, on Eliquis. At this time will hold off following ECV.        No follow-ups on file.    Brooke Jernigan, APRN  12/15/2023    Part of this note may be an electronic transcription/translation of spoken language to printed text using the Dragon Dictation System.

## 2023-12-15 ENCOUNTER — TELEPHONE (OUTPATIENT)
Dept: CARDIOLOGY | Facility: CLINIC | Age: 78
End: 2023-12-15

## 2023-12-15 ENCOUNTER — PATIENT OUTREACH (OUTPATIENT)
Dept: CASE MANAGEMENT | Facility: OTHER | Age: 78
End: 2023-12-15
Payer: MEDICARE

## 2023-12-15 ENCOUNTER — OFFICE VISIT (OUTPATIENT)
Dept: CARDIOLOGY | Facility: CLINIC | Age: 78
End: 2023-12-15
Payer: MEDICARE

## 2023-12-15 VITALS
HEART RATE: 90 BPM | WEIGHT: 284 LBS | BODY MASS INDEX: 40.66 KG/M2 | HEIGHT: 70 IN | RESPIRATION RATE: 18 BRPM | SYSTOLIC BLOOD PRESSURE: 98 MMHG | DIASTOLIC BLOOD PRESSURE: 62 MMHG | OXYGEN SATURATION: 93 %

## 2023-12-15 DIAGNOSIS — Z01.810 PRE-OPERATIVE CARDIOVASCULAR EXAMINATION: ICD-10-CM

## 2023-12-15 DIAGNOSIS — I10 PRIMARY HYPERTENSION: ICD-10-CM

## 2023-12-15 DIAGNOSIS — I50.21 ACUTE HFREF (HEART FAILURE WITH REDUCED EJECTION FRACTION): ICD-10-CM

## 2023-12-15 DIAGNOSIS — I25.118 CORONARY ARTERY DISEASE OF NATIVE ARTERY OF NATIVE HEART WITH STABLE ANGINA PECTORIS: ICD-10-CM

## 2023-12-15 DIAGNOSIS — I45.10 RBBB: ICD-10-CM

## 2023-12-15 DIAGNOSIS — I10 PRIMARY HYPERTENSION: Primary | ICD-10-CM

## 2023-12-15 DIAGNOSIS — E11.9 TYPE 2 DIABETES MELLITUS WITHOUT COMPLICATION, WITHOUT LONG-TERM CURRENT USE OF INSULIN: ICD-10-CM

## 2023-12-15 DIAGNOSIS — I48.91 ATRIAL FIBRILLATION WITH RVR: ICD-10-CM

## 2023-12-15 DIAGNOSIS — E87.5 HYPERKALEMIA: ICD-10-CM

## 2023-12-15 DIAGNOSIS — E78.2 MIXED HYPERLIPIDEMIA: ICD-10-CM

## 2023-12-15 DIAGNOSIS — R06.09 DYSPNEA ON EXERTION: ICD-10-CM

## 2023-12-15 DIAGNOSIS — R10.31 RIGHT INGUINAL PAIN: Primary | ICD-10-CM

## 2023-12-15 NOTE — OUTREACH NOTE
AMBULATORY CASE MANAGEMENT NOTE    Name and Relationship of Patient/Support Person: ZULMA SELF - Emergency Contact    Adult Patient Profile  Questions/Answers      Flowsheet Row Most Recent Value   Symptoms/Conditions Managed at Home diabetes, type 2, cardiovascular, respiratory   Barriers to Managing Health literacy, understanding health advice   Cardiovascular Symptoms/Conditions dysrhythmia   Cardiovascular Management Strategies activity, coping strategies, medical device, medication therapy, routine screening, weight management   Diabetes Management Strategies activity, blood glucose testing, coping strategies, diet modification, exercise, medication therapy, routine screenings, weight management   Last A1C Result 7.0   Respiratory Symptoms/Conditions COPD   Respiratory Management Strategies activity, adequate rest, breathing techniques, breathing exercise, CPAP, coping strategies, oxygen therapy, medication therapy, exercise, weight management, routine screening   Oxygen Therapy Device nasal cannula   Oxygen Therapy Times continuous   Walking or Climbing Stairs Difficulty no   Dressing/Bathing Difficulty no   Doing Errands Independently Difficulty (such as shopping) no   Equipment Currently Used at Home oxygen, cpap   Change in Functional Status Since Onset of Current Illness/Injury no   Pre-existing AND/MOST/POLST Order No   Advance Directive Status Patient does not have advance directive   Have you reviewed your Advance Directive and is it valid for this stay? Not applicable   Patient Requests Assistance on Advance Directives Other (Comment)  [Discussed with patient's daughter. Patient has been unwilling to complete.]   Fall Risk Category Moderate   Primary Source of Support/Comfort child(wilver)   Name of Support/Comfort Primary Source Zulma   People in Home alone   Family Caregiver if Needed child(wliver), adult   Current Living Arrangements home        SDOH updated and reviewed with the patient during this  program:  Financial Resource Strain: Low Risk  (12/15/2023)    Overall Financial Resource Strain (CARDIA)     Difficulty of Paying Living Expenses: Not very hard      Food Insecurity: No Food Insecurity (12/15/2023)    Hunger Vital Sign     Worried About Running Out of Food in the Last Year: Never true     Ran Out of Food in the Last Year: Never true      Transportation Needs: No Transportation Needs (12/15/2023)    PRAPARE - Transportation     Lack of Transportation (Medical): No     Lack of Transportation (Non-Medical): No      Housing Stability: Not At Risk (12/15/2023)    Housing Stability     Current Living Arrangements: home     Potentially Unsafe Housing Conditions: none     Patient Outreach    Spoke with patient's daughter Zulma. He does have a copay for visits. Monthly he only has $150 left after paying bills for medicine and food. He doesn't have extra money for additional copays. Declined CCM but is open to assistance from [facility].     Patient lives at home alone. His daughter Zulma assists him. He does not have advanced directives. She has approached him about advanced directives but he has declined. Offered assistance if patient is willing.     Zulma states that he brings in a little over $700 social security and has his wife's pension that is a little less than $1200. He will only have that pension for a couple more years. 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 He has lived there since 1989. He lives there alone. She is unaware of any falls.     He has o2 and is supposed to wear continuously. She states that he often does not take his portables with him. He tells her that he is compliant with his cpap.     There are concerns with patient's medications. It sounds like he is potentially in the doughnut hole. The last time his inhaler was filled it was significantly higher. His eliquis is usually $40 to fill. He does currently have samples. Informed Zulma that when  it is time to re enroll for medicare to make sure that he chooses a plan with the most coverage for medications. It may be more a month but in the end is the better coverage for meds. Told her about ship through the state that is there to advise people on the insurance plans. She was very appreciative of this information.     There are also concerns about patient's literacy. Zulma had to leave the conversation for work but will follow up regarding this.     Education Documentation  No documentation found.        Xochitl SHAW  Ambulatory Case Management    12/15/2023, 10:05 EST

## 2023-12-15 NOTE — TELEPHONE ENCOUNTER
Spoke with daughter, Zulma, and advised her that Brooke stated her fathers bp was on the low side today and she stopped the aldactone.  He is still taking Lasix fluid pill.  She verbalized understanding.  She is still working on the financial paperwork for medication assistance.      I advised I would speak with Dr. Dutta on Tuesday to see when he can do the cardioversion and they would like to wait until Brooke is back to do it.

## 2023-12-16 LAB
BUN SERPL-MCNC: 12 MG/DL (ref 8–27)
BUN/CREAT SERPL: 10 (ref 10–24)
CALCIUM SERPL-MCNC: 9.3 MG/DL (ref 8.6–10.2)
CHLORIDE SERPL-SCNC: 98 MMOL/L (ref 96–106)
CO2 SERPL-SCNC: 22 MMOL/L (ref 20–29)
CREAT SERPL-MCNC: 1.16 MG/DL (ref 0.76–1.27)
EGFRCR SERPLBLD CKD-EPI 2021: 65 ML/MIN/1.73
GLUCOSE SERPL-MCNC: 136 MG/DL (ref 70–99)
POTASSIUM SERPL-SCNC: 3.9 MMOL/L (ref 3.5–5.2)
SODIUM SERPL-SCNC: 140 MMOL/L (ref 134–144)

## 2023-12-20 ENCOUNTER — TELEPHONE (OUTPATIENT)
Dept: CARDIOLOGY | Facility: CLINIC | Age: 78
End: 2023-12-20
Payer: MEDICARE

## 2023-12-22 ENCOUNTER — TELEPHONE (OUTPATIENT)
Dept: CARDIOLOGY | Facility: CLINIC | Age: 78
End: 2023-12-22
Payer: MEDICARE

## 2023-12-22 NOTE — TELEPHONE ENCOUNTER
Caller: SIRI SELF    Relationship to patient: Emergency Contact    Best call back number: [phone]    Patient is needing: PATIENT'S DAUGHTER HAS HIS PROOF OF INCOME STATEMENTS FOR PATIENT ASSISTANCE AND WOULD LIKE TO KNOW WHERE SHE IS SUPPOSED TO SEND THEM.

## 2023-12-28 ENCOUNTER — TELEPHONE (OUTPATIENT)
Dept: CARDIOLOGY | Facility: CLINIC | Age: 78
End: 2023-12-28
Payer: MEDICARE

## 2023-12-28 NOTE — TELEPHONE ENCOUNTER
Spoke with daughter and she is good with moving the cardioversion to 1/15/24 at noon so Brooke will be available to be there as well.

## 2024-01-04 ENCOUNTER — TELEPHONE (OUTPATIENT)
Dept: CARDIOLOGY | Facility: CLINIC | Age: 79
End: 2024-01-04
Payer: MEDICARE

## 2024-01-04 ENCOUNTER — CLINICAL SUPPORT (OUTPATIENT)
Dept: CARDIOLOGY | Facility: CLINIC | Age: 79
End: 2024-01-04
Payer: MEDICARE

## 2024-01-04 ENCOUNTER — OFFICE VISIT (OUTPATIENT)
Dept: CARDIOLOGY | Facility: CLINIC | Age: 79
End: 2024-01-04
Payer: MEDICARE

## 2024-01-04 VITALS
OXYGEN SATURATION: 93 % | DIASTOLIC BLOOD PRESSURE: 72 MMHG | RESPIRATION RATE: 18 BRPM | HEART RATE: 90 BPM | SYSTOLIC BLOOD PRESSURE: 100 MMHG

## 2024-01-04 VITALS
HEART RATE: 90 BPM | OXYGEN SATURATION: 93 % | BODY MASS INDEX: 40.37 KG/M2 | HEIGHT: 70 IN | SYSTOLIC BLOOD PRESSURE: 100 MMHG | DIASTOLIC BLOOD PRESSURE: 72 MMHG | WEIGHT: 282 LBS

## 2024-01-04 DIAGNOSIS — I48.91 ATRIAL FIBRILLATION WITH RVR: Primary | ICD-10-CM

## 2024-01-04 DIAGNOSIS — I25.118 CORONARY ARTERY DISEASE OF NATIVE ARTERY OF NATIVE HEART WITH STABLE ANGINA PECTORIS: ICD-10-CM

## 2024-01-04 DIAGNOSIS — I50.21 ACUTE HFREF (HEART FAILURE WITH REDUCED EJECTION FRACTION): ICD-10-CM

## 2024-01-04 DIAGNOSIS — I45.10 RBBB: ICD-10-CM

## 2024-01-04 DIAGNOSIS — R06.09 DYSPNEA ON EXERTION: ICD-10-CM

## 2024-01-04 DIAGNOSIS — E78.2 MIXED HYPERLIPIDEMIA: ICD-10-CM

## 2024-01-04 DIAGNOSIS — E11.9 TYPE 2 DIABETES MELLITUS WITHOUT COMPLICATION, WITHOUT LONG-TERM CURRENT USE OF INSULIN: ICD-10-CM

## 2024-01-04 DIAGNOSIS — I10 PRIMARY HYPERTENSION: ICD-10-CM

## 2024-01-04 NOTE — PROGRESS NOTES
[facility] [place]  [facility] CARDIOLOGY  [address] [place] [address]  Dept: [phone]  Dept Fax: [phone]    Kerrie Medrano  1945    Follow Up Office Visit Note    History of Present Illness:  Kerrie Medrano is a 78 y.o. male who presents to the clinic for Follow-up. He stopped in office today with complaints of chest pain which he stated he noticed on Tuesday of this week, sharp mid sternal across chest while he was watching tv, states it traveled to left arm and hand was numb, felt dizzy, SOB and lasted about 30 minutes then spontaneously resolved. No speech deficit, no bilateral weakness, no vision change. He is still chronically short of breath otherwise no major changes since last visit. He had cath recently 11/29/23 only mild to moderate CAD at that time. EKG still atrial fibrillation today rate 95 on Sotalol 120 and Eliquis. He is scheduled for ECV on January 15th [facility]. Exam today otherwise normal. At this time he did complain of frequent urination and he would like to stop water pills. Advised to continue but at lower dose given his heart failure, will lower Lasix to 20 mg daily. Continue all other medications at this time and will observe his complaints.     The following portions of the patient's history were reviewed and updated as appropriate: allergies, current medications, past family history, past medical history, past social history, past surgical history, and problem list.    Medications:  apixaban tablet  atorvastatin  busPIRone  Entresto tablet  ezetimibe  Fluticasone-Umeclidin-Vilant  furosemide  metFORMIN ER  montelukast  pantoprazole  sertraline  sotalol  Vitamin D3    Subjective  No Known Allergies     Past Medical History:   Diagnosis Date   • Benign essential hypertension    • Disorder of hair and hair follicles    • Joint pain        Past Surgical History:   Procedure Laterality Date   • APPENDECTOMY  1960   • CARDIAC CATHETERIZATION      11/29/2023  "PER DR. ARENAS   • CARDIAC CATHETERIZATION Left 11/29/2023    Procedure: Cardiac Catheterization/Vascular Study;  Surgeon: Glen Arenas MD;  Location: [facility] INVASIVE LOCATION;  Service: Cardiovascular;  Laterality: Left;   • COLONOSCOPY  04/2009    COLON POLYP-20 CM INFLAMMATORY POLYP, PLAN: REPEAT IN 5 YEARS, NO FH OF COLON CANCER   • KNEE SURGERY      ARTHROCENTESIS OF THE RIGHT KNEE MEDIAL JOINT LINE  AND LEFT KNEE   • SKIN CANCER EXCISION         Family History   Problem Relation Age of Onset   • Heart attack Mother    • Heart attack Father    • Skin cancer Brother 50   • Heart attack Brother         X3        Social History     Socioeconomic History   • Marital status:    Tobacco Use   • Smoking status: Never     Passive exposure: Never   • Smokeless tobacco: Never   Vaping Use   • Vaping Use: Never used   Substance and Sexual Activity   • Alcohol use: Never   • Drug use: Never   • Sexual activity: Defer       Review of Systems   Respiratory:  Positive for shortness of breath.    Cardiovascular:  Positive for chest pain.   All other systems reviewed and are negative.      Cardiovascular Procedures    ECHO/MUGA:   STRESS TESTS:   CARDIAC CATH:   DEVICES:   HOLTER:   CT/MRI:   VASCULAR:   CARDIOTHORACIC:     Objective  Vitals:    01/04/24 1456   BP: 100/72   BP Location: Left arm   Patient Position: Sitting   Cuff Size: Large Adult   Pulse: 90   SpO2: 93%   Weight: 128 kg (282 lb)   Height: 177.8 cm (70\")   PainSc: 0-No pain     Body mass index is 40.46 kg/m².     Physical Exam  Vitals reviewed.   Constitutional:       General: Awake.      Appearance: Normal and healthy appearance. Not in distress.   Neck:      Vascular: No JVR. JVD normal.   Pulmonary:      Effort: Pulmonary effort is normal.      Breath sounds: Normal breath sounds. No wheezing. No rhonchi. No rales.   Chest:      Chest wall: Not tender to palpatation.   Cardiovascular:      PMI at left midclavicular line. Normal rate. " Irregularly irregular rhythm. Normal S1. Normal S2.       Murmurs: There is no murmur.      No gallop.  No click. No rub.   Pulses:     Intact distal pulses.   Edema:     Pretibial: bilateral trace edema of the pretibial area.     Ankle: bilateral trace edema of the ankle.  Abdominal:      General: Bowel sounds are normal.      Palpations: Abdomen is soft.      Tenderness: There is no abdominal tenderness.   Musculoskeletal: Normal range of motion.         General: No tenderness. Skin:     General: Skin is warm and dry.   Neurological:      General: No focal deficit present.      Mental Status: Alert and oriented to person, place and time.   Psychiatric:         Behavior: Behavior is cooperative.        Diagnostic Data    ECG 12 Lead    Date/Time: 1/4/2024 4:05 PM  Performed by: Brooke Jernigan APRN    Authorized by: Brooke Jernigan APRN  Comparison: compared with previous ECG from 12/5/2023  Similar to previous ECG  Rhythm: atrial fibrillation  Rate: normal  BPM: 91  QRS axis: right    Clinical impression: abnormal EKG      Advance Care Planning          Assessment and Plan  Diagnoses and all orders for this visit:    1. Atrial fibrillation with RVR (Primary)  Rate today 95, still a fib by EKG. On Eliquis 5 bid and Sotalol 120 bid. Chads vasc 2. He is scheduled for ECV on January 15th. Continue plan.     2. Acute HFrEF (heart failure with reduced ejection fraction)  50% by re-evaluation SONIA recently. On Entresto 49/51 bid, Lasix 40, stopped Aldactone last visit. Will lower Lasix to 20 given his c/o frequent urination and inability to hold urine/leakage. Continue all other medications.      3. Coronary artery disease of native artery of native heart with stable angina pectoris  Mild-Mod disease by cath 11/29/23, LAD 20%, Cx mid LPDA 40-50%, on statin. Continue medical management with risk factor reduction. He is non smoker. DM with A1C 7.0.  He did have isolated episode CP as above. Not related to  CAD as no major obstructive disease. He is medically managed and anticoagulated. Continue plan and will observe.     4. Dyspnea on exertion  Chronic, no improvement even despite return to SR historically and no improvement with use of multiple diuretics. He has known moderate asthma/COPD overlap, and also moderate SALIMA, he is not using oxygen as prescribed, 6 minute walk today walked 800 feet with Oxygen around 88%, cath not significant obstructive disease. On Lasix. Stopped Aldactone, lower Lasix.      5. Mixed hyperlipidemia  On Lipitor 80, zetia, LDL was high 158 October 2023. Will plan for recheck next visit.      6. Primary hypertension  BP is low 90/60, on Aldactone, Lasix, Entresto. Will stop Aldactone.     7. RBBB    8. Type 2 diabetes mellitus without complication, without long-term current use of insulin         Return in about 1 month (around 2/4/2024) for Recheck, Brooke EDWARDS.    JERRY Lang  01/04/2024    Part of this note may be an electronic transcription/translation of spoken language to printed text using the Dragon Dictation System.

## 2024-01-08 ENCOUNTER — TELEPHONE (OUTPATIENT)
Dept: CARDIOLOGY | Facility: CLINIC | Age: 79
End: 2024-01-08

## 2024-01-08 DIAGNOSIS — I48.91 ATRIAL FIBRILLATION WITH RVR: ICD-10-CM

## 2024-01-08 NOTE — TELEPHONE ENCOUNTER
Caller: SIRI SELF    Relationship: Emergency Contact    Best call back number: [phone] CAN LVM     Requested Prescriptions:   Requested Prescriptions     Pending Prescriptions Disp Refills    apixaban (ELIQUIS) 5 MG tablet tablet 180 tablet 3     Sig: Take 1 tablet by mouth 2 (Two) Times a Day.        Pharmacy where request should be sent: [id number] - [place], [place] - [address]  [address] - [phone] [facility] [phone] FX     Last office visit with prescribing clinician: 1/4/2024   Last telemedicine visit with prescribing clinician: Visit date not found   Next office visit with prescribing clinician: 2/2/2024     Additional details provided by patient: PATIENT HAS LESS THAN 3    Does the patient have less than a 3 day supply:  [x] Yes  [] No    Would you like a call back once the refill request has been completed: [] Yes [x] No    If the office needs to give you a call back, can they leave a voicemail: [] Yes [x] No    Dez Patel Rep   01/08/24 08:28 EST

## 2024-01-08 NOTE — TELEPHONE ENCOUNTER
Caller: SIRI SELF    Relationship: Emergency Contact    Best call back number: [phone]     What is the best time to reach you: ANYTIME     Who are you requesting to speak with (clinical staff, provider,  specific staff member): CLINICAL     What was the call regarding: TRYING TO GET ELIQUIS SINCE 1/3/24. CALLED US ON 1/5 TO GET THIS SENT IN. STATES SHE JUST WENT TO THE PHARM AND THEY DON'T HAVE THIS RX, WAS TOLD THEY WERE WAITING ON THE DOCTOR TO APPROVE. CALLER ISN'T ON VALID BHVERB, NO INFO GIVEN ONLY TAKEN. LOOKS LIKE THIS WAS APPROVED. PLEASE CALL CALLER BACK AND ADVISE.         PT WILL BE OUT OF MEDS ON 1/9/24 OR 1/10/24

## 2024-01-09 ENCOUNTER — TELEPHONE (OUTPATIENT)
Dept: CARDIOLOGY | Facility: CLINIC | Age: 79
End: 2024-01-09
Payer: MEDICARE

## 2024-01-09 DIAGNOSIS — I48.91 ATRIAL FIBRILLATION WITH RVR: ICD-10-CM

## 2024-01-15 ENCOUNTER — OUTSIDE FACILITY SERVICE (OUTPATIENT)
Dept: CARDIOLOGY | Facility: CLINIC | Age: 79
End: 2024-01-15
Payer: MEDICARE

## 2024-01-15 ENCOUNTER — TELEPHONE (OUTPATIENT)
Dept: CARDIOLOGY | Facility: CLINIC | Age: 79
End: 2024-01-15

## 2024-01-15 DIAGNOSIS — R29.5 TRANSIENT PARALYSIS: ICD-10-CM

## 2024-01-15 DIAGNOSIS — I48.91 ATRIAL FIBRILLATION WITH RVR: Primary | ICD-10-CM

## 2024-01-15 NOTE — TELEPHONE ENCOUNTER
Successful ECV today to bradycardic rate around 55 with frequent ectopy PAC and PVC's. Daughter Zulma was present, states he had been taking all medications and no missed doses of Eliquis. Advised to follow in office 1 week. He has also had another episode of feeling left arm numbness, some chest pressure, 2 episodes total and denies additional complaints or symptoms at the time. Advised will order carotid duplex however uncertain about what may be causing these complaints as he had recent heart cath without obstructive disease. At this time continue plan and will likely need additional changes at follow up appointment.

## 2024-01-23 NOTE — PROGRESS NOTES
[facility] [place]  [facility] CARDIOLOGY  [address] [place] [address]  Dept: [phone]  Dept Fax: [phone]    Kerrie Medrano  1945    Follow Up Office Visit Note    History of Present Illness:  Kerrie Medrano is a 78 y.o. male who presents to the clinic for Follow up PAF. He is s/p successful ECV 1/15/24, on Sotalol and Eliquis. Today he remains SB, Hr 57 with some prolongation QT interval, RBBB, no major change from historical record. He did have recent ED visit on 1/22/24 for worsening SOB and intermittent arm numbness, normal workup with some electrolyte derangement, low K+ with repletion, thought to have COPD exacerbation and was sent home with steroids, also given potassium supplement at D/C. His diuretic was lowered at last visit Lasix 20 as he felt it was causing issues with urination, and he has gained weight so there is also concern for some fluid retention given his CXR finding. He had carotid duplex today for the arm numbness which showed nonobstructive bilateral mild plaques, he is on medical management. Today he is stable however some wheezes on examination. Given his abnormal CXR and complaints will get CTA chest, sending PRN rescue inhaler, also will increase Lasix back to 40, potassium repletion dosing pending labs today.      The following portions of the patient's history were reviewed and updated as appropriate: allergies, current medications, past family history, past medical history, past social history, past surgical history, and problem list.    Medications:  albuterol sulfate HFA  apixaban tablet  aspirin  atorvastatin  busPIRone  Entresto tablet  ezetimibe  Fluticasone-Umeclidin-Vilant  furosemide  metFORMIN ER  montelukast  pantoprazole  sertraline  sotalol  Vitamin D3    Subjective  No Known Allergies     Past Medical History:   Diagnosis Date   • Benign essential hypertension    • Disorder of hair and hair follicles    • Joint pain        Past  Surgical History:   Procedure Laterality Date   • APPENDECTOMY  1960   • CARDIAC CATHETERIZATION      11/29/2023 PER DR. ARENAS   • CARDIAC CATHETERIZATION Left 11/29/2023    Procedure: Cardiac Catheterization/Vascular Study;  Surgeon: Glen Arenas MD;  Location: [facility] INVASIVE LOCATION;  Service: Cardiovascular;  Laterality: Left;   • COLONOSCOPY  04/2009    COLON POLYP-20 CM INFLAMMATORY POLYP, PLAN: REPEAT IN 5 YEARS, NO FH OF COLON CANCER   • KNEE SURGERY      ARTHROCENTESIS OF THE RIGHT KNEE MEDIAL JOINT LINE  AND LEFT KNEE   • SKIN CANCER EXCISION         Family History   Problem Relation Age of Onset   • Heart attack Mother    • Heart attack Father    • Skin cancer Brother 50   • Heart attack Brother         X3        Social History     Socioeconomic History   • Marital status:    Tobacco Use   • Smoking status: Never     Passive exposure: Never   • Smokeless tobacco: Never   Vaping Use   • Vaping Use: Never used   Substance and Sexual Activity   • Alcohol use: Never   • Drug use: Never   • Sexual activity: Defer       Review of Systems   Respiratory:  Positive for chest tightness and shortness of breath.    All other systems reviewed and are negative.      Cardiovascular Procedures    ECHO/MUGA:   STRESS TESTS:   CARDIAC CATH:   DEVICES:   HOLTER:   CT/MRI:   VASCULAR:   CARDIOTHORACIC:   proBNP   Date Value Ref Range Status   10/11/2023 640 (H) 0 - 486 pg/mL Final     Comment:     The following cut-points have been suggested for the  use of proBNP for the diagnostic evaluation of heart  failure (HF) in patients with acute dyspnea:  Modality                     Age           Optimal Cut                             (years)            Point  ------------------------------------------------------  Diagnosis (rule in HF)        <50            450 pg/mL                            50 - 75            900 pg/mL                                >75           1800 pg/mL  Exclusion (rule out HF)  Age  independent     300 pg/mL       CMP          11/29/2023    09:19 11/29/2023    09:26 12/5/2023    14:22 12/15/2023    14:03   [facility]   Glucose 137   136  136    BUN 16   20  12    Creatinine 1.07  1.00  1.22  1.16    EGFR 71.5  77.5      Sodium 141   145  140    Potassium 4.4   5.5  3.9    Chloride 104   102  98    Calcium 9.3   10.0  9.3    BUN/Creatinine Ratio 15.0   16  10    Anion Gap 13.0         CBC w/diff          10/10/2023    14:39 10/11/2023    14:47 11/29/2023    09:19 11/29/2023    09:26   CBC w/Diff   WBC 6.5  7.1  8.84     RBC 5.50  5.55  5.47     Hemoglobin 16.2  16.3  16.6  17.3    Hematocrit 48.7  49.9  50.9  51    MCV 89  90  93.1     MCH 29.5  29.4  30.3     MCHC 33.3  32.7  32.6     RDW 13.2  13.5  14.3     Platelets 142  145  126     Neutrophil Rel % 57  59      Lymphocyte Rel % 28  27      Monocyte Rel % 9  8      Eosinophil Rel % 5  5      Basophil Rel % 1  1         Lipid Panel          7/3/2023    11:18 10/10/2023    14:39   Lipid Panel   Total Cholesterol 245  188    Triglycerides 267  114    HDL Cholesterol 37  49    VLDL Cholesterol 50  21    LDL Cholesterol  158  118       Lab Results   Component Value Date    TSH 3.270 10/11/2023    TSH 3.930 10/10/2023    TSH 3.580 07/03/2023      Lab Results   Component Value Date    FREET4 1.10 10/10/2023    FREET4 0.94 07/03/2023      Magnesium   Date Value Ref Range Status   10/10/2023 2.2 1.6 - 2.3 mg/dL Final      Troponin T   Date Value Ref Range Status   10/11/2023 14 0 - 22 ng/L Final     Comment:     In order to distinguish acute elevations of high sensitive  Troponin from other clinical conditions, the Universal  Definition of myocardial infarction stresses clinical  assessment and the need for serial measurements to observe  a rise and/or fall above the upper limit of the reference  interval.        Hemoglobin A1C   Date Value Ref Range Status   10/10/2023 7.0 (H) 4.8 - 5.6 % Final     Comment:              Prediabetes: 5.7 - 6.4            "Diabetes: >6.4           Glycemic control for adults with diabetes: <7.0     No results found for: \"INR\", \"PROTIME\"      Last Echo  Results for orders placed in visit on 10/25/23    Adult Transthoracic Echo Complete W/ Cont if Necessary Per Protocol    Interpretation Summary  •  Left ventricular diastolic function was not assessed.  •  The left atrial cavity is moderately dilated.  •  The right atrial cavity is dilated.  •  Estimated right ventricular systolic pressure from tricuspid regurgitation is normal (<35 mmHg).  •  The aortic root measures 3.1 cm.  •  This is  very Limited examination poor windows  •  Grossly the LV might be normal  •  Both atria seems likely enalrged  •  Rv could not be seens well  •  Valve could not be evaluated  •  No pericardial effusion       Last Stress  Results for orders placed in visit on 10/25/23    Stress Test With Myocardial Perfusion One Day    Interpretation Summary  •  Left ventricular ejection fraction is borderline normal (Calculated EF = 50%). and dropps to 40% at stress  •  Myocardial perfusion imaging indicates a moderate-sized, moderate-to-severe area of ischemia located in the apex.  •  Impressions are consistent with a high risk study.  •  Findings consistent with an indeterminate ECG stress test.       Last Cardiac Monitor      Last Cath  Results for orders placed during the hospital encounter of 11/29/23    Cardiac Catheterization/Vascular Study    Narrative  •  Minor nonobstructive CAD, mid LAD diffuse 20%, dominant circumflex with a mid LPDA 40-50% lesion, normal nondominant small RCA  •  LV pressures (S/D/E) : 134/-1/7 mmHg        Objective  Vitals:    01/24/24 1301   BP: 136/74   BP Location: Left arm   Patient Position: Sitting   Cuff Size: Large Adult   Pulse: 56   Resp: 18   SpO2: 93%   Weight: 131 kg (289 lb)   Height: 177.8 cm (70\")   PainSc: 0-No pain     Body mass index is 41.47 kg/m².     Physical Exam  Vitals reviewed.   Constitutional:       General: " Awake.      Appearance: Normal appearance. Not in distress.   Neck:      Vascular: No JVR. JVD normal.   Pulmonary:      Effort: Pulmonary effort is normal.      Breath sounds: Diffuse and bilateral Wheezing present. No rhonchi. No rales.   Chest:      Chest wall: Not tender to palpatation.   Cardiovascular:      PMI at left midclavicular line. Bradycardia present. Regular rhythm. Normal S1. Normal S2.       Murmurs: There is no murmur.      No gallop.  No click. No rub.   Pulses:     Intact distal pulses.   Edema:     Pretibial: bilateral trace edema of the pretibial area.     Ankle: bilateral trace edema of the ankle.  Abdominal:      General: Bowel sounds are normal.      Palpations: Abdomen is soft.      Tenderness: There is no abdominal tenderness.   Musculoskeletal:         General: No tenderness. Skin:     General: Skin is warm and dry.   Neurological:      General: No focal deficit present.      Mental Status: Alert and oriented to person, place and time.   Psychiatric:         Behavior: Behavior is cooperative.        Diagnostic Data    ECG 12 Lead    Date/Time: 1/24/2024 3:26 PM  Performed by: Brooke Jernigan APRN    Authorized by: rBooke Jernigan APRN  Comparison: compared with previous ECG from 1/4/2024  Comparison to previous ECG: Atrial fibrillation no longer present  Rhythm: sinus bradycardia  Rate: normal  BPM: 57  Conduction: right bundle branch block  QRS axis: left  Other findings: prolonged QTc interval    Clinical impression: abnormal EKG      Advance Care Planning          Assessment and Plan  Diagnoses and all orders for this visit:    1. Paroxysmal atrial fibrillation with rapid ventricular response (Primary)  Recent successful ECV, on sotalol 120 bid and eliquis 5 bid, EKG today SB Hr 57, prolongation QT, continue plan pending labs.     2. Acute HFrEF (heart failure with reduced ejection fraction)  50% by re-evaluation SONIA recently. On Entresto 49/51 bid, Lasix 20, stopped  Aldactone last visit given urinary complaints.  Will increase Lasix back to 40, check labs and likely potassium supplementation pending result.     3. Coronary artery disease of native artery of native heart with stable angina pectoris  Mild-Mod disease by cath 11/29/23, LAD 20%, Cx mid LPDA 40-50%, on statin. Continue medical management with risk factor reduction. He is non smoker. DM with A1C 7.0.  He did have isolated episode CP as above. Not related to CAD as no major obstructive disease. He is medically managed and anticoagulated. Continue plan and will observe.      4. Dyspnea on exertion  Chronic with recent worsening with ED visit as above. They felt was COPD exacerbation, possible also fluid retention. CTA chest today, increase lasix. He is on O2 but not wearing as prescribed for COPD asthma overlap. Will refer to pulmonology as Dr. Valdivia retired.   -     Cancel: CT Angiogram Chest  -     Ambulatory Referral to Pulmonology  -     CT Angiogram Chest    5. Mixed hyperlipidemia  On Lipitor 80, zetia, LDL was high 158 October 2023. Will plan for recheck next visit.      6. Primary hypertension  BP has improved, on Lasix, Entresto. BP today 126/84, continue plan.     7. RBBB    8. Type 2 diabetes mellitus without complication, without long-term current use of insulin    9. Hypokalemia  Recent 2.8, repletion at ED, also low calcium, magnesium. Labs today. He is on lasix, will need to renew potassium supplement.   -     Basic Metabolic Panel    10. Abnormal CXR  Recent ED visit 2 days ago. CXR showing dilation of pulmonary artery, lower bilateral lobe infiltrates, CTA chest today.   -     Cancel: CT Angiogram Chest  -     CT Angiogram Chest    11. Pulmonary emphysema, unspecified emphysema type  -     Ambulatory Referral to Pulmonology  -     albuterol sulfate  (90 Base) MCG/ACT inhaler; Inhale 2 puffs Every 4 (Four) Hours As Needed for Wheezing (emphysema).  Dispense: 8.5 g; Refill: 0    12.  Hypomagnesemia  labs  -     Magnesium         Return in about 3 weeks (around 2/14/2024) for Recheck, Brooke EDWARDS.    JERRY Lang  01/24/2024    Part of this note may be an electronic transcription/translation of spoken language to printed text using the Dragon Dictation System.

## 2024-01-24 ENCOUNTER — OFFICE VISIT (OUTPATIENT)
Dept: CARDIOLOGY | Facility: CLINIC | Age: 79
End: 2024-01-24
Payer: MEDICARE

## 2024-01-24 VITALS
HEIGHT: 70 IN | RESPIRATION RATE: 18 BRPM | SYSTOLIC BLOOD PRESSURE: 136 MMHG | WEIGHT: 289 LBS | DIASTOLIC BLOOD PRESSURE: 74 MMHG | OXYGEN SATURATION: 93 % | BODY MASS INDEX: 41.37 KG/M2 | HEART RATE: 56 BPM

## 2024-01-24 DIAGNOSIS — I45.10 RBBB: ICD-10-CM

## 2024-01-24 DIAGNOSIS — E87.6 HYPOKALEMIA: ICD-10-CM

## 2024-01-24 DIAGNOSIS — E11.9 TYPE 2 DIABETES MELLITUS WITHOUT COMPLICATION, WITHOUT LONG-TERM CURRENT USE OF INSULIN: ICD-10-CM

## 2024-01-24 DIAGNOSIS — E83.42 HYPOMAGNESEMIA: ICD-10-CM

## 2024-01-24 DIAGNOSIS — E78.2 MIXED HYPERLIPIDEMIA: ICD-10-CM

## 2024-01-24 DIAGNOSIS — R93.89 ABNORMAL CXR: ICD-10-CM

## 2024-01-24 DIAGNOSIS — J43.9 PULMONARY EMPHYSEMA, UNSPECIFIED EMPHYSEMA TYPE: ICD-10-CM

## 2024-01-24 DIAGNOSIS — I48.0 PAROXYSMAL ATRIAL FIBRILLATION WITH RAPID VENTRICULAR RESPONSE: Primary | ICD-10-CM

## 2024-01-24 DIAGNOSIS — I50.21 ACUTE HFREF (HEART FAILURE WITH REDUCED EJECTION FRACTION): ICD-10-CM

## 2024-01-24 DIAGNOSIS — I25.118 CORONARY ARTERY DISEASE OF NATIVE ARTERY OF NATIVE HEART WITH STABLE ANGINA PECTORIS: ICD-10-CM

## 2024-01-24 DIAGNOSIS — I10 PRIMARY HYPERTENSION: ICD-10-CM

## 2024-01-24 DIAGNOSIS — R06.09 DYSPNEA ON EXERTION: ICD-10-CM

## 2024-01-24 RX ORDER — ALBUTEROL SULFATE 90 UG/1
2 AEROSOL, METERED RESPIRATORY (INHALATION) EVERY 4 HOURS PRN
Qty: 8.5 G | Refills: 0 | Status: SHIPPED | OUTPATIENT
Start: 2024-01-24

## 2024-01-24 RX ORDER — ASPIRIN 81 MG/1
81 TABLET ORAL DAILY
COMMUNITY

## 2024-01-25 ENCOUNTER — TELEPHONE (OUTPATIENT)
Dept: CARDIOLOGY | Facility: CLINIC | Age: 79
End: 2024-01-25
Payer: MEDICARE

## 2024-01-25 DIAGNOSIS — I50.21 ACUTE HFREF (HEART FAILURE WITH REDUCED EJECTION FRACTION): ICD-10-CM

## 2024-01-25 LAB
BUN SERPL-MCNC: 16 MG/DL (ref 8–27)
BUN/CREAT SERPL: 20 (ref 10–24)
CALCIUM SERPL-MCNC: 9.2 MG/DL (ref 8.6–10.2)
CHLORIDE SERPL-SCNC: 102 MMOL/L (ref 96–106)
CO2 SERPL-SCNC: 25 MMOL/L (ref 20–29)
CREAT SERPL-MCNC: 0.8 MG/DL (ref 0.76–1.27)
EGFRCR SERPLBLD CKD-EPI 2021: 91 ML/MIN/1.73
GLUCOSE SERPL-MCNC: 159 MG/DL (ref 70–99)
MAGNESIUM SERPL-MCNC: 2.3 MG/DL (ref 1.6–2.3)
POTASSIUM SERPL-SCNC: 4.3 MMOL/L (ref 3.5–5.2)
SODIUM SERPL-SCNC: 143 MMOL/L (ref 134–144)

## 2024-01-25 RX ORDER — FUROSEMIDE 40 MG/1
40 TABLET ORAL 2 TIMES DAILY
Qty: 60 TABLET | Refills: 2 | Status: SHIPPED | OUTPATIENT
Start: 2024-01-25

## 2024-01-25 RX ORDER — POTASSIUM CHLORIDE 750 MG/1
20 TABLET, FILM COATED, EXTENDED RELEASE ORAL DAILY
Qty: 30 TABLET | Refills: 2 | Status: SHIPPED | OUTPATIENT
Start: 2024-01-25

## 2024-01-25 NOTE — TELEPHONE ENCOUNTER
----- Message from JERRY Lang sent at 1/25/2024  1:24 PM EST -----  Can you call and let his daughter Zulma know that his labs looked better than they did in the hospital. His electrolytes have normalized. his CT scan did show some fluid retention in the lungs otherwise normal.He is holding onto fluid, which explains his weight gain recently and likely explains his worsening shortness of breath since we had lowered his water pill. Given we had backed off of his fluid pills due to some urinary complaints, I think we need to increase these back. I want him to take Lasix 40 mg twice daily until I see him back, also I will send in a potassium supplement for him to take once daily.  Otherwise continue everything else until follow up.

## 2024-02-06 ENCOUNTER — PATIENT OUTREACH (OUTPATIENT)
Dept: CASE MANAGEMENT | Facility: OTHER | Age: 79
End: 2024-02-06
Payer: MEDICARE

## 2024-02-06 DIAGNOSIS — I10 PRIMARY HYPERTENSION: Primary | ICD-10-CM

## 2024-02-06 DIAGNOSIS — E11.9 TYPE 2 DIABETES MELLITUS WITHOUT COMPLICATION, WITHOUT LONG-TERM CURRENT USE OF INSULIN: ICD-10-CM

## 2024-02-06 DIAGNOSIS — I25.118 CORONARY ARTERY DISEASE OF NATIVE ARTERY OF NATIVE HEART WITH STABLE ANGINA PECTORIS: ICD-10-CM

## 2024-02-06 NOTE — OUTREACH NOTE
AMBULATORY CASE MANAGEMENT NOTE    Name and Relationship of Patient/Support Person: ARAMISZULMA - Emergency Contact    Patient Outreach    Spoke with patient's daughter Zulma for CCM update. She states that he has had diarrhea for the past couple of days. No known cause. She spoke with the pharmacist for recommendations of what to take. They recommended electrolyte replacement. She was concerned with him taking potassium to add electrolytes. Explained process of electrolyte loss during diarhhea and to alternate with water or dilute.    She believes he it taking his medication as directed. She sees the medicine going down in the bottle. She has not heard back yet from the assistance program for his eliquis. The last time he had a $50 copay for a 90 day supply.     She has noticed that he is using his o2 more even when he leaves the home. He is using 2L.     Education Documentation  No documentation found.        Xochitl SHAW  Ambulatory Case Management    2/6/2024, 09:57 EST

## 2024-02-15 ENCOUNTER — OFFICE VISIT (OUTPATIENT)
Dept: CARDIOLOGY | Facility: CLINIC | Age: 79
End: 2024-02-15
Payer: MEDICARE

## 2024-02-15 VITALS
WEIGHT: 275 LBS | HEART RATE: 52 BPM | SYSTOLIC BLOOD PRESSURE: 90 MMHG | RESPIRATION RATE: 18 BRPM | HEIGHT: 70 IN | DIASTOLIC BLOOD PRESSURE: 60 MMHG | OXYGEN SATURATION: 94 % | BODY MASS INDEX: 39.37 KG/M2

## 2024-02-15 DIAGNOSIS — I50.21 ACUTE HFREF (HEART FAILURE WITH REDUCED EJECTION FRACTION): ICD-10-CM

## 2024-02-15 DIAGNOSIS — R06.09 DYSPNEA ON EXERTION: ICD-10-CM

## 2024-02-15 DIAGNOSIS — I45.10 RBBB: ICD-10-CM

## 2024-02-15 DIAGNOSIS — E11.9 TYPE 2 DIABETES MELLITUS WITHOUT COMPLICATION, WITHOUT LONG-TERM CURRENT USE OF INSULIN: ICD-10-CM

## 2024-02-15 DIAGNOSIS — J43.9 PULMONARY EMPHYSEMA, UNSPECIFIED EMPHYSEMA TYPE: ICD-10-CM

## 2024-02-15 DIAGNOSIS — E78.2 MIXED HYPERLIPIDEMIA: ICD-10-CM

## 2024-02-15 DIAGNOSIS — I25.118 CORONARY ARTERY DISEASE OF NATIVE ARTERY OF NATIVE HEART WITH STABLE ANGINA PECTORIS: ICD-10-CM

## 2024-02-15 DIAGNOSIS — I10 PRIMARY HYPERTENSION: ICD-10-CM

## 2024-02-15 DIAGNOSIS — I48.0 PAROXYSMAL ATRIAL FIBRILLATION WITH RAPID VENTRICULAR RESPONSE: Primary | ICD-10-CM

## 2024-02-15 RX ORDER — POTASSIUM CHLORIDE 750 MG/1
20 TABLET, FILM COATED, EXTENDED RELEASE ORAL DAILY
Qty: 120 TABLET | Refills: 1 | Status: SHIPPED | OUTPATIENT
Start: 2024-02-15

## 2024-02-15 RX ORDER — SACUBITRIL AND VALSARTAN 49; 51 MG/1; MG/1
1 TABLET, FILM COATED ORAL 2 TIMES DAILY
Qty: 180 TABLET | Refills: 3 | Status: SHIPPED | OUTPATIENT
Start: 2024-02-15

## 2024-02-15 RX ORDER — ALBUTEROL SULFATE 90 UG/1
2 AEROSOL, METERED RESPIRATORY (INHALATION) EVERY 4 HOURS PRN
Qty: 8.5 G | Refills: 0 | Status: SHIPPED | OUTPATIENT
Start: 2024-02-15

## 2024-02-16 ENCOUNTER — TELEPHONE (OUTPATIENT)
Dept: CARDIOLOGY | Facility: CLINIC | Age: 79
End: 2024-02-16
Payer: MEDICARE

## 2024-02-16 LAB
BASOPHILS # BLD AUTO: 0.1 X10E3/UL (ref 0–0.2)
BASOPHILS NFR BLD AUTO: 1 %
BUN SERPL-MCNC: 13 MG/DL (ref 8–27)
BUN/CREAT SERPL: 12 (ref 10–24)
CALCIUM SERPL-MCNC: 9.6 MG/DL (ref 8.6–10.2)
CHLORIDE SERPL-SCNC: 99 MMOL/L (ref 96–106)
CO2 SERPL-SCNC: 28 MMOL/L (ref 20–29)
CREAT SERPL-MCNC: 1.07 MG/DL (ref 0.76–1.27)
EGFRCR SERPLBLD CKD-EPI 2021: 71 ML/MIN/1.73
EOSINOPHIL # BLD AUTO: 0.6 X10E3/UL (ref 0–0.4)
EOSINOPHIL NFR BLD AUTO: 8 %
ERYTHROCYTE [DISTWIDTH] IN BLOOD BY AUTOMATED COUNT: 13.2 % (ref 11.6–15.4)
GLUCOSE SERPL-MCNC: 162 MG/DL (ref 70–99)
HCT VFR BLD AUTO: 52.9 % (ref 37.5–51)
HGB BLD-MCNC: 17.6 G/DL (ref 13–17.7)
IMM GRANULOCYTES # BLD AUTO: 0 X10E3/UL (ref 0–0.1)
IMM GRANULOCYTES NFR BLD AUTO: 0 %
LYMPHOCYTES # BLD AUTO: 2.9 X10E3/UL (ref 0.7–3.1)
LYMPHOCYTES NFR BLD AUTO: 38 %
MCH RBC QN AUTO: 30.8 PG (ref 26.6–33)
MCHC RBC AUTO-ENTMCNC: 33.3 G/DL (ref 31.5–35.7)
MCV RBC AUTO: 93 FL (ref 79–97)
MONOCYTES # BLD AUTO: 0.7 X10E3/UL (ref 0.1–0.9)
MONOCYTES NFR BLD AUTO: 9 %
NEUTROPHILS # BLD AUTO: 3.5 X10E3/UL (ref 1.4–7)
NEUTROPHILS NFR BLD AUTO: 44 %
PLATELET # BLD AUTO: 171 X10E3/UL (ref 150–450)
POTASSIUM SERPL-SCNC: 4.7 MMOL/L (ref 3.5–5.2)
RBC # BLD AUTO: 5.72 X10E6/UL (ref 4.14–5.8)
SODIUM SERPL-SCNC: 141 MMOL/L (ref 134–144)
WBC # BLD AUTO: 7.7 X10E3/UL (ref 3.4–10.8)

## 2024-02-19 ENCOUNTER — TELEPHONE (OUTPATIENT)
Dept: CARDIOLOGY | Facility: CLINIC | Age: 79
End: 2024-02-19
Payer: MEDICARE

## 2024-02-19 NOTE — TELEPHONE ENCOUNTER
Spoke with Zulma, daughter, and advised that [employer] has approved the Entresto to come free to her dad until 12/31/2024.  I explained she had to call the foundation and order.  She verbalized understanding.  I have emailed her the approval and instructions.

## 2024-02-21 ENCOUNTER — TELEPHONE (OUTPATIENT)
Dept: CARDIOLOGY | Facility: CLINIC | Age: 79
End: 2024-02-21
Payer: MEDICARE

## 2024-02-21 NOTE — PROGRESS NOTES
Cardiac Electrophysiology Outpatient Note  [facility] at [facility]    Office Visit     Kerrie Medrano  [id number]  02/22/2024    Primary Care Physician: Jermaine Lopez MD    Referred By: Brooke Jernigan*    Subjective     Chief Complaint   Patient presents with    Paroxysmal atrial fibrillation with rapid ventricular respo     Problem List:  Persistent atrial fibrillation/flutter  Diagnosis 10/2023 incidental EKG finding atrial fibrillation versus atrial flutter with RVR, RBBB  S/p successful ECV 1/2024 on sotalol 120 twice daily with recurrence of atrial flutter within a month  HFrEF, suspected tachycardia induced  TTE 10/2023: Poor study no LVEF measurement  [facility] (Dr. Lira, 11/2023): Minor nonobstructive CAD, mid LAD diffuse 20%, dominant LCx with mid L PDA 40-50% lesion, normal nondominant small RCA.  LV pressures (S/D/B): 134/-1/7 mmHg  Nonobstructive CAD  Hypertension  Dyslipidemia  Chronic right bundle branch block  Type 2 diabetes  COPD awaiting pulmonology consultation  BMI 39.5        History of Present Illness:   Kerrie Medrano is a 78 y.o. male who presents to my electrophysiology clinic as a referral from Brooke EDWARDS for paroxysmal atrial fibrillation and atrial flutter.  He was first diagnosed with atrial fibrillation in October 2023.  He was unaware he was in atrial fibrillation it was difficult to know how long it was going on before then.  It is uncertain whether he has any symptoms related to the atrial fibrillation.  He has had 2 cardioversions, uncertain how long he remained in rhythm after those.  He thinks he did maybe feel little bit better with slightly less dyspnea after the cardioversions.  He does have COPD and is on continuous 3 L of oxygen.  Currently is taking sotalol 120 mg twice daily.  Was found to have a mild cardiomyopathy, and was started on GDMT.    Past Medical History:   Diagnosis Date    Benign essential hypertension      Disorder of hair and hair follicles     Joint pain        Past Surgical History:   Procedure Laterality Date    APPENDECTOMY  1960    CARDIAC CATHETERIZATION      11/29/2023 PER DR. ARENAS    CARDIAC CATHETERIZATION Left 11/29/2023    Procedure: Cardiac Catheterization/Vascular Study;  Surgeon: Glen Arenas MD;  Location: [facility] INVASIVE LOCATION;  Service: Cardiovascular;  Laterality: Left;    COLONOSCOPY  04/2009    COLON POLYP-20 CM INFLAMMATORY POLYP, PLAN: REPEAT IN 5 YEARS, NO FH OF COLON CANCER    KNEE SURGERY      ARTHROCENTESIS OF THE RIGHT KNEE MEDIAL JOINT LINE  AND LEFT KNEE    SKIN CANCER EXCISION         Family History   Problem Relation Age of Onset    Heart attack Mother     Heart attack Father     Skin cancer Brother 50    Heart attack Brother         X3       Social History     Socioeconomic History    Marital status:    Tobacco Use    Smoking status: Never     Passive exposure: Never    Smokeless tobacco: Current     Types: Chew   Vaping Use    Vaping Use: Never used   Substance and Sexual Activity    Alcohol use: Never    Drug use: Never    Sexual activity: Defer         Current Outpatient Medications:     albuterol sulfate  (90 Base) MCG/ACT inhaler, Inhale 2 puffs Every 4 (Four) Hours As Needed for Wheezing (emphysema)., Disp: 8.5 g, Rfl: 0    apixaban (ELIQUIS) 5 MG tablet tablet, Take 1 tablet by mouth 2 (Two) Times a Day., Disp: 180 tablet, Rfl: 3    aspirin 81 MG EC tablet, Take 1 tablet by mouth Daily., Disp: , Rfl:     atorvastatin (LIPITOR) 80 MG tablet, Take 1 tab po daily for cholesterol, Disp: 90 tablet, Rfl: 2    busPIRone (BUSPAR) 10 MG tablet, Take 1 tab po Bid for mood, Disp: 180 tablet, Rfl: 1    Cholecalciferol (Vitamin D3) 50 MCG (2000 UT) capsule, Take 1 capsule by mouth Daily., Disp: , Rfl:     ezetimibe (ZETIA) 10 MG tablet, Take 1 tablet by mouth Daily., Disp: 90 tablet, Rfl: 3    Fluticasone-Umeclidin-Vilant (Trelegy Ellipta) 100-62.5-25 MCG/ACT  "inhaler, Inhale 1 puff Daily., Disp: 3 each, Rfl: 1    furosemide (LASIX) 40 MG tablet, Take 1 tablet by mouth 2 (Two) Times a Day., Disp: 60 tablet, Rfl: 2    metFORMIN ER (GLUCOPHAGE-XR) 500 MG 24 hr tablet, Take 2 tablets by mouth Daily With Dinner., Disp: 180 tablet, Rfl: 1    montelukast (SINGULAIR) 10 MG tablet, Take 1 tablet by mouth Every Night., Disp: 90 tablet, Rfl: 1    pantoprazole (PROTONIX) 40 MG EC tablet, Take 1 tablet by mouth Daily., Disp: 90 tablet, Rfl: 1    potassium chloride 10 MEQ CR tablet, Take 2 tablets by mouth Daily., Disp: 120 tablet, Rfl: 1    sacubitril-valsartan (Entresto) 49-51 MG tablet, Take 1 tablet by mouth 2 (Two) Times a Day., Disp: 180 tablet, Rfl: 3    sertraline (ZOLOFT) 100 MG tablet, Take 2 tablets by mouth Every Night. TAKE ONE TABLET BY MOUTH DAILY FOR MOOD, Disp: 180 tablet, Rfl: 1    sotalol (Betapace) 120 MG tablet, Take 1 tablet by mouth 2 (Two) Times a Day., Disp: 60 tablet, Rfl: 2    Allergies:   No Known Allergies    Objective   Vital Signs: Blood pressure 110/80, pulse 99, height 179.1 cm (70.5\"), weight 98 kg (216 lb), SpO2 99%.    PHYSICAL EXAM  General appearance: Awake, alert, cooperative  Head: Normocephalic, without obvious abnormality, atraumatic  Neck: No JVD  Lungs: Clear to ascultation bilaterally  Heart: Regular rate and rhythm, no murmurs, 2+ LE pulses, no lower extremity swelling  Skin: Skin color, turgor normal, no rashes or lesions  Neurologic: Grossly normal     Lab Results   Component Value Date    GLUCOSE 162 (H) 02/15/2024    CALCIUM 9.6 02/15/2024     02/15/2024    K 4.7 02/15/2024    CO2 28 02/15/2024    CL 99 02/15/2024    BUN 13 02/15/2024    CREATININE 1.07 02/15/2024    BCR 12 02/15/2024    ANIONGAP 13.0 11/29/2023     Lab Results   Component Value Date    WBC 7.7 02/15/2024    HGB 17.6 02/15/2024    HCT 52.9 (H) 02/15/2024    MCV 93 02/15/2024     02/15/2024     No results found for: \"INR\", \"PROTIME\"  Lab Results   Component " Value Date    TSH 3.270 10/11/2023          Results for orders placed in visit on 10/25/23    Adult Transthoracic Echo Complete W/ Cont if Necessary Per Protocol    Interpretation Summary    Left ventricular diastolic function was not assessed.    The left atrial cavity is moderately dilated.    The right atrial cavity is dilated.    Estimated right ventricular systolic pressure from tricuspid regurgitation is normal (<35 mmHg).    The aortic root measures 3.1 cm.    This is  very Limited examination poor windows    Grossly the LV might be normal    Both atria seems likely enalrged    Rv could not be seens well    Valve could not be evaluated    No pericardial effusion     Results for orders placed during the hospital encounter of 11/29/23    Cardiac Catheterization/Vascular Study    Conclusion    Minor nonobstructive CAD, mid LAD diffuse 20%, dominant circumflex with a mid LPDA 40-50% lesion, normal nondominant small RCA    LV pressures (S/D/E) : 134/-1/7 mmHg      I personally viewed and interpreted the patient's EKG/Telemetry/lab data    Procedures  EKG reviewed, shows atrial fibrillation, right bundle branch block  Kerrie Medrano  reports that he has never smoked. He has never been exposed to tobacco smoke. His smokeless tobacco use includes chew..Advance Care Planning   Advance Care Planning: ACP discussion was held with the patient during this visit. Patient does not have an advance directive, information provided.     Assessment & Plan    1. Atrial fibrillation with RVR  Patient is referred for further management of atrial fibrillation and atrial flutter.  He was first diagnosed in October 2023, but is uncertain how long was going on before then.  I personally reviewed his prior EKGs.  A lot of these appear to show an organized atrial flutter, but at times it appears disorganized and atrial fibrillation.  Flutter, if present, does not always appear typical as well.  We discussed the pathophysiology of atrial  flutter and atrial fibrillation as well as potential treatment options going forward.  I do not think he is a good candidate for catheter ablation due to his overall health and multiple comorbidities.  He has severe COPD with chronic 3 L oxygen dependence.  I think the perceived success rate of an ablation in this scenario would not outweigh the potential risks.    We did discuss other antiarrhythmic options for management of atrial fibrillation.  The main goal for this would be improvement of symptoms.  His main symptom is dyspnea, it is uncertain how much atrial fibrillation contributed this on top of his COPD.  I do think the best option from an antiarrhythmic standpoint would be Tikosyn.  He is open to trying this.  Will plan to get him in the hospital for this, hold sotalol for 5 days prior.  I would still put this in about a 50% chance of basket keeping him in rhythm for a significant amount of time.  Of note, he is on Zoloft since prolong the QT interval, however I did find EKGs from prior to starting sotalol with a relatively short QT so hopefully he can tolerate the Tikosyn.     2. Primary hypertension  Blood pressure is well-controlled today.  Will continue current management.    3. Coronary artery disease of native artery of native heart with stable angina pectoris  No anginal symptoms currently.       Follow Up:  Return in about 6 months (around 8/22/2024).      Thank you for allowing me to participate in the care of your patient. Please do not hesitate to contact me with additional questions or concerns.      Johann Joaquin M.D.  Cardiac Electrophysiologist  [facility] / [facility]

## 2024-02-21 NOTE — TELEPHONE ENCOUNTER
Spoke with daughter, Zulma, and advised that I called [employer] and got the OOP amount for 1/1/24 to today and he has met 408.76.  To qualify for Free Eliquis he has to meet 540.48.  I will continue to call and check monthly and once met will fax the receipts to [employer]. She verbalized understanding.

## 2024-02-22 ENCOUNTER — OFFICE VISIT (OUTPATIENT)
Dept: CARDIOLOGY | Facility: CLINIC | Age: 79
End: 2024-02-22
Payer: MEDICARE

## 2024-02-22 VITALS
DIASTOLIC BLOOD PRESSURE: 80 MMHG | OXYGEN SATURATION: 99 % | BODY MASS INDEX: 30.24 KG/M2 | HEART RATE: 99 BPM | WEIGHT: 216 LBS | HEIGHT: 71 IN | SYSTOLIC BLOOD PRESSURE: 110 MMHG

## 2024-02-22 DIAGNOSIS — E66.01 CLASS 3 SEVERE OBESITY DUE TO EXCESS CALORIES WITH SERIOUS COMORBIDITY AND BODY MASS INDEX (BMI) OF 40.0 TO 44.9 IN ADULT: ICD-10-CM

## 2024-02-22 DIAGNOSIS — I48.91 ATRIAL FIBRILLATION WITH RVR: Primary | ICD-10-CM

## 2024-02-22 DIAGNOSIS — I10 PRIMARY HYPERTENSION: Chronic | ICD-10-CM

## 2024-02-22 DIAGNOSIS — I25.118 CORONARY ARTERY DISEASE OF NATIVE ARTERY OF NATIVE HEART WITH STABLE ANGINA PECTORIS: Chronic | ICD-10-CM

## 2024-03-12 ENCOUNTER — TELEPHONE (OUTPATIENT)
Dept: CARDIOLOGY | Facility: CLINIC | Age: 79
End: 2024-03-12
Payer: MEDICARE

## 2024-03-12 DIAGNOSIS — I48.91 ATRIAL FIBRILLATION WITH RVR: ICD-10-CM

## 2024-03-12 NOTE — TELEPHONE ENCOUNTER
Pt needs refill of Sotalol ASAP. States the pharmacy was supposed to have reached out to us weeks ago regarding getting a PA for the rx. Patient will run out tomorrow.

## 2024-03-13 RX ORDER — SOTALOL HYDROCHLORIDE 120 MG/1
120 TABLET ORAL 2 TIMES DAILY
Qty: 60 TABLET | Refills: 1 | Status: SHIPPED | OUTPATIENT
Start: 2024-03-13

## 2024-04-04 ENCOUNTER — OFFICE VISIT (OUTPATIENT)
Dept: FAMILY MEDICINE CLINIC | Facility: CLINIC | Age: 79
End: 2024-04-04
Payer: MEDICARE

## 2024-04-04 ENCOUNTER — TELEPHONE (OUTPATIENT)
Dept: CARDIOLOGY | Facility: CLINIC | Age: 79
End: 2024-04-04

## 2024-04-04 VITALS
WEIGHT: 277 LBS | DIASTOLIC BLOOD PRESSURE: 64 MMHG | SYSTOLIC BLOOD PRESSURE: 100 MMHG | OXYGEN SATURATION: 98 % | HEART RATE: 59 BPM | BODY MASS INDEX: 39.18 KG/M2

## 2024-04-04 DIAGNOSIS — E66.01 CLASS 2 SEVERE OBESITY DUE TO EXCESS CALORIES WITH SERIOUS COMORBIDITY AND BODY MASS INDEX (BMI) OF 39.0 TO 39.9 IN ADULT: ICD-10-CM

## 2024-04-04 DIAGNOSIS — I50.21 ACUTE HFREF (HEART FAILURE WITH REDUCED EJECTION FRACTION): ICD-10-CM

## 2024-04-04 DIAGNOSIS — E78.2 MIXED HYPERLIPIDEMIA: Chronic | ICD-10-CM

## 2024-04-04 DIAGNOSIS — K21.9 CHRONIC GERD: ICD-10-CM

## 2024-04-04 DIAGNOSIS — J45.40 MODERATE PERSISTENT ASTHMA WITHOUT COMPLICATION: ICD-10-CM

## 2024-04-04 DIAGNOSIS — F32.A ANXIETY AND DEPRESSION: ICD-10-CM

## 2024-04-04 DIAGNOSIS — I48.91 ATRIAL FIBRILLATION WITH RVR: ICD-10-CM

## 2024-04-04 DIAGNOSIS — R80.9 TYPE 2 DIABETES MELLITUS WITH DIABETIC MICROALBUMINURIA, WITHOUT LONG-TERM CURRENT USE OF INSULIN: Primary | ICD-10-CM

## 2024-04-04 DIAGNOSIS — F41.9 ANXIETY AND DEPRESSION: ICD-10-CM

## 2024-04-04 DIAGNOSIS — E11.29 TYPE 2 DIABETES MELLITUS WITH DIABETIC MICROALBUMINURIA, WITHOUT LONG-TERM CURRENT USE OF INSULIN: Primary | ICD-10-CM

## 2024-04-04 PROBLEM — E66.812 CLASS 2 SEVERE OBESITY DUE TO EXCESS CALORIES WITH SERIOUS COMORBIDITY AND BODY MASS INDEX (BMI) OF 39.0 TO 39.9 IN ADULT: Status: ACTIVE | Noted: 2023-07-03

## 2024-04-04 LAB
EXPIRATION DATE: ABNORMAL
EXPIRATION DATE: NORMAL
HBA1C MFR BLD: 7.1 % (ref 4.5–5.7)
Lab: ABNORMAL
Lab: NORMAL
POC CREATININE URINE: 10
POC MICROALBUMIN URINE: 10

## 2024-04-04 RX ORDER — PANTOPRAZOLE SODIUM 40 MG/1
40 TABLET, DELAYED RELEASE ORAL DAILY
Qty: 90 TABLET | Refills: 1 | Status: SHIPPED | OUTPATIENT
Start: 2024-04-04

## 2024-04-04 RX ORDER — ATORVASTATIN CALCIUM 80 MG/1
TABLET, FILM COATED ORAL
Qty: 90 TABLET | Refills: 1 | Status: SHIPPED | OUTPATIENT
Start: 2024-04-04

## 2024-04-04 RX ORDER — METFORMIN HYDROCHLORIDE 500 MG/1
1000 TABLET, EXTENDED RELEASE ORAL
Qty: 180 TABLET | Refills: 1 | Status: SHIPPED | OUTPATIENT
Start: 2024-04-04

## 2024-04-04 RX ORDER — SERTRALINE HYDROCHLORIDE 100 MG/1
200 TABLET, FILM COATED ORAL NIGHTLY
Qty: 180 TABLET | Refills: 1 | Status: SHIPPED | OUTPATIENT
Start: 2024-04-04

## 2024-04-04 RX ORDER — SACUBITRIL AND VALSARTAN 49; 51 MG/1; MG/1
1 TABLET, FILM COATED ORAL 2 TIMES DAILY
Qty: 180 TABLET | Refills: 1 | Status: SHIPPED | OUTPATIENT
Start: 2024-04-04

## 2024-04-04 RX ORDER — MONTELUKAST SODIUM 10 MG/1
10 TABLET ORAL NIGHTLY
Qty: 90 TABLET | Refills: 1 | Status: SHIPPED | OUTPATIENT
Start: 2024-04-04

## 2024-04-04 RX ORDER — ALBUTEROL SULFATE 90 UG/1
2 AEROSOL, METERED RESPIRATORY (INHALATION) EVERY 4 HOURS PRN
Qty: 18 G | Refills: 6 | Status: SHIPPED | OUTPATIENT
Start: 2024-04-04

## 2024-04-04 RX ORDER — BUSPIRONE HYDROCHLORIDE 10 MG/1
TABLET ORAL
Qty: 180 TABLET | Refills: 1 | Status: SHIPPED | OUTPATIENT
Start: 2024-04-04

## 2024-04-04 NOTE — TELEPHONE ENCOUNTER
Caller: AdityaApril    Relationship to patient: Emergency Contact    Best call back number: [phone]    Chief complaint:     Type of visit: FOLLOW UP    Requested date: NONE     If rescheduling, when is the original appointment: NONE     Additional notes: [facility] HAS NO SCHEDULING TIME FRAME, PATIENT WAS LAST SEEN 2.2024. PLEASE CALL THE ABOVE TO SCHEDULE, SHE IS HIS TRANSPORTATION.

## 2024-04-04 NOTE — ASSESSMENT & PLAN NOTE
Diabetes is worsening.   Medication changes per orders.  Recommended an ADA diet.  Regular aerobic exercise.  Diabetes will be reassessed  4 mos

## 2024-04-04 NOTE — ASSESSMENT & PLAN NOTE
Lipid abnormalities are worsening.  Discussed diet and exercise along with better medication compliance with his atorvastatin.  Lipids will be reassessed at next followup visit

## 2024-04-04 NOTE — ASSESSMENT & PLAN NOTE
Patient's (Body mass index is 39.18 kg/m².) indicates that they are morbidly/severely obese (BMI > 40 or > 35 with obesity - related health condition) with health conditions that include obstructive sleep apnea, hypertension, coronary heart disease, dyslipidemias, GERD, and osteoarthritis . Weight is unchanged. BMI  is above average; BMI management plan is completed. We discussed portion control and increasing exercise.

## 2024-04-04 NOTE — PROGRESS NOTES
Chief Complaint  Diabetes, Afib, HTN,     Subjective    History of Present Illness:  Kerrie Medrano is a 78 y.o. male who presents today for follow-up after episode of atrial fibrillation during preop workup.    He is establishing care with a new provider at [facility], [place], operated by [facility] cardiology later this month.    Blood pressure has been running a little lower and we discussed looking at sotalol dose reduction and they plan to discuss this with cardiology follow-up.    He is having recurrent problems with knee osteoarthritis and is hopeful we can get clearance to have knee replacement surgery at his follow-up with cardiology.    A1c does remain above goal today at 7.1 and we did discuss medication change options and we will try and add in Jardiance for both cardiovascular risk reduction and diabetes control.  Encourage diet and exercise efforts.    Anxiety stable with zoloft and buspar combination.    No problems with current regimen for hypertension, hyperlipidemia, anxiety, depression, GERD, and environmental asthma.     His pulmonologist retired and we have continued him on Trelegy for environmental asthma.      Objective   Vital Signs:   /64   Pulse 59   Wt 126 kg (277 lb)   SpO2 98%   BMI 39.18 kg/m²     Review of Systems   Constitutional:  Positive for fatigue. Negative for appetite change, chills and fever.   HENT:  Negative for hearing loss.    Eyes:  Negative for blurred vision.   Respiratory:  Negative for chest tightness.    Cardiovascular:  Negative for chest pain.   Gastrointestinal:  Negative for abdominal pain.   Musculoskeletal:  Positive for arthralgias and gait problem.        Worsening right knee arthritis   Skin:  Negative for rash.   Psychiatric/Behavioral:  Negative for depressed mood.        Past History:  Medical History: has a past medical history of Benign essential hypertension, Disorder of hair and hair follicles, and Joint pain.   Surgical History: has a past surgical history that includes  Colonoscopy (04/2009); Skin cancer excision; Knee surgery; Appendectomy (1960); Cardiac catheterization; and Cardiac catheterization (Left, 11/29/2023).   Family History: family history includes Heart attack in his brother, father, and mother; Skin cancer (age of onset: 50) in his brother.   Social History: reports that he has never smoked. He has never been exposed to tobacco smoke. His smokeless tobacco use includes chew. He reports that he does not drink alcohol and does not use drugs.      Current Outpatient Medications:     albuterol sulfate  (90 Base) MCG/ACT inhaler, Inhale 2 puffs Every 4 (Four) Hours As Needed for Wheezing (emphysema)., Disp: 18 g, Rfl: 6    apixaban (ELIQUIS) 5 MG tablet tablet, Take 1 tablet by mouth 2 (Two) Times a Day., Disp: 180 tablet, Rfl: 1    aspirin 81 MG EC tablet, Take 1 tablet by mouth Daily., Disp: , Rfl:     atorvastatin (LIPITOR) 80 MG tablet, Take 1 tab po daily for cholesterol, Disp: 90 tablet, Rfl: 1    busPIRone (BUSPAR) 10 MG tablet, Take 1 tab po Bid for mood, Disp: 180 tablet, Rfl: 1    Cholecalciferol (Vitamin D3) 50 MCG (2000 UT) capsule, Take 1 capsule by mouth Daily., Disp: , Rfl:     ezetimibe (ZETIA) 10 MG tablet, Take 1 tablet by mouth Daily., Disp: 90 tablet, Rfl: 3    Fluticasone-Umeclidin-Vilant (Trelegy Ellipta) 100-62.5-25 MCG/ACT inhaler, Inhale 1 puff Daily., Disp: 3 each, Rfl: 1    furosemide (LASIX) 40 MG tablet, Take 1 tablet by mouth 2 (Two) Times a Day., Disp: 60 tablet, Rfl: 2    metFORMIN ER (GLUCOPHAGE-XR) 500 MG 24 hr tablet, Take 2 tablets by mouth Daily With Dinner., Disp: 180 tablet, Rfl: 1    montelukast (SINGULAIR) 10 MG tablet, Take 1 tablet by mouth Every Night., Disp: 90 tablet, Rfl: 1    pantoprazole (PROTONIX) 40 MG EC tablet, Take 1 tablet by mouth Daily., Disp: 90 tablet, Rfl: 1    potassium chloride 10 MEQ CR tablet, Take 2 tablets by mouth Daily., Disp: 120 tablet, Rfl: 1    sacubitril-valsartan (Entresto) 49-51 MG tablet,  Take 1 tablet by mouth 2 (Two) Times a Day., Disp: 180 tablet, Rfl: 1    sertraline (ZOLOFT) 100 MG tablet, Take 2 tablets by mouth Every Night. TAKE ONE TABLET BY MOUTH DAILY FOR MOOD, Disp: 180 tablet, Rfl: 1    sotalol (Betapace) 120 MG tablet, Take 1 tablet by mouth 2 (Two) Times a Day., Disp: 60 tablet, Rfl: 1    empagliflozin (Jardiance) 25 MG tablet tablet, Take 1 tablet by mouth Daily., Disp: 30 tablet, Rfl: 5    Allergies: Patient has no known allergies.    Physical Exam  Constitutional:       Appearance: He is obese.   HENT:      Head: Normocephalic.      Right Ear: External ear normal.      Left Ear: External ear normal.      Nose: Nose normal.   Eyes:      Pupils: Pupils are equal, round, and reactive to light.   Cardiovascular:      Rate and Rhythm: Normal rate. Rhythm irregular.      Heart sounds: Normal heart sounds.   Pulmonary:      Effort: Pulmonary effort is normal.      Breath sounds: Normal breath sounds.   Musculoskeletal:      Cervical back: Normal range of motion.      Comments: Unchanged osteoarthritis with worsening right knee arthritis.  Slow but stable gait   Skin:     General: Skin is warm and dry.   Neurological:      General: No focal deficit present.      Mental Status: He is alert.   Psychiatric:         Mood and Affect: Mood normal.         Behavior: Behavior normal.         Thought Content: Thought content normal.          Result Review                   Assessment and Plan  Diagnoses and all orders for this visit:    1. Type 2 diabetes mellitus with diabetic microalbuminuria, without long-term current use of insulin (Primary)  Assessment & Plan:  Diabetes is worsening.   Medication changes per orders.  Recommended an ADA diet.  Regular aerobic exercise.  Diabetes will be reassessed  4 mos    Orders:  -     POCT glycated hemoglobin, total  -     POCT microalbumin  -     metFORMIN ER (GLUCOPHAGE-XR) 500 MG 24 hr tablet; Take 2 tablets by mouth Daily With Dinner.  Dispense: 180  tablet; Refill: 1  -     empagliflozin (Jardiance) 25 MG tablet tablet; Take 1 tablet by mouth Daily.  Dispense: 30 tablet; Refill: 5    2. Atrial fibrillation with RVR  -     apixaban (ELIQUIS) 5 MG tablet tablet; Take 1 tablet by mouth 2 (Two) Times a Day.  Dispense: 180 tablet; Refill: 1    3. Mixed hyperlipidemia  Assessment & Plan:  Lipid abnormalities are worsening.  Discussed diet and exercise along with better medication compliance with his atorvastatin.  Lipids will be reassessed at next followup visit    Orders:  -     atorvastatin (LIPITOR) 80 MG tablet; Take 1 tab po daily for cholesterol  Dispense: 90 tablet; Refill: 1    4. Anxiety and depression  Assessment & Plan:  Patient's depression is recurrent and is mild without psychosis. Their depression is currently in partial remission and the condition is improving with treatment. This will be reassessed at the next regular appointment. F/U as described:patient will continue current medication therapy.    Orders:  -     busPIRone (BUSPAR) 10 MG tablet; Take 1 tab po Bid for mood  Dispense: 180 tablet; Refill: 1  -     sertraline (ZOLOFT) 100 MG tablet; Take 2 tablets by mouth Every Night. TAKE ONE TABLET BY MOUTH DAILY FOR MOOD  Dispense: 180 tablet; Refill: 1    5. Moderate persistent asthma without complication  Assessment & Plan:  Asthma is improving with treatment.  The patient is experiencing monthly daytime asthma symptoms. He is experiencing no nighttime asthma symptoms.  Cont singulair, trelegy and prn Alb HFA        Orders:  -     albuterol sulfate  (90 Base) MCG/ACT inhaler; Inhale 2 puffs Every 4 (Four) Hours As Needed for Wheezing (emphysema).  Dispense: 18 g; Refill: 6  -     Fluticasone-Umeclidin-Vilant (Trelegy Ellipta) 100-62.5-25 MCG/ACT inhaler; Inhale 1 puff Daily.  Dispense: 3 each; Refill: 1  -     montelukast (SINGULAIR) 10 MG tablet; Take 1 tablet by mouth Every Night.  Dispense: 90 tablet; Refill: 1    6. Acute HFrEF (heart  failure with reduced ejection fraction)  -     sacubitril-valsartan (Entresto) 49-51 MG tablet; Take 1 tablet by mouth 2 (Two) Times a Day.  Dispense: 180 tablet; Refill: 1    7. Chronic GERD  Assessment & Plan:  Well-controlled with Protonix.  No dysphagia.  Ongoing treatment of GERD does help with asthma control as well    Orders:  -     pantoprazole (PROTONIX) 40 MG EC tablet; Take 1 tablet by mouth Daily.  Dispense: 90 tablet; Refill: 1    8. Class 2 severe obesity due to excess calories with serious comorbidity and body mass index (BMI) of 39.0 to 39.9 in adult  Assessment & Plan:  Patient's (Body mass index is 39.18 kg/m².) indicates that they are morbidly/severely obese (BMI > 40 or > 35 with obesity - related health condition) with health conditions that include obstructive sleep apnea, hypertension, coronary heart disease, dyslipidemias, GERD, and osteoarthritis . Weight is unchanged. BMI  is above average; BMI management plan is completed. We discussed portion control and increasing exercise.                      Follow Up  Return in about 4 months (around 8/4/2024) for Medicare Wellness, Fasting for labs at appointment (but drink water!).    Jermaine Lopez MD

## 2024-04-08 ENCOUNTER — TELEPHONE (OUTPATIENT)
Dept: CARDIOLOGY | Facility: CLINIC | Age: 79
End: 2024-04-08
Payer: MEDICARE

## 2024-04-08 NOTE — TELEPHONE ENCOUNTER
Spoke with Daughter, Zulma, and advised  has approved for him to get his eliquis for free through 12/31/2024.

## 2024-04-10 ENCOUNTER — PATIENT OUTREACH (OUTPATIENT)
Dept: CASE MANAGEMENT | Facility: CLINIC | Age: 79
End: 2024-04-10
Payer: MEDICARE

## 2024-04-10 ENCOUNTER — TELEPHONE (OUTPATIENT)
Dept: FAMILY MEDICINE CLINIC | Facility: CLINIC | Age: 79
End: 2024-04-10
Payer: MEDICARE

## 2024-04-10 ENCOUNTER — OFFICE VISIT (OUTPATIENT)
Dept: CARDIOLOGY | Facility: CLINIC | Age: 79
End: 2024-04-10
Payer: MEDICARE

## 2024-04-10 VITALS
DIASTOLIC BLOOD PRESSURE: 60 MMHG | HEIGHT: 70 IN | RESPIRATION RATE: 20 BRPM | BODY MASS INDEX: 39.22 KG/M2 | OXYGEN SATURATION: 97 % | WEIGHT: 274 LBS | HEART RATE: 95 BPM | SYSTOLIC BLOOD PRESSURE: 110 MMHG

## 2024-04-10 DIAGNOSIS — I25.118 CORONARY ARTERY DISEASE OF NATIVE ARTERY OF NATIVE HEART WITH STABLE ANGINA PECTORIS: ICD-10-CM

## 2024-04-10 DIAGNOSIS — I48.19 ATRIAL FIBRILLATION, PERSISTENT: Primary | ICD-10-CM

## 2024-04-10 DIAGNOSIS — E78.2 MIXED HYPERLIPIDEMIA: ICD-10-CM

## 2024-04-10 DIAGNOSIS — R06.02 SHORTNESS OF BREATH: ICD-10-CM

## 2024-04-10 DIAGNOSIS — E11.29 TYPE 2 DIABETES MELLITUS WITH DIABETIC MICROALBUMINURIA, WITHOUT LONG-TERM CURRENT USE OF INSULIN: ICD-10-CM

## 2024-04-10 DIAGNOSIS — R80.9 TYPE 2 DIABETES MELLITUS WITH DIABETIC MICROALBUMINURIA, WITHOUT LONG-TERM CURRENT USE OF INSULIN: ICD-10-CM

## 2024-04-10 PROCEDURE — 3074F SYST BP LT 130 MM HG: CPT | Performed by: INTERNAL MEDICINE

## 2024-04-10 PROCEDURE — 93000 ELECTROCARDIOGRAM COMPLETE: CPT | Performed by: INTERNAL MEDICINE

## 2024-04-10 PROCEDURE — 3078F DIAST BP <80 MM HG: CPT | Performed by: INTERNAL MEDICINE

## 2024-04-10 PROCEDURE — 99214 OFFICE O/P EST MOD 30 MIN: CPT | Performed by: INTERNAL MEDICINE

## 2024-04-10 RX ORDER — METFORMIN HYDROCHLORIDE 500 MG/1
2000 TABLET, EXTENDED RELEASE ORAL
Qty: 360 TABLET | Refills: 1 | Status: SHIPPED | OUTPATIENT
Start: 2024-04-10

## 2024-04-10 NOTE — OUTREACH NOTE
AMBULATORY CASE MANAGEMENT NOTE    Name and Relationship of Patient/Support Person: ZULMA SELF - Emergency Contact    Patient Outreach    Spoke with patient's daughter Zulma at this time for ACM monthly outreach, identified self and role.  This ACM assisting ACM Anali Umana.  Daughter states patient has been doing well.  She states her sister has stepped up and is helping to transport patient to his doctors appointments.  ACM notes patient has appt with cardiology today.      Discussed medications.  Daughter states she was notified a few days ago that patient's Eliquis PAP was approved.  Discussed that ACM notes patient was started on Jardiance at PCP appt 4/4, asked if patient has picked medication up yet.  Daughter states they have requested that the PCP select a different medication as the cost of the Jardiance is too expensive.  AC mentioned Jardiance patient assistance program through [facility], offered to email to daughter.  Also discussed Medicare Part D Extra Help Program.  Daughter agreeable to [facility] emailing information for both of these programs to [email or web address].  Daughter explained that they had questions regarding what insurance is best for patient's needs, and that they were unsure how to decide on this.  Explained that Medicare has staff who can assist with discussing options and provided her with their contact information from Medicare.gov.    ACM followed up on possible literacy concerns and how patient is able to tell which medications to take and what dosages.  Daughter states she goes to the patient's house every 1-2 weeks and sorts out his pills in a pill divider (AM and PM pills).  She also explains to the patient that his metformin is in a separate bottle for him to take one in the middle of the day.  She says this works for them, and provides her an opportunity to check on him as well.  Briefly discussed the option of using pill packs put together by the pharmacy, daughter  states they are not interested at this time as it seems his medications are changed pretty frequently.    Patient's daughter denies any other needs at this time.        Gladys ESTRADA  Ambulatory Case Management    4/10/2024, 14:06 EDT

## 2024-04-10 NOTE — ASSESSMENT & PLAN NOTE
Lipid abnormalities are stable, to target.    Plan:  Continue same medication/s without change.      Discussed medication dosage, use, side effects, and goals of treatment in detail.    Counseled patient on lifestyle modifications to help control hyperlipidemia.   Advised patient to exercise for 150 minutes weekly. (30 minute brisk walk, 5 days a week for example)  Weight Loss encouraged  Heart healthy low-fat low-cholesterol diet recommended.  Last lipid profile and patient showing total cholesterol 115, triglycerides 69, LDL 52, HDL 49.    Patient Treatment Goals:   LDL goal is less than 70    Followup in 6 months.

## 2024-04-10 NOTE — ASSESSMENT & PLAN NOTE
Symptomatic atrial fibrillation, associated with shortness of breath and decreased excess capacity.  Cardioversion 1/14/2024 on sotalol and Eliquis.  Recurrence poorly tolerated.  Patient referred to EP with plan to change to Tikosyn.  Patient readmitted to the hospital for shortness of breath/exacerbation, and he missed his follow-ups.  Rate controlled today on EKG.  No signs of volume overload.  Plan:  To discuss with Dr. Joaquin regarding plans for Tikosyn initiation admission  Can consider amiodarone alternatively, not ideal in view of baseline lung disease (COPD) and obstructive sleep apnea  Continue Eliquis and sotalol for now

## 2024-04-10 NOTE — PROGRESS NOTES
[facility] [place]  [facility] CARDIOLOGY  [address] [place] [address]  Dept: [phone]  Dept Fax: [phone]    Date: 04/10/2024  Patient: Kerrie Medrano  YOB: 1945    Follow Up Office Visit Note    Interval Follow-up  Mr. Kerrie Medrano is a 78 y.o. male who is here for follow-up on Atrial Fibrillation and Shortness of Breath.    Subjective   Patient overall doing the same.  Interval admission to the hospital for shortness of breath.  Patient with dyspnea on exertion mild to moderate daily activities.   Infrequent palpitations. Patient denies angina, orthopnea, PND, lightheadedness, syncope or medications side-effects.    The following portions of the patient's history were reviewed and updated as appropriate: allergies, current medications, past family history, past medical history, past social history, past surgical history, and problem list.    Medications: No Known Allergies   Current Outpatient Medications   Medication Instructions    albuterol sulfate  (90 Base) MCG/ACT inhaler 2 puffs, Inhalation, Every 4 Hours PRN    apixaban (ELIQUIS) 5 mg, Oral, 2 Times Daily    aspirin 81 mg, Oral, Daily    atorvastatin (LIPITOR) 80 MG tablet Take 1 tab po daily for cholesterol    busPIRone (BUSPAR) 10 MG tablet Take 1 tab po Bid for mood    ezetimibe (ZETIA) 10 mg, Oral, Daily    Fluticasone-Umeclidin-Vilant (Trelegy Ellipta) 100-62.5-25 MCG/ACT inhaler 1 puff, Inhalation, Daily - RT    furosemide (LASIX) 40 mg, Oral, 2 Times Daily    metFORMIN ER (GLUCOPHAGE-XR) 1,000 mg, Oral, Daily With Dinner    montelukast (SINGULAIR) 10 mg, Oral, Nightly    pantoprazole (PROTONIX) 40 mg, Oral, Daily    potassium chloride 10 MEQ CR tablet 20 mEq, Oral, Daily    sacubitril-valsartan (Entresto) 49-51 MG tablet 1 tablet, Oral, 2 Times Daily    sertraline (ZOLOFT) 200 mg, Oral, Nightly, TAKE ONE TABLET BY MOUTH DAILY FOR MOOD    sotalol (BETAPACE) 120 mg, Oral, 2 Times Daily     "Vitamin D3 2,000 Units, Oral, Daily       Tobacco Use: High Risk (4/10/2024)    Patient History     Smoking Tobacco Use: Never     Smokeless Tobacco Use: Current     Passive Exposure: Never        Objective  Vitals:    04/10/24 1513   BP: 110/60   BP Location: Right arm   Patient Position: Sitting   Pulse: 95   Resp: 20   SpO2: 97%   Weight: 124 kg (274 lb)   Height: 177.8 cm (70\")   PainSc: 0-No pain      Vitals:    04/10/24 1513   BP: 110/60   BP Location: Right arm   Patient Position: Sitting   Pulse: 95   Resp: 20   SpO2: 97%   Weight: 124 kg (274 lb)   Height: 177.8 cm (70\")          Physical Exam  Constitutional:       Appearance: Healthy appearance. Not in distress.   Eyes:      Pupils: Pupils are equal, round, and reactive to light.   Neck:      Vascular: No JVR. JVD normal.   Pulmonary:      Effort: Pulmonary effort is normal.      Breath sounds: Normal breath sounds. No wheezing. No rhonchi. No rales.   Chest:      Chest wall: Not tender to palpatation.   Cardiovascular:      PMI at left midclavicular line. Normal rate. Irregularly irregular rhythm. Normal S1 with normal intensity. Normal S2 with normal intensity.       Murmurs: There is no murmur.      No gallop.  No click. No rub.      Comments: Poor circulation  Pulses:     Intact distal pulses.   Edema:     Peripheral edema present.     Feet: bilateral trace edema of the feet.  Abdominal:      General: There is no abdominal bruit.   Skin:     General: Skin is warm.   Neurological:      Mental Status: Alert and oriented to person, place and time.              Diagnostic Data  Lab Results   Component Value Date     02/15/2024    K 4.7 02/15/2024    CL 99 02/15/2024    CO2 28 02/15/2024    MG 2.3 01/24/2024    BUN 13 02/15/2024    CREATININE 1.07 02/15/2024    CALCIUM 9.6 02/15/2024    BILITOT 1.0 10/10/2023    ALKPHOS 88 10/10/2023    ALT 19 10/10/2023    AST 20 10/10/2023    GLUCOSE 162 (H) 02/15/2024    ALBUMIN 4.2 10/10/2023     Lab Results " "  Component Value Date    WBC 7.7 02/15/2024    HGB 17.6 02/15/2024    HCT 52.9 (H) 02/15/2024     02/15/2024     No results found for: \"APTT\", \"INR\", \"PTT\"  Lab Results   Component Value Date    TROPONINT 14 10/11/2023     Lab Results   Component Value Date    PROBNP 640 (H) 10/11/2023       Lab Results   Component Value Date    CHLPL 188 10/10/2023    TRIG 114 10/10/2023    HDL 49 10/10/2023     (H) 10/10/2023     Lab Results   Component Value Date    TSH 3.270 10/11/2023    FREET4 1.10 10/10/2023       [facility]:    ECG 12 Lead    Date/Time: 4/10/2024 4:15 PM  Performed by: Vivek Dutta MD    Authorized by: Vivek Dutta MD  Comparison: compared with previous ECG from 2/22/2024  Comparison to previous ECG: New onset PVCs  Rhythm: atrial fibrillation  Conduction: right bundle branch block and left anterior fascicular block  QRS axis: left  Comments: Atrial fibrillation with PVCs, right bundle branch block, left axis deviation, left anterior fascicular block.          CXR: Results for orders placed in visit on 10/10/23    XR Chest 2 View    Narrative  XR CHEST 2 VW    Date of Exam: 10/10/2023 1:35 PM CDT    Indication: preop    Comparison: None available.    Findings:  Cardiomediastinal silhouette is unremarkable.  No airspace disease, pneumothorax, nor pleural effusion.No acute osseous abnormality identified.    Impression  Impression:  No acute process identified.      Electronically Signed: Nicolás Gutierrez MD  10/10/2023 1:57 PM CDT  Workstation ID: [id number]     ECHO/MUGA: Results for orders placed in visit on 10/25/23    Adult Transthoracic Echo Complete W/ Cont if Necessary Per Protocol    Interpretation Summary    Left ventricular diastolic function was not assessed.    The left atrial cavity is moderately dilated.    The right atrial cavity is dilated.    Estimated right ventricular systolic pressure from tricuspid regurgitation is normal (<35 mmHg).    The aortic root measures 3.1 cm.    " This is  very Limited examination poor windows    Grossly the LV might be normal    Both atria seems likely enalrged    Rv could not be seens well    Valve could not be evaluated    No pericardial effusion     STRESS TESTS: Results for orders placed in visit on 10/25/23    Stress Test With Myocardial Perfusion One Day    Interpretation Summary    Left ventricular ejection fraction is borderline normal (Calculated EF = 50%). and dropps to 40% at stress    Myocardial perfusion imaging indicates a moderate-sized, moderate-to-severe area of ischemia located in the apex.    Impressions are consistent with a high risk study.    Findings consistent with an indeterminate ECG stress test.     CARDIAC CATH: Results for orders placed during the hospital encounter of 11/29/23    Cardiac Catheterization/Vascular Study    Conclusion    Minor nonobstructive CAD, mid LAD diffuse 20%, dominant circumflex with a mid LPDA 40-50% lesion, normal nondominant small RCA    LV pressures (S/D/E) : 134/-1/7 mmHg     DEVICES: No valid procedures specified.   HOLTER: No results found for this or any previous visit.     CT/MRI:  Results for orders placed in visit on 01/24/24    CT Angiogram Chest    Narrative  CT ANGIOGRAM CHEST    Date of Exam: 1/24/2024 2:55 PM EST    Indication: abnormal cxr, chronic sob, chest pain.    Comparison: None available.    Technique: CTA of the chest was performed before and after the uneventful intravenous administration of 90 cc Isovue-370. Reconstructed coronal and sagittal images were also obtained. In addition, a 3-D volume rendered image was created for  interpretation. Automated exposure control and iterative reconstruction methods were used.      Findings:  There are no filling defects suspicious for pulmonary emboli. There are no enlarged mediastinal nodes. There are coronary calcifications. There is mild cardiomegaly. There are no pleural effusions. There is pulmonary vascular congestion with  slightly  prominent interstitial pattern. There is no focal consolidation. There are no noncalcified pulmonary nodules or masses.    Impression  Impression:  Negative exam for pulmonary embolism.    Findings suggest CHF with mild pulmonary edema.        Electronically Signed: Jasmine Mcduffie MD  1/24/2024 3:54 PM EST  Workstation ID: [id number]    VASCULAR: No valid procedures specified.     Assessment and Plan  Diagnoses and all orders for this visit:    1. Atrial fibrillation, persistent (Primary)  Assessment & Plan:  Symptomatic atrial fibrillation, associated with shortness of breath and decreased excess capacity.  Cardioversion 1/14/2024 on sotalol and Eliquis.  Recurrence poorly tolerated.  Patient referred to EP with plan to change to Tikosyn.  Patient readmitted to the hospital for shortness of breath/exacerbation, and he missed his follow-ups.  Rate controlled today on EKG.  No signs of volume overload.  Plan:  To discuss with Dr. Joaquin regarding plans for Tikosyn initiation admission  Can consider amiodarone alternatively, not ideal in view of baseline lung disease (COPD) and obstructive sleep apnea  Continue Eliquis and sotalol for now    Orders:  -     ECG 12 Lead    2. Coronary artery disease of native artery of native heart with stable angina pectoris  Assessment & Plan:  1/29/2023: LHC at [facility], minor 20% mid LAD diffuse stenosis, distal L PDA 40-50%, small nondominant RCA.  LVEDP 7 mmHg.  Coronary artery disease is stable.  Blood pressure and heart rate to goal.  Lipids target less than 70 mg/dL.  Continue current treatment regimen. Dietary sodium restriction. Weight loss. Regular aerobic exercise.  Cardiac status will be reassessed in 6 months.      3. Mixed hyperlipidemia  Assessment & Plan:   Lipid abnormalities are stable, to target.    Plan:  Continue same medication/s without change.      Discussed medication dosage, use, side effects, and goals of treatment in detail.    Counseled patient on  lifestyle modifications to help control hyperlipidemia.   Advised patient to exercise for 150 minutes weekly. (30 minute brisk walk, 5 days a week for example)  Weight Loss encouraged  Heart healthy low-fat low-cholesterol diet recommended.  Last lipid profile and patient showing total cholesterol 115, triglycerides 69, LDL 52, HDL 49.    Patient Treatment Goals:   LDL goal is less than 70    Followup in 6 months.      4. Shortness of breath  Assessment & Plan:  Multifactorial related to COPD, sleep apnea possibly affecting RV, atrial fibrillation, HFpEF last known ejection fraction 50 to 55% on SONIA prior to cardioversion.  NYHA II-III, class C.  No volume overload.  BP and heart rate to goal.  Recent admission to the hospital nonetheless concerning for poor control.  In atrial fibrillation. Plan:  Strongly consider rhythm control in view of active CHF to help with symptoms  Continue optimal therapy for now with Entresto, sotalol, furosemide  Consider adding spironolactone after repeat labs next visit  Patient on Farxiga for unclear reasons, consider resuming if not related to side effects           Return in about 3 months (around 7/10/2024) for Follow-up with Dr Dutta, Next scheduled follow up.    There are no Patient Instructions on file for this visit.    Vivek Dutta MD

## 2024-04-10 NOTE — ASSESSMENT & PLAN NOTE
1/29/2023: [facility], minor 20% mid LAD diffuse stenosis, distal L PDA 40-50%, small nondominant RCA.  LVEDP 7 mmHg.  Coronary artery disease is stable.  Blood pressure and heart rate to goal.  Lipids target less than 70 mg/dL.  Continue current treatment regimen. Dietary sodium restriction. Weight loss. Regular aerobic exercise.  Cardiac status will be reassessed in 6 months.

## 2024-04-10 NOTE — ASSESSMENT & PLAN NOTE
Multifactorial related to COPD, sleep apnea possibly affecting RV, atrial fibrillation, HFpEF last known ejection fraction 50 to 55% on SONIA prior to cardioversion.  NYHA II-III, class C.  No volume overload.  BP and heart rate to goal.  Recent admission to the hospital nonetheless concerning for poor control.  In atrial fibrillation. Plan:  Strongly consider rhythm control in view of active CHF to help with symptoms  Continue optimal therapy for now with Entresto, sotalol, furosemide  Consider adding spironolactone after repeat labs next visit  Patient on Farxiga for unclear reasons, consider resuming if not related to side effects

## 2024-04-11 ENCOUNTER — TELEPHONE (OUTPATIENT)
Dept: CARDIOLOGY | Facility: CLINIC | Age: 79
End: 2024-04-11
Payer: MEDICARE

## 2024-04-11 DIAGNOSIS — I48.91 ATRIAL FIBRILLATION WITH RVR: Primary | ICD-10-CM

## 2024-04-11 NOTE — TELEPHONE ENCOUNTER
Patient Needs Metformin adjusted due to the price of new medication Jardiance,  Best call back number: [phone]  
Please let patient know that I did increase his metformin to 4 tablets daily given he is unable to get Jardiance due to the prohibitive cost.    He can start by taking 3 tablets of metformin with his evening meal for 2 weeks and then go up to 4 tablets to better control his blood sugars given his insurance would not cover Jardiance.  
Pt daughter understood with no further questions  
gin
Educational POCUS

## 2024-04-22 ENCOUNTER — PREP FOR SURGERY (OUTPATIENT)
Dept: OTHER | Facility: HOSPITAL | Age: 79
End: 2024-04-22
Payer: MEDICARE

## 2024-04-22 RX ORDER — NITROGLYCERIN 0.4 MG/1
0.4 TABLET SUBLINGUAL
Status: CANCELLED | OUTPATIENT
Start: 2024-04-22

## 2024-04-29 ENCOUNTER — HOSPITAL ENCOUNTER (INPATIENT)
Facility: HOSPITAL | Age: 79
LOS: 4 days | Discharge: HOME | DRG: 243 | End: 2024-05-03
Attending: STUDENT IN AN ORGANIZED HEALTH CARE EDUCATION/TRAINING PROGRAM | Admitting: STUDENT IN AN ORGANIZED HEALTH CARE EDUCATION/TRAINING PROGRAM
Payer: MEDICARE

## 2024-04-29 DIAGNOSIS — I48.19 ATRIAL FIBRILLATION, PERSISTENT: ICD-10-CM

## 2024-04-29 DIAGNOSIS — I48.91 ATRIAL FIBRILLATION WITH RVR: Primary | ICD-10-CM

## 2024-04-29 DIAGNOSIS — R13.10 DYSPHAGIA, UNSPECIFIED TYPE: ICD-10-CM

## 2024-04-29 PROBLEM — I25.10 CORONARY ARTERY DISEASE INVOLVING NATIVE CORONARY ARTERY OF NATIVE HEART WITHOUT ANGINA PECTORIS: Status: ACTIVE | Noted: 2022-05-03

## 2024-04-29 LAB
ANION GAP SERPL CALCULATED.3IONS-SCNC: 15 MMOL/L (ref 5–15)
BUN SERPL-MCNC: 15 MG/DL (ref 8–23)
BUN/CREAT SERPL: 14 (ref 7–25)
CALCIUM SPEC-SCNC: 8.8 MG/DL (ref 8.6–10.5)
CHLORIDE SERPL-SCNC: 102 MMOL/L (ref 98–107)
CO2 SERPL-SCNC: 22 MMOL/L (ref 22–29)
CREAT SERPL-MCNC: 1.07 MG/DL (ref 0.76–1.27)
EGFRCR SERPLBLD CKD-EPI 2021: 71.5 ML/MIN/1.73
GLUCOSE BLDC GLUCOMTR-MCNC: 111 MG/DL (ref 70–130)
GLUCOSE BLDC GLUCOMTR-MCNC: 114 MG/DL (ref 70–130)
GLUCOSE BLDC GLUCOMTR-MCNC: 127 MG/DL (ref 70–130)
GLUCOSE BLDC GLUCOMTR-MCNC: 157 MG/DL (ref 70–130)
GLUCOSE SERPL-MCNC: 131 MG/DL (ref 65–99)
MAGNESIUM SERPL-MCNC: 1.6 MG/DL (ref 1.6–2.4)
POTASSIUM SERPL-SCNC: 3.7 MMOL/L (ref 3.5–5.2)
SODIUM SERPL-SCNC: 139 MMOL/L (ref 136–145)

## 2024-04-29 PROCEDURE — 83735 ASSAY OF MAGNESIUM: CPT

## 2024-04-29 PROCEDURE — 25010000002 MAGNESIUM SULFATE 4 GM/100ML SOLUTION

## 2024-04-29 PROCEDURE — 63710000001 INSULIN LISPRO (HUMAN) PER 5 UNITS

## 2024-04-29 PROCEDURE — 93005 ELECTROCARDIOGRAM TRACING: CPT

## 2024-04-29 PROCEDURE — 99222 1ST HOSP IP/OBS MODERATE 55: CPT

## 2024-04-29 PROCEDURE — 80048 BASIC METABOLIC PNL TOTAL CA: CPT

## 2024-04-29 PROCEDURE — 94640 AIRWAY INHALATION TREATMENT: CPT

## 2024-04-29 PROCEDURE — 93010 ELECTROCARDIOGRAM REPORT: CPT | Performed by: INTERNAL MEDICINE

## 2024-04-29 PROCEDURE — 82948 REAGENT STRIP/BLOOD GLUCOSE: CPT

## 2024-04-29 PROCEDURE — 93005 ELECTROCARDIOGRAM TRACING: CPT | Performed by: STUDENT IN AN ORGANIZED HEALTH CARE EDUCATION/TRAINING PROGRAM

## 2024-04-29 PROCEDURE — 92610 EVALUATE SWALLOWING FUNCTION: CPT

## 2024-04-29 RX ORDER — IBUPROFEN 600 MG/1
1 TABLET ORAL
Status: DISCONTINUED | OUTPATIENT
Start: 2024-04-29 | End: 2024-05-03 | Stop reason: HOSPADM

## 2024-04-29 RX ORDER — POTASSIUM CHLORIDE 750 MG/1
20 CAPSULE, EXTENDED RELEASE ORAL DAILY
Status: DISCONTINUED | OUTPATIENT
Start: 2024-04-30 | End: 2024-05-03 | Stop reason: HOSPADM

## 2024-04-29 RX ORDER — CHOLECALCIFEROL (VITAMIN D3) 125 MCG
5 CAPSULE ORAL NIGHTLY PRN
Status: DISCONTINUED | OUTPATIENT
Start: 2024-04-29 | End: 2024-05-03 | Stop reason: HOSPADM

## 2024-04-29 RX ORDER — ALBUTEROL SULFATE 2.5 MG/3ML
2.5 SOLUTION RESPIRATORY (INHALATION) EVERY 6 HOURS PRN
Status: DISCONTINUED | OUTPATIENT
Start: 2024-04-29 | End: 2024-05-03 | Stop reason: HOSPADM

## 2024-04-29 RX ORDER — POTASSIUM CHLORIDE 1.5 G/1.58G
40 POWDER, FOR SOLUTION ORAL ONCE
Status: COMPLETED | OUTPATIENT
Start: 2024-04-29 | End: 2024-04-29

## 2024-04-29 RX ORDER — DEXTROSE MONOHYDRATE 25 G/50ML
25 INJECTION, SOLUTION INTRAVENOUS
Status: DISCONTINUED | OUTPATIENT
Start: 2024-04-29 | End: 2024-05-03 | Stop reason: HOSPADM

## 2024-04-29 RX ORDER — MAGNESIUM SULFATE HEPTAHYDRATE 40 MG/ML
4 INJECTION, SOLUTION INTRAVENOUS ONCE
Status: COMPLETED | OUTPATIENT
Start: 2024-04-29 | End: 2024-04-29

## 2024-04-29 RX ORDER — NITROGLYCERIN 0.4 MG/1
0.4 TABLET SUBLINGUAL
Status: DISCONTINUED | OUTPATIENT
Start: 2024-04-29 | End: 2024-05-03 | Stop reason: HOSPADM

## 2024-04-29 RX ORDER — PANTOPRAZOLE SODIUM 40 MG/1
40 TABLET, DELAYED RELEASE ORAL
Status: DISCONTINUED | OUTPATIENT
Start: 2024-04-30 | End: 2024-05-03 | Stop reason: HOSPADM

## 2024-04-29 RX ORDER — SERTRALINE HYDROCHLORIDE 100 MG/1
100 TABLET, FILM COATED ORAL 2 TIMES DAILY
Status: DISCONTINUED | OUTPATIENT
Start: 2024-04-29 | End: 2024-05-03 | Stop reason: HOSPADM

## 2024-04-29 RX ORDER — DOFETILIDE 0.25 MG/1
250 CAPSULE ORAL EVERY 12 HOURS
Status: DISCONTINUED | OUTPATIENT
Start: 2024-04-29 | End: 2024-04-30

## 2024-04-29 RX ORDER — INSULIN LISPRO 100 [IU]/ML
2-7 INJECTION, SOLUTION INTRAVENOUS; SUBCUTANEOUS
Status: DISCONTINUED | OUTPATIENT
Start: 2024-04-29 | End: 2024-05-03 | Stop reason: HOSPADM

## 2024-04-29 RX ORDER — FUROSEMIDE 40 MG/1
40 TABLET ORAL 2 TIMES DAILY
Status: DISCONTINUED | OUTPATIENT
Start: 2024-04-29 | End: 2024-05-03 | Stop reason: HOSPADM

## 2024-04-29 RX ORDER — MONTELUKAST SODIUM 10 MG/1
10 TABLET ORAL NIGHTLY
Status: DISCONTINUED | OUTPATIENT
Start: 2024-04-29 | End: 2024-05-03 | Stop reason: HOSPADM

## 2024-04-29 RX ORDER — ATORVASTATIN CALCIUM 40 MG/1
80 TABLET, FILM COATED ORAL DAILY
Status: DISCONTINUED | OUTPATIENT
Start: 2024-04-30 | End: 2024-05-03 | Stop reason: HOSPADM

## 2024-04-29 RX ORDER — ASPIRIN 81 MG/1
81 TABLET ORAL DAILY
Status: DISCONTINUED | OUTPATIENT
Start: 2024-04-30 | End: 2024-05-03 | Stop reason: HOSPADM

## 2024-04-29 RX ORDER — NICOTINE POLACRILEX 4 MG
15 LOZENGE BUCCAL
Status: DISCONTINUED | OUTPATIENT
Start: 2024-04-29 | End: 2024-05-03 | Stop reason: HOSPADM

## 2024-04-29 RX ORDER — BUSPIRONE HYDROCHLORIDE 10 MG/1
10 TABLET ORAL EVERY 12 HOURS SCHEDULED
Status: DISCONTINUED | OUTPATIENT
Start: 2024-04-29 | End: 2024-05-03 | Stop reason: HOSPADM

## 2024-04-29 RX ORDER — METFORMIN HYDROCHLORIDE 500 MG/1
2000 TABLET, EXTENDED RELEASE ORAL
Status: DISCONTINUED | OUTPATIENT
Start: 2024-04-29 | End: 2024-04-29

## 2024-04-29 RX ORDER — METFORMIN HYDROCHLORIDE 500 MG/1
1500 TABLET, EXTENDED RELEASE ORAL
Status: DISCONTINUED | OUTPATIENT
Start: 2024-04-29 | End: 2024-05-03 | Stop reason: HOSPADM

## 2024-04-29 RX ORDER — BUDESONIDE AND FORMOTEROL FUMARATE DIHYDRATE 160; 4.5 UG/1; UG/1
2 AEROSOL RESPIRATORY (INHALATION)
Status: DISCONTINUED | OUTPATIENT
Start: 2024-04-29 | End: 2024-05-03 | Stop reason: HOSPADM

## 2024-04-29 RX ADMIN — APIXABAN 5 MG: 5 TABLET, FILM COATED ORAL at 21:09

## 2024-04-29 RX ADMIN — MONTELUKAST 10 MG: 10 TABLET, FILM COATED ORAL at 21:09

## 2024-04-29 RX ADMIN — BUSPIRONE HYDROCHLORIDE 10 MG: 10 TABLET ORAL at 21:11

## 2024-04-29 RX ADMIN — FUROSEMIDE 40 MG: 40 TABLET ORAL at 21:09

## 2024-04-29 RX ADMIN — METFORMIN HYDROCHLORIDE 1500 MG: 500 TABLET, EXTENDED RELEASE ORAL at 17:25

## 2024-04-29 RX ADMIN — BUDESONIDE AND FORMOTEROL FUMARATE DIHYDRATE 2 PUFF: 160; 4.5 AEROSOL RESPIRATORY (INHALATION) at 18:27

## 2024-04-29 RX ADMIN — DOFETILIDE 250 MCG: 0.25 CAPSULE ORAL at 21:09

## 2024-04-29 RX ADMIN — INSULIN LISPRO 2 UNITS: 100 INJECTION, SOLUTION INTRAVENOUS; SUBCUTANEOUS at 21:11

## 2024-04-29 RX ADMIN — SERTRALINE 100 MG: 100 TABLET, FILM COATED ORAL at 21:09

## 2024-04-29 RX ADMIN — POTASSIUM CHLORIDE 40 MEQ: 1.5 POWDER, FOR SOLUTION ORAL at 15:11

## 2024-04-29 RX ADMIN — SACUBITRIL AND VALSARTAN 1 TABLET: 49; 51 TABLET, FILM COATED ORAL at 21:09

## 2024-04-29 RX ADMIN — MAGNESIUM SULFATE IN WATER FOR 4 G: 40 INJECTION INTRAVENOUS at 15:11

## 2024-04-29 NOTE — Clinical Note
2   x-ray detectable sponge(s) removed from the pocket and methodical wound exploration (MWE) performed.

## 2024-04-29 NOTE — PROGRESS NOTES
"Pharmacy Consult - Tikosyn Initiation    Kerrie Medrano is a 78 y.o. male admitted for Tikosyn initiation and monitoring.    Height: 180.3 cm (71\")  Weight: 125 kg (276 lb)    Evaluation of Drug-Drug Interactions     Previous antiarrhythmic medications must be discontinued prior to admission       - Amiodarone must be discontinued >3 weeks prior to Tikosyn start       - Sotalol and class I antiarrhythmics (Dysopyramide, Flecainide, Mexiletine, Propafenone) must be discontinued >48 hours prior to Tikosyn start         - Previous antiarrhythmic therapy: Sotalol - has been off for 48 hours.     Current drug-drug interactions noted with admission medications and Tikosyn    The following medications are contraindicated with Dofetilide and will be/have been discontinued:  - Fluoroquinolones (Ciprofloxacin, Levofloxacin, Moxifloxacin, Norfloxacin)  - Azole antifungals (Itraconazole, Ketoconazole, Posaconazole)  - Hydrochlorothiazide and any combination products containing Hydrochlorothiazide   - Trimethoprim and Trimethoprim-Sulfamethoxazole   - Verapamil  - Cimetidine  - Ziprasidone   - Dolutegravir  - Sauinavir  - Thioridazine   - Fingolimod  - Megestrol  - Pimozide  - Prochloperazine  - Lamotrigine  - Ibutilide  - Procainamide  - Macrolide antibiotics (Erythromycin, Clarithromycin)  - Quetiapine  - Citalopram    The following medications are recognized to prolong QT interval, however the medication may continue during initial Dofetilide dosing:  - Loop diuretics (Bumetanide, Furosemide, Torsemide)  - Antipsychotics (Haloperidol,  Risperidone, Asenapine)   - Antidepressants (Amitriptyline, Amoxapine, Clomipramine, Desipramine, Doxepin, Imipramine, Maprotiline, Mirtazapine, Nortriptyline, Protriptyline, Triipramine)  - Diltiazem  - Ondansetron  - Ivabradine  - Eribulin  - Paliperidone  - Phenothiazines (Chlorpromazine, Fluphenazine, Mesoridazine, Perphenazine, Promazine, Trifluoperazine)    The following medications may " increase Dofetilide serum concentrations and can be co-administered:  - Azole antifungals (Fluconazole, Voriconazole)  - SSRI's ( Escitalopram, Fluvoxamine, Fluoxetine, Paroxetine, Sertraline)  - Protease Inhibitors (Amprenavir, Indinavir, Nelfinavir, Ritonavir)  - Diltiazem   - Metformin, Glyburide  - Amiloride  - Cannabinoids  - Nefazodone  - Triamterene  - Quinine    Laboratory    Results from last 7 days   Lab Units 04/29/24  1109   SODIUM mmol/L 139   POTASSIUM mmol/L 3.7   CHLORIDE mmol/L 102   CO2 mmol/L 22.0   BUN mg/dL 15   CREATININE mg/dL 1.07   GLUCOSE mg/dL 131*   CALCIUM mg/dL 8.8     Results from last 7 days   Lab Units 04/29/24  1109   MAGNESIUM mg/dL 1.6       Pharmacy will order electrolyte replacement per protocols if indicated (Mag < 2.0, K < 4.0) based on laboratory values on admission      - Magnesium replacement protocol ordered: Yes     - Potassium replacement protocol ordered: Yes    Estimated CrCl =  102.2 mL/min  (Calculated with Cockroft-Gault equation using actual body weight and serum creatinine for calculation--can use laboratory values from previous 24 hours)    Initial QTc and EKG Monitoring    QTc = 530 (measured at 471)     If QTc is:     < 440 msec: okay to proceed with initiation      > 440 msec: must contact MD or physician extender (JERRY/PA-C)             - Provider contacted: Bart Barajas PA-C            - Okay to proceed: Yes - will reduce dose to 250 mcg                    If QTc  < 500 msec and a pre-existing ventricular conduction defect exists), must contact MD or physician extender (JERRY/PAPerlaC)            - Provider contacted: No              - Okay to proceed: Yes    Patient has a functioning atrial pacemaker - No     Cardiology aware, will proceed    Initial Tikosyn dose based on Creatinine Clearance:    CrCl > 60 mL/min - Dofetilide 500 mcg PO Q12H  CrCl 40-60 mL/min - Dofetilide 250 mcg PO Q12H  CrCl 20-39 mL/min - Dofetilide 125 mcg PO Q12H  CrCl < 20 mL/min - Do  not start medication, contact MD    (Will dose medication 10 hour intervals until administration times are between 7 and 9).    Assessment/Plan:    Patient admitted for Tikosyn initiation per cardiology. Will initiate Tikosyn 250 mcg PO every 12 hours.  Monitor electrolytes and drug-drug interactions.  Pharmacy will continue to follow.    Regis Rolle, Gary  04/29/24  14:18 EDT

## 2024-04-29 NOTE — H&P
[facility] Cardiology History and Physical    4/29/2024         Subjective:      Kerrie Medrano  [id number]  1945  0    Jermaine Lopez MD    CC: Atrial Flutter      Problem List:  Persistent atrial fibrillation/flutter  Diagnosis 10/2023 incidental EKG finding atrial fibrillation versus atrial flutter with RVR, RBBB  SONIA/ECV 11/2023 ([facility]) LVEF 50%  S/p successful ECV 1/2024 on sotalol 120 twice daily with recurrence of atrial flutter within a month  HFpEF  TTE 10/2023: Poor study no LVEF measurement  [facility] (Dr. Arenas, 11/2023): Minor nonobstructive CAD, mid LAD diffuse 20%, dominant LCx with mid L PDA 40-50% lesion, normal nondominant small RCA.  LV pressures (S/D/B): 134/-1/7 mmHg  SONIA/ECV at [facility]   Nonobstructive CAD  Hypertension  Dyslipidemia  Chronic right bundle branch block  Type 2 diabetes  COPD on 3 L/min O2  BMI 39.5            has No Known Allergies.    HPI:  Mr. Medrano is a 79 y/o male with the above medical history who presents for Tikosyn initiation. He was felt to be a poor candidate for catheter ablation due to comorbidities. He has failed sotalol in the past. He complains of shortness of breath, fatigue, lightheadedness and palpitations. He has COPD and is dependent on 3 L/min supplemental O2. It is unclear how much of his symptoms are from atrial fibrillation/flutter vs his other comorbidities.          History  Family History   Problem Relation Age of Onset    Heart attack Mother     Heart attack Father     Skin cancer Brother 50    Heart attack Brother         X3     Past Surgical History:   Procedure Laterality Date    APPENDECTOMY  1960    CARDIAC CATHETERIZATION      11/29/2023 PER DR. ARENAS    CARDIAC CATHETERIZATION Left 11/29/2023    Procedure: Cardiac Catheterization/Vascular Study;  Surgeon: Glen Arenas MD;  Location: [facility] INVASIVE LOCATION;  Service: Cardiovascular;  Laterality: Left;    COLONOSCOPY  04/2009    COLON POLYP-20 CM  "INFLAMMATORY POLYP, PLAN: REPEAT IN 5 YEARS, NO FH OF COLON CANCER    KNEE SURGERY      ARTHROCENTESIS OF THE RIGHT KNEE MEDIAL JOINT LINE  AND LEFT KNEE    SKIN CANCER EXCISION        Past Medical History:   Diagnosis Date    Benign essential hypertension     Disorder of hair and hair follicles     Joint pain      Social History     Tobacco Use   Smoking Status Never    Passive exposure: Never   Smokeless Tobacco Current    Types: Chew     Social History     Substance and Sexual Activity   Alcohol Use Never     Past Surgical History:   Procedure Laterality Date    APPENDECTOMY  1960    CARDIAC CATHETERIZATION      11/29/2023 PER DR. ARENAS    CARDIAC CATHETERIZATION Left 11/29/2023    Procedure: Cardiac Catheterization/Vascular Study;  Surgeon: Glen Arenas MD;  Location: [facility] CATH INVASIVE LOCATION;  Service: Cardiovascular;  Laterality: Left;    COLONOSCOPY  04/2009    COLON POLYP-20 CM INFLAMMATORY POLYP, PLAN: REPEAT IN 5 YEARS, NO FH OF COLON CANCER    KNEE SURGERY      ARTHROCENTESIS OF THE RIGHT KNEE MEDIAL JOINT LINE  AND LEFT KNEE    SKIN CANCER EXCISION             Review of Systems  ROS  Negative or noncontributory unless otherwise specified in HPI       Objective:     height is 180.3 cm (71\") and weight is 125 kg (276 lb). His oral temperature is 97.8 °F (36.6 °C). His blood pressure is 102/74 and his pulse is 90. His respiration is 20 and oxygen saturation is 100%.     Physical Exam  Constitutional:       Appearance: He is obese.      Comments: Chronically ill-appearing   HENT:      Head: Normocephalic and atraumatic.   Eyes:      Conjunctiva/sclera: Conjunctivae normal.      Pupils: Pupils are equal, round, and reactive to light.   Cardiovascular:      Rate and Rhythm: Normal rate. Rhythm irregular.      Pulses: Normal pulses.   Pulmonary:      Effort: Pulmonary effort is normal.      Breath sounds: No wheezing, rhonchi or rales.   Abdominal:      General: Abdomen is flat.      Palpations: " "Abdomen is soft.   Musculoskeletal:      Right lower leg: No edema.      Left lower leg: No edema.   Skin:     General: Skin is warm and dry.   Neurological:      General: No focal deficit present.      Mental Status: He is alert and oriented to person, place, and time.   Psychiatric:         Mood and Affect: Mood normal.         Behavior: Behavior normal.         Cardiographics  ECG: atrial fibrillation, QTc    Imaging  No radiology results for the last 7 days      Lab Review   Lab Results   Component Value Date    GLUCOSE 131 (H) 04/29/2024    BUN 15 04/29/2024    CREATININE 1.07 04/29/2024    BCR 14.0 04/29/2024    CO2 22.0 04/29/2024    CALCIUM 8.8 04/29/2024    PROTENTOTREF 7.0 10/10/2023    ALBUMIN 4.2 10/10/2023    LABIL2 1.5 10/10/2023    AST 20 10/10/2023    ALT 19 10/10/2023     Lab Results   Component Value Date    WBC 7.7 02/15/2024    HGB 17.6 02/15/2024    HCT 52.9 (H) 02/15/2024    MCV 93 02/15/2024     02/15/2024     No results found for: \"CHOL\"  Lab Results   Component Value Date    TRIG 114 10/10/2023    TRIG 267 (H) 07/03/2023    TRIG 184 (H) 05/03/2022     Lab Results   Component Value Date    HDL 49 10/10/2023    HDL 37 (L) 07/03/2023    HDL 46 05/03/2022           Assessment:   Persistent Atrial Fibrillation, Hx of Atrial Flutter  Diagnosis 10/2023 although unknown duration prior to that  Palpitations, fatigue and worsened dyspnea when out of rhythm  SONIA/ECV 11/2023 at [facility] LVEF 50%  Failed sotalol. Not good catheter ablation candidate s/t BMI 38 with COPD and other comorbidities  Nonobstructive CAD  Asa 81, Lipitor 80  HTN  HLD  Lipitor 80  COPD on 3 L/min  SALIMA  Type 2 Diabetes  4/2024 A1c 7.1  Metformin 1500 qd with Dinner        Plan:   QTc manually measured at 465 after subtracting 26 (due to ). CrCl 56. Will start patient on Tikosyn 250 mcg bid.   Will need to watch closely with patient on Zoloft at home. Will decrease his Zoloft to 100 mg bid  Will continue home PO " diabetes medications. Can add SSI if glucose levels become an issue  CPAP    Electronically signed by Bart Barajas PA-C, 04/29/24, 3:19 PM EDT.

## 2024-04-29 NOTE — PLAN OF CARE
Goal Outcome Evaluation:  Plan of Care Reviewed With: patient, daughter                  Anticipated Discharge Disposition (SLP): home with OP services          SLP Swallowing Diagnosis: R/O pharyngeal dysphagia, esophageal dysphagia (04/29/24 1110)

## 2024-04-29 NOTE — THERAPY EVALUATION
Acute Care - Speech Language Pathology   Swallow Initial Evaluation [facility]  Clinical Swallow Evaluation     Patient Name: Kerrie Medrano  : 1945  MRN: [id number]  Today's Date: 2024               Admit Date: 2024    Visit Dx:     ICD-10-CM ICD-9-CM   1. Atrial fibrillation with RVR  I48.91 427.31   2. Dysphagia, unspecified type  R13.10 787.20     Patient Active Problem List   Diagnosis    Primary hypertension    Mixed hyperlipidemia    Anxiety and depression    Persistent insomnia    Coronary artery disease of native artery of native heart with stable angina pectoris    Chronic GERD    Primary osteoarthritis involving multiple joints    General medical exam    Class 2 severe obesity due to excess calories with serious comorbidity and body mass index (BMI) of 39.0 to 39.9 in adult    Need for hepatitis C screening test    Screening for colon cancer    Prostate cancer screening    Type 2 diabetes mellitus without complication, without long-term current use of insulin    Chronic pain of right knee    OA (osteoarthritis) of knee    Arthritis of right knee    Pre-operative cardiovascular examination    Urinary frequency    Abnormal EKG    Atrial fibrillation with RVR    Shortness of breath    RBBB    Acute HFrEF (heart failure with reduced ejection fraction)    Moderate persistent asthma without complication    SALIMA and COPD overlap syndrome    Groin pain    Atrial fibrillation, persistent     Past Medical History:   Diagnosis Date    Benign essential hypertension     Disorder of hair and hair follicles     Joint pain      Past Surgical History:   Procedure Laterality Date    APPENDECTOMY  1960    CARDIAC CATHETERIZATION      2023 PER DR. ARENAS    CARDIAC CATHETERIZATION Left 2023    Procedure: Cardiac Catheterization/Vascular Study;  Surgeon: Glen Arenas MD;  Location: [facility] INVASIVE LOCATION;  Service: Cardiovascular;  Laterality: Left;    COLONOSCOPY  2009    COLON  POLYP-20 CM INFLAMMATORY POLYP, PLAN: REPEAT IN 5 YEARS, NO FH OF COLON CANCER    KNEE SURGERY      ARTHROCENTESIS OF THE RIGHT KNEE MEDIAL JOINT LINE  AND LEFT KNEE    SKIN CANCER EXCISION         SLP Recommendation and Plan  SLP Swallowing Diagnosis: R/O pharyngeal dysphagia, esophageal dysphagia (04/29/24 1110)  SLP Diet Recommendation: soft to chew textures, whole, thin liquids (extra sauce/gravy) (04/29/24 1110)  Recommended Precautions and Strategies: upright posture during/after eating, general aspiration precautions, reflux precautions (04/29/24 1110)  SLP Rec. for Method of Medication Administration: meds whole, with puree, as tolerated (one @ a time) (04/29/24 1110)     Monitor for Signs of Aspiration: yes, notify SLP if any concerns (04/29/24 1110)  Re[place] Diagn[place] [place] (04/29/24 1110)  Swallow Criteria for Skilled Therapeutic Interventions Met: demonstrates skilled criteria (04/29/24 1110)  Anticipated Discharge Disposition (SLP): home with OP services (04/29/24 1110)  Rehab Potential/Prognosis, Swallowing: good, to achieve stated therapy goals (04/29/24 1110)        Oral Care Recommendations: Oral Care BID/PRN, Toothbrush (04/29/24 1110)                                        Plan of Care Reviewed With: patient, daughter      SWALLOW EVALUATION (Last 72 Hours)       SLP Adult Swallow Evaluation       Row Name 04/29/24 1110                   Rehab Evaluation    Document Type evaluation  -AC        Subjective Information no complaints  -AC        Patient Observations alert;cooperative  -AC        Patient/Family/Caregiver Comments/Observations Dtr present.  -AC        Patient Effort good  -AC        Oral Care dental appliance cleaned  -AC           General Information    Patient Profile Reviewed yes  -AC        Pertinent History Of Current Problem Afib w/ RVR. Consulted 2' pt c/o difficulty swallowing pills & regurgitation w/ liq. Reported sxs have been intermittent  x3 weeks. Has undergone swallow testing in past @ OSH, but cannot recall which study or findings/recs. Scheduled to see GI early May. Hx GERD, OA, SALIMA, COPD.  -AC        Current Method of Nutrition regular textures;thin liquids  -AC        Precautions/Limitations, Vision WFL;for purposes of eval  -AC        Precautions/Limitations, Hearing WFL;for purposes of eval  [place], but grossly WFL w/ increased volume  -AC        Prior Level of Function-Swallowing no diet consistency restrictions;esophageal concerns  -AC        Plans/Goals Discussed with patient and family;agreed upon  -AC        Barriers to Rehab none identified  -AC        Patient's Goals for Discharge eat/drink without coughing/choking  -AC        Family Goals for Discharge family did not state  -AC           Pain    Additional Documentation Pain Scale: Numbers Pre/Post-Treatment (Group)  -AC           Pain Scale: Numbers Pre/Post-Treatment    Pretreatment Pain Rating 0/10 - no pain  -AC        Posttreatment Pain Rating 0/10 - no pain  -AC           Oral Motor Structure and Function    Dentition Assessment upper dentures/partial in place;lower dentures/partial in place  -AC        Secretion Management WNL/WFL  -AC        Mucosal Quality moist, healthy  -AC           Oral Musculature and Cranial Nerve Assessment    Oral Motor General Assessment WFL  -AC           General Eating/Swallowing Observations    Respiratory Support Currently in Use nasal cannula  -AC        O2 Liters 3L  -AC        Eating/Swallowing Skills self-fed;appropriate self-feeding skills observed  -AC        Positioning During Eating upright in bed  -AC        Utensils Used spoon;cup;straw  prefers drinking w/o straw  -AC        Consistencies Trialed thin liquids;pureed;regular textures  -AC           Clinical Swallow Eval    Oral Prep Phase WFL  -AC        Oral Transit WFL  -AC        Oral Residue WFL  -AC        Pharyngeal Phase suspected pharyngeal impairment  -AC        Esophageal Phase  "suspected esophageal impairment  -           Pharyngeal Phase Concerns    Pharyngeal Phase Concerns other (see comments)  -           Esophageal Phase Concerns    Esophageal Phase Concerns sensation of material sticking  -        Sensation of Material Sticking regular consistencies;thin  -AC        Esophageal Phase Concerns, Comment Pt c/o sticking sensation (R side of throat + substernally) and paused a few times during eval for \"reflux.\" No overt clinical s/sxs aspiration, but generally @ risk given complaints/hx. Pt would like to proceed w/ MBS to further assess swallowing. Feels that sxs affect his intake.  -           Swallowing Quality of Life Assessment    Education and counseling provided Signs of aspiration;Risks of aspiration;Aspiration precautions  -           SLP Evaluation Clinical Impression    SLP Swallowing Diagnosis R/O pharyngeal dysphagia;esophageal dysphagia  -        Functional Impact risk of aspiration/pneumonia;risk of dehydration;risk of malnutrition  -        Rehab Potential/Prognosis, Swallowing good, to achieve stated therapy goals  -        Swallow Criteria for Skilled Therapeutic Interventions Met demonstrates skilled criteria  -           Recommendations    SLP Diet Recommendation soft to chew textures;whole;thin liquids  extra sauce/gravy  -        Recommended Diagnostics VFSS (MBS);with esophageal screen  -        Recommended Precautions and Strategies upright posture during/after eating;general aspiration precautions;reflux precautions  -        Oral Care Recommendations Oral Care BID/PRN;Toothbrush  -        SLP Rec. for Method of Medication Administration meds whole;with puree;as tolerated  one @ a time  -        Monitor for Signs of Aspiration yes;notify SLP if any concerns  -        Anticipated Discharge Disposition (SLP) home with OP services  -                  User Key  (r) = Recorded By, (t) = Taken By, (c) = Cosigned By      Initials Name " Effective Dates    Amanda Alfredo MS CCC-SLP 02/03/23 -                     EDUCATION  The patient has been educated in the following areas:   Dysphagia (Swallowing Impairment) Modified Diet Instruction.              Time Calculation:    Time Calculation- SLP       Row Name 04/29/24 1329             Time Calculation- SLP    SLP Start Time 1110  -AC      SLP Received On 04/29/24  -AC         Untimed Charges    32565-CN Eval Oral Pharyng Swallow Minutes 66  -AC         Total Minutes    Untimed Charges Total Minutes 66  -AC       Total Minutes 66  -AC                User Key  (r) = Recorded By, (t) = Taken By, (c) = Cosigned By      Initials Name Provider Type    Amanda Alfredo MS CCC-SLP Speech and Language Pathologist                    Therapy Charges for Today       Code Description Service Date Service Provider Modifiers Qty    [id number]  ST EVAL ORAL PHARYNG SWALLOW 4 4/29/2024 Amanda Toth MS CCC-SLP GN 1                 Amanda Toth MS CCC-SLP  4/29/2024

## 2024-04-30 ENCOUNTER — APPOINTMENT (OUTPATIENT)
Dept: CARDIOLOGY | Facility: HOSPITAL | Age: 79
DRG: 243 | End: 2024-04-30
Payer: MEDICARE

## 2024-04-30 LAB
ANION GAP SERPL CALCULATED.3IONS-SCNC: 12 MMOL/L (ref 5–15)
BUN SERPL-MCNC: 14 MG/DL (ref 8–23)
BUN/CREAT SERPL: 13 (ref 7–25)
CALCIUM SPEC-SCNC: 8.8 MG/DL (ref 8.6–10.5)
CHLORIDE SERPL-SCNC: 100 MMOL/L (ref 98–107)
CO2 SERPL-SCNC: 26 MMOL/L (ref 22–29)
CREAT SERPL-MCNC: 1.08 MG/DL (ref 0.76–1.27)
EGFRCR SERPLBLD CKD-EPI 2021: 70.7 ML/MIN/1.73
GLUCOSE BLDC GLUCOMTR-MCNC: 101 MG/DL (ref 70–130)
GLUCOSE BLDC GLUCOMTR-MCNC: 112 MG/DL (ref 70–130)
GLUCOSE BLDC GLUCOMTR-MCNC: 119 MG/DL (ref 70–130)
GLUCOSE BLDC GLUCOMTR-MCNC: 96 MG/DL (ref 70–130)
GLUCOSE SERPL-MCNC: 104 MG/DL (ref 65–99)
MAGNESIUM SERPL-MCNC: 2.1 MG/DL (ref 1.6–2.4)
POTASSIUM SERPL-SCNC: 4 MMOL/L (ref 3.5–5.2)
QT INTERVAL: 388 MS
QT INTERVAL: 394 MS
QT INTERVAL: 422 MS
QT INTERVAL: 488 MS
QT INTERVAL: 500 MS
QTC INTERVAL: 530 MS
QTC INTERVAL: 557 MS
QTC INTERVAL: 570 MS
QTC INTERVAL: 570 MS
QTC INTERVAL: 576 MS
SODIUM SERPL-SCNC: 138 MMOL/L (ref 136–145)

## 2024-04-30 PROCEDURE — 94799 UNLISTED PULMONARY SVC/PX: CPT

## 2024-04-30 PROCEDURE — 93010 ELECTROCARDIOGRAM REPORT: CPT | Performed by: STUDENT IN AN ORGANIZED HEALTH CARE EDUCATION/TRAINING PROGRAM

## 2024-04-30 PROCEDURE — 93005 ELECTROCARDIOGRAM TRACING: CPT

## 2024-04-30 PROCEDURE — 80048 BASIC METABOLIC PNL TOTAL CA: CPT

## 2024-04-30 PROCEDURE — 5A2204Z RESTORATION OF CARDIAC RHYTHM, SINGLE: ICD-10-PCS | Performed by: INTERNAL MEDICINE

## 2024-04-30 PROCEDURE — 94664 DEMO&/EVAL PT USE INHALER: CPT

## 2024-04-30 PROCEDURE — 93005 ELECTROCARDIOGRAM TRACING: CPT | Performed by: STUDENT IN AN ORGANIZED HEALTH CARE EDUCATION/TRAINING PROGRAM

## 2024-04-30 PROCEDURE — 82948 REAGENT STRIP/BLOOD GLUCOSE: CPT

## 2024-04-30 PROCEDURE — 93010 ELECTROCARDIOGRAM REPORT: CPT | Performed by: INTERNAL MEDICINE

## 2024-04-30 PROCEDURE — 93005 ELECTROCARDIOGRAM TRACING: CPT | Performed by: INTERNAL MEDICINE

## 2024-04-30 PROCEDURE — 83735 ASSAY OF MAGNESIUM: CPT

## 2024-04-30 PROCEDURE — 25010000002 FENTANYL CITRATE (PF) 50 MCG/ML SOLUTION: Performed by: INTERNAL MEDICINE

## 2024-04-30 PROCEDURE — 99232 SBSQ HOSP IP/OBS MODERATE 35: CPT

## 2024-04-30 PROCEDURE — 92960 CARDIOVERSION ELECTRIC EXT: CPT | Performed by: INTERNAL MEDICINE

## 2024-04-30 PROCEDURE — 92960 CARDIOVERSION ELECTRIC EXT: CPT

## 2024-04-30 RX ORDER — FENTANYL CITRATE 50 UG/ML
INJECTION, SOLUTION INTRAMUSCULAR; INTRAVENOUS
Status: COMPLETED | OUTPATIENT
Start: 2024-04-30 | End: 2024-04-30

## 2024-04-30 RX ORDER — MIDAZOLAM HYDROCHLORIDE 1 MG/ML
2-8 INJECTION INTRAMUSCULAR; INTRAVENOUS ONCE AS NEEDED
Status: DISCONTINUED | OUTPATIENT
Start: 2024-04-30 | End: 2024-05-03 | Stop reason: HOSPADM

## 2024-04-30 RX ORDER — DOFETILIDE 0.12 MG/1
125 CAPSULE ORAL EVERY 12 HOURS
Status: DISCONTINUED | OUTPATIENT
Start: 2024-04-30 | End: 2024-05-03

## 2024-04-30 RX ORDER — ETOMIDATE 2 MG/ML
INJECTION INTRAVENOUS
Status: COMPLETED | OUTPATIENT
Start: 2024-04-30 | End: 2024-04-30

## 2024-04-30 RX ORDER — ETOMIDATE 2 MG/ML
0.1 INJECTION INTRAVENOUS ONCE
Status: DISCONTINUED | OUTPATIENT
Start: 2024-04-30 | End: 2024-05-02

## 2024-04-30 RX ORDER — FLUMAZENIL 0.1 MG/ML
0.5 INJECTION INTRAVENOUS ONCE AS NEEDED
Status: DISCONTINUED | OUTPATIENT
Start: 2024-04-30 | End: 2024-05-03 | Stop reason: HOSPADM

## 2024-04-30 RX ORDER — NALOXONE HCL 0.4 MG/ML
0.4 VIAL (ML) INJECTION ONCE AS NEEDED
Status: DISCONTINUED | OUTPATIENT
Start: 2024-04-30 | End: 2024-05-03 | Stop reason: HOSPADM

## 2024-04-30 RX ORDER — ACETAMINOPHEN 325 MG/1
650 TABLET ORAL EVERY 6 HOURS PRN
Status: DISCONTINUED | OUTPATIENT
Start: 2024-04-30 | End: 2024-05-02

## 2024-04-30 RX ORDER — FENTANYL CITRATE 50 UG/ML
50-100 INJECTION, SOLUTION INTRAMUSCULAR; INTRAVENOUS ONCE AS NEEDED
Status: DISCONTINUED | OUTPATIENT
Start: 2024-04-30 | End: 2024-05-03 | Stop reason: HOSPADM

## 2024-04-30 RX ADMIN — PANTOPRAZOLE SODIUM 40 MG: 40 TABLET, DELAYED RELEASE ORAL at 05:40

## 2024-04-30 RX ADMIN — BUSPIRONE HYDROCHLORIDE 10 MG: 10 TABLET ORAL at 09:17

## 2024-04-30 RX ADMIN — METFORMIN HYDROCHLORIDE 1500 MG: 500 TABLET, EXTENDED RELEASE ORAL at 17:27

## 2024-04-30 RX ADMIN — FENTANYL CITRATE 25 MCG: 50 INJECTION, SOLUTION INTRAMUSCULAR; INTRAVENOUS at 15:15

## 2024-04-30 RX ADMIN — SACUBITRIL AND VALSARTAN 1 TABLET: 49; 51 TABLET, FILM COATED ORAL at 21:29

## 2024-04-30 RX ADMIN — APIXABAN 5 MG: 5 TABLET, FILM COATED ORAL at 21:17

## 2024-04-30 RX ADMIN — FUROSEMIDE 40 MG: 40 TABLET ORAL at 21:19

## 2024-04-30 RX ADMIN — DOFETILIDE 125 MCG: 0.12 CAPSULE ORAL at 21:28

## 2024-04-30 RX ADMIN — BUDESONIDE AND FORMOTEROL FUMARATE DIHYDRATE 2 PUFF: 160; 4.5 AEROSOL RESPIRATORY (INHALATION) at 19:27

## 2024-04-30 RX ADMIN — MONTELUKAST 10 MG: 10 TABLET, FILM COATED ORAL at 21:18

## 2024-04-30 RX ADMIN — METOPROLOL TARTRATE 25 MG: 25 TABLET, FILM COATED ORAL at 09:17

## 2024-04-30 RX ADMIN — ETOMIDATE 6 MG: 20 INJECTION, SOLUTION INTRAVENOUS at 15:20

## 2024-04-30 RX ADMIN — POTASSIUM CHLORIDE 20 MEQ: 750 CAPSULE, EXTENDED RELEASE ORAL at 09:17

## 2024-04-30 RX ADMIN — DOFETILIDE 250 MCG: 0.25 CAPSULE ORAL at 09:17

## 2024-04-30 RX ADMIN — BUSPIRONE HYDROCHLORIDE 10 MG: 10 TABLET ORAL at 21:18

## 2024-04-30 RX ADMIN — METOPROLOL TARTRATE 25 MG: 25 TABLET, FILM COATED ORAL at 21:28

## 2024-04-30 RX ADMIN — APIXABAN 5 MG: 5 TABLET, FILM COATED ORAL at 09:17

## 2024-04-30 RX ADMIN — ASPIRIN 81 MG: 81 TABLET, COATED ORAL at 09:17

## 2024-04-30 RX ADMIN — ACETAMINOPHEN 650 MG: 325 TABLET ORAL at 21:34

## 2024-04-30 RX ADMIN — SERTRALINE 100 MG: 100 TABLET, FILM COATED ORAL at 21:18

## 2024-04-30 RX ADMIN — BUDESONIDE AND FORMOTEROL FUMARATE DIHYDRATE 2 PUFF: 160; 4.5 AEROSOL RESPIRATORY (INHALATION) at 08:27

## 2024-04-30 RX ADMIN — TIOTROPIUM BROMIDE INHALATION SPRAY 2 PUFF: 3.12 SPRAY, METERED RESPIRATORY (INHALATION) at 08:27

## 2024-04-30 RX ADMIN — FUROSEMIDE 40 MG: 40 TABLET ORAL at 09:17

## 2024-04-30 RX ADMIN — ETOMIDATE 6 MG: 20 INJECTION, SOLUTION INTRAVENOUS at 15:15

## 2024-04-30 RX ADMIN — SACUBITRIL AND VALSARTAN 1 TABLET: 49; 51 TABLET, FILM COATED ORAL at 09:17

## 2024-04-30 RX ADMIN — ATORVASTATIN CALCIUM 80 MG: 40 TABLET, FILM COATED ORAL at 09:17

## 2024-04-30 NOTE — SIGNIFICANT NOTE
04/30/24 0827   SLP Deferred Reason   SLP Deferred Reason Patient unavailable for evaluation  (Spoke to RN who reported pt NPO for cardioversion scheduled for 1500. Will reschedule MBS w/ esophageal screen for tomorrow.)

## 2024-04-30 NOTE — CASE MANAGEMENT/SOCIAL WORK
Continued Stay Note  [facility]     Patient Name: Kerrie Medrano  MRN: [id number]  Today's Date: 4/30/2024    Admit Date: 4/29/2024    Plan: IDP   Discharge Plan       Row Name 04/30/24 1351       Plan    Plan IDP    Patient/Family in Agreement with Plan yes    Plan Comments  spoke with Mr. Medrano, at the bedside. Pt is here for Tikosyn and Cardioversion. Pt states he is being Cardioverted this afternoon. Pt lives alone [place] [facility]. Independent with ADL's, still drives. No DME/HH/OPPT. D/C plan is home. Pt states that his daughter or JUSTIN will transport him at discharge. No needs identified or voiced. PCP-Puneet Lopez. Confirmed [employer] Medicare and prescriptions filled at [facility].  will continue to follow.    Final Discharge Disposition Code 01 - home or self-care                   Discharge Codes    No documentation.                       Xochitl Wilkins RN

## 2024-04-30 NOTE — PROGRESS NOTES
"  [facility]  CARDIAC EP PROGRESS NOTE    Date of Admission: 4/29/2024  Date of Service: 04/30/24    Primary Care Physician: Jermaine Lopez MD    Chief Complaint: Atrial Flutter, Atrial Fibrillation      Subjective      HPI: Patient has been in a fast atrial flutter at around 130 bpm since around midnight. Denies feeling any different. Has received one dose of Tikosyn      Objective   Vitals: /97   Pulse (!) 129   Temp 98 °F (36.7 °C) (Oral)   Resp 20   Ht 180.3 cm (71\")   Wt 125 kg (276 lb)   SpO2 95%   BMI 38.49 kg/m²     Physical Exam:   GENERAL: Alert, cooperative, in no acute distress.   HEART: Regular rate and rhythm; no murmurs, rubs or gallops  LUNGS: Diminished bases, prolonged expiration.  Nonlabored breathing on 3 L/min  NEUROLOGIC: No gross focal abnormalities  EXTREMITIES: No obvious deformities, cyanosis, or edema noted.   PSYCH: normal mood, behavior    Results:      Results from last 7 days   Lab Units 04/30/24  0422 04/29/24  1109   SODIUM mmol/L 138 139   POTASSIUM mmol/L 4.0 3.7   CHLORIDE mmol/L 100 102   CO2 mmol/L 26.0 22.0   BUN mg/dL 14 15   CREATININE mg/dL 1.08 1.07   GLUCOSE mg/dL 104* 131*      Lab Results   Component Value Date    TRIG 114 10/10/2023    HDL 49 10/10/2023     (H) 10/10/2023    AST 20 10/10/2023    ALT 19 10/10/2023                                     Intake/Output Summary (Last 24 hours) at 4/30/2024 0754  Last data filed at 4/30/2024 0642  Gross per 24 hour   Intake --   Output 100 ml   Net -100 ml       I personally reviewed the patient's EKG/Telemetry data    Radiology Data:   No radiology results for the last 7 days      Current Medications:  apixaban, 5 mg, Oral, BID  aspirin, 81 mg, Oral, Daily  atorvastatin, 80 mg, Oral, Daily  budesonide-formoterol, 2 puff, Inhalation, BID - RT   And  tiotropium bromide monohydrate, 2 puff, Inhalation, Daily - RT  busPIRone, 10 mg, Oral, Q12H  dofetilide, 250 mcg, " Oral, Q12H  furosemide, 40 mg, Oral, BID  insulin lispro, 2-7 Units, Subcutaneous, 4x Daily AC & at Bedtime  metFORMIN ER, 1,500 mg, Oral, Daily With Dinner  metoprolol tartrate, 25 mg, Oral, Q12H  montelukast, 10 mg, Oral, Nightly  pantoprazole, 40 mg, Oral, Q AM  potassium chloride, 20 mEq, Oral, Daily  sacubitril-valsartan, 1 tablet, Oral, BID  sertraline, 100 mg, Oral, BID      Pharmacy Consult,          Assessment:   Persistent Atrial Fibrillation, Hx of Atrial Flutter  Diagnosis 10/2023 although unknown duration prior to that  Palpitations, fatigue and worsened dyspnea when out of rhythm  SONIA/ECV 11/2023 at FF rest LVEF 50%  Failed sotalol. Not good catheter ablation candidate s/t BMI 38 with COPD and other comorbidities  Nonobstructive CAD  Asa 81, Lipitor 80  HTN  HLD  Lipitor 80  COPD on 3 L/min  SALIMA  Type 2 Diabetes  4/2024 A1c 7.1  Metformin 1500 qd with Dinner         Plan:   QTc manually measured at 459 using Faison formula due to HR >120 bpm. Continue Tikosyn 250 mcg bid.   Will try and set up ECV today with persistent atrial flutter circuit ~130 bpm since 0030 this morning  Will need to watch closely with patient on Zoloft at home. Will decrease his Zoloft to 100 mg bid  Will continue home PO diabetes medications and add SSI with ACHS fsbg  Need to use CPAP    Electronically signed by Bart Barajas PA-C, 04/30/24, 9:03 AM EDT.

## 2024-04-30 NOTE — PROCEDURES
ECV note    Discussed risk and benefits.  Has been consistent with eliquis.  Sedated with 0.75 mg/kg of etomidate.  25 mg of fentanyl.  200 joules biphasic synced cardioversion.  Successful.

## 2024-05-01 ENCOUNTER — APPOINTMENT (OUTPATIENT)
Dept: GENERAL RADIOLOGY | Facility: HOSPITAL | Age: 79
DRG: 243 | End: 2024-05-01
Payer: MEDICARE

## 2024-05-01 ENCOUNTER — APPOINTMENT (OUTPATIENT)
Dept: CARDIOLOGY | Facility: HOSPITAL | Age: 79
DRG: 243 | End: 2024-05-01
Payer: MEDICARE

## 2024-05-01 LAB
ANION GAP SERPL CALCULATED.3IONS-SCNC: 12 MMOL/L (ref 5–15)
ASCENDING AORTA: 4 CM
BH CV ECHO MEAS - AO MAX PG: 2.8 MMHG
BH CV ECHO MEAS - AO MEAN PG: 1.7 MMHG
BH CV ECHO MEAS - AO ROOT DIAM: 4.1 CM
BH CV ECHO MEAS - AO V2 MAX: 83.8 CM/SEC
BH CV ECHO MEAS - AO V2 VTI: 18.6 CM
BH CV ECHO MEAS - AVA(I,D): 2.6 CM2
BH CV ECHO MEAS - EDV(CUBED): 87.2 ML
BH CV ECHO MEAS - EDV(MOD-SP2): 90.6 ML
BH CV ECHO MEAS - EDV(MOD-SP4): 93 ML
BH CV ECHO MEAS - EF(MOD-BP): 52.8 %
BH CV ECHO MEAS - EF(MOD-SP2): 52 %
BH CV ECHO MEAS - EF(MOD-SP4): 55.5 %
BH CV ECHO MEAS - ESV(CUBED): 36.3 ML
BH CV ECHO MEAS - ESV(MOD-SP2): 43.5 ML
BH CV ECHO MEAS - ESV(MOD-SP4): 41.4 ML
BH CV ECHO MEAS - FS: 25.3 %
BH CV ECHO MEAS - IVS/LVPW: 1 CM
BH CV ECHO MEAS - IVSD: 1.08 CM
BH CV ECHO MEAS - LA DIMENSION: 4.9 CM
BH CV ECHO MEAS - LAT PEAK E' VEL: 8.5 CM/SEC
BH CV ECHO MEAS - LV MASS(C)D: 166.1 GRAMS
BH CV ECHO MEAS - LV MAX PG: 2.22 MMHG
BH CV ECHO MEAS - LV MEAN PG: 1.24 MMHG
BH CV ECHO MEAS - LV V1 MAX: 74.6 CM/SEC
BH CV ECHO MEAS - LV V1 VTI: 14.8 CM
BH CV ECHO MEAS - LVIDD: 4.4 CM
BH CV ECHO MEAS - LVIDS: 3.3 CM
BH CV ECHO MEAS - LVOT AREA: 3.2 CM2
BH CV ECHO MEAS - LVOT DIAM: 2.02 CM
BH CV ECHO MEAS - LVPWD: 1.08 CM
BH CV ECHO MEAS - MED PEAK E' VEL: 6.4 CM/SEC
BH CV ECHO MEAS - MV A MAX VEL: 57.4 CM/SEC
BH CV ECHO MEAS - MV DEC SLOPE: 258.3 CM/SEC2
BH CV ECHO MEAS - MV DEC TIME: 0.19 SEC
BH CV ECHO MEAS - MV E MAX VEL: 71.6 CM/SEC
BH CV ECHO MEAS - MV E/A: 1.25
BH CV ECHO MEAS - MV MAX PG: 1.59 MMHG
BH CV ECHO MEAS - MV MEAN PG: 0.91 MMHG
BH CV ECHO MEAS - MV P1/2T: 71.4 MSEC
BH CV ECHO MEAS - MV V2 VTI: 28.2 CM
BH CV ECHO MEAS - MVA(P1/2T): 3.1 CM2
BH CV ECHO MEAS - MVA(VTI): 1.68 CM2
BH CV ECHO MEAS - PA ACC TIME: 0.11 SEC
BH CV ECHO MEAS - RAP SYSTOLE: 8 MMHG
BH CV ECHO MEAS - RVSP: 26 MMHG
BH CV ECHO MEAS - SV(LVOT): 47.4 ML
BH CV ECHO MEAS - SV(MOD-SP2): 47.1 ML
BH CV ECHO MEAS - SV(MOD-SP4): 51.6 ML
BH CV ECHO MEAS - TAPSE (>1.6): 2.36 CM
BH CV ECHO MEAS - TR MAX PG: 18.2 MMHG
BH CV ECHO MEAS - TR MAX VEL: 201.9 CM/SEC
BH CV ECHO MEASUREMENTS AVERAGE E/E' RATIO: 9.61
BH CV XLRA - RV BASE: 4 CM
BH CV XLRA - RV LENGTH: 7.1 CM
BH CV XLRA - RV MID: 3.1 CM
BH CV XLRA - TDI S': 12.8 CM/SEC
BUN SERPL-MCNC: 19 MG/DL (ref 8–23)
BUN/CREAT SERPL: 18.1 (ref 7–25)
CALCIUM SPEC-SCNC: 8.7 MG/DL (ref 8.6–10.5)
CHLORIDE SERPL-SCNC: 101 MMOL/L (ref 98–107)
CO2 SERPL-SCNC: 26 MMOL/L (ref 22–29)
CREAT SERPL-MCNC: 1.05 MG/DL (ref 0.76–1.27)
EGFRCR SERPLBLD CKD-EPI 2021: 73.1 ML/MIN/1.73
GLUCOSE BLDC GLUCOMTR-MCNC: 103 MG/DL (ref 70–130)
GLUCOSE BLDC GLUCOMTR-MCNC: 103 MG/DL (ref 70–130)
GLUCOSE BLDC GLUCOMTR-MCNC: 120 MG/DL (ref 70–130)
GLUCOSE BLDC GLUCOMTR-MCNC: 142 MG/DL (ref 70–130)
GLUCOSE SERPL-MCNC: 91 MG/DL (ref 65–99)
LEFT ATRIUM VOLUME INDEX: 35 ML/M2
MAGNESIUM SERPL-MCNC: 1.9 MG/DL (ref 1.6–2.4)
POTASSIUM SERPL-SCNC: 4.5 MMOL/L (ref 3.5–5.2)
QT INTERVAL: 536 MS
QTC INTERVAL: 561 MS
SODIUM SERPL-SCNC: 139 MMOL/L (ref 136–145)

## 2024-05-01 PROCEDURE — 99232 SBSQ HOSP IP/OBS MODERATE 35: CPT

## 2024-05-01 PROCEDURE — 93010 ELECTROCARDIOGRAM REPORT: CPT | Performed by: STUDENT IN AN ORGANIZED HEALTH CARE EDUCATION/TRAINING PROGRAM

## 2024-05-01 PROCEDURE — 82948 REAGENT STRIP/BLOOD GLUCOSE: CPT

## 2024-05-01 PROCEDURE — 93010 ELECTROCARDIOGRAM REPORT: CPT | Performed by: INTERNAL MEDICINE

## 2024-05-01 PROCEDURE — 94664 DEMO&/EVAL PT USE INHALER: CPT

## 2024-05-01 PROCEDURE — 97161 PT EVAL LOW COMPLEX 20 MIN: CPT

## 2024-05-01 PROCEDURE — 25010000002 MAGNESIUM SULFATE 4 GM/100ML SOLUTION: Performed by: STUDENT IN AN ORGANIZED HEALTH CARE EDUCATION/TRAINING PROGRAM

## 2024-05-01 PROCEDURE — 97165 OT EVAL LOW COMPLEX 30 MIN: CPT

## 2024-05-01 PROCEDURE — 93005 ELECTROCARDIOGRAM TRACING: CPT

## 2024-05-01 PROCEDURE — 80048 BASIC METABOLIC PNL TOTAL CA: CPT

## 2024-05-01 PROCEDURE — 74240 X-RAY XM UPR GI TRC 1CNTRST: CPT

## 2024-05-01 PROCEDURE — 83735 ASSAY OF MAGNESIUM: CPT

## 2024-05-01 PROCEDURE — 93005 ELECTROCARDIOGRAM TRACING: CPT | Performed by: STUDENT IN AN ORGANIZED HEALTH CARE EDUCATION/TRAINING PROGRAM

## 2024-05-01 PROCEDURE — 25010000002 SULFUR HEXAFLUORIDE MICROSPH 60.7-25 MG RECONSTITUTED SUSPENSION

## 2024-05-01 PROCEDURE — 74230 X-RAY XM SWLNG FUNCJ C+: CPT

## 2024-05-01 PROCEDURE — 93306 TTE W/DOPPLER COMPLETE: CPT

## 2024-05-01 PROCEDURE — 94799 UNLISTED PULMONARY SVC/PX: CPT

## 2024-05-01 PROCEDURE — 93306 TTE W/DOPPLER COMPLETE: CPT | Performed by: INTERNAL MEDICINE

## 2024-05-01 PROCEDURE — 92611 MOTION FLUOROSCOPY/SWALLOW: CPT

## 2024-05-01 RX ORDER — MAGNESIUM SULFATE HEPTAHYDRATE 40 MG/ML
4 INJECTION, SOLUTION INTRAVENOUS ONCE
Status: COMPLETED | OUTPATIENT
Start: 2024-05-01 | End: 2024-05-01

## 2024-05-01 RX ADMIN — METOPROLOL TARTRATE 12.5 MG: 25 TABLET, FILM COATED ORAL at 21:35

## 2024-05-01 RX ADMIN — ASPIRIN 81 MG: 81 TABLET, COATED ORAL at 09:50

## 2024-05-01 RX ADMIN — SACUBITRIL AND VALSARTAN 1 TABLET: 49; 51 TABLET, FILM COATED ORAL at 21:55

## 2024-05-01 RX ADMIN — FUROSEMIDE 40 MG: 40 TABLET ORAL at 21:54

## 2024-05-01 RX ADMIN — SACUBITRIL AND VALSARTAN 1 TABLET: 49; 51 TABLET, FILM COATED ORAL at 09:50

## 2024-05-01 RX ADMIN — BARIUM SULFATE 20 ML: 400 PASTE ORAL at 14:34

## 2024-05-01 RX ADMIN — FUROSEMIDE 40 MG: 40 TABLET ORAL at 09:50

## 2024-05-01 RX ADMIN — SERTRALINE 100 MG: 100 TABLET, FILM COATED ORAL at 09:50

## 2024-05-01 RX ADMIN — DOFETILIDE 125 MCG: 0.12 CAPSULE ORAL at 09:50

## 2024-05-01 RX ADMIN — ATORVASTATIN CALCIUM 80 MG: 40 TABLET, FILM COATED ORAL at 09:50

## 2024-05-01 RX ADMIN — PANTOPRAZOLE SODIUM 40 MG: 40 TABLET, DELAYED RELEASE ORAL at 05:07

## 2024-05-01 RX ADMIN — DOFETILIDE 125 MCG: 0.12 CAPSULE ORAL at 21:54

## 2024-05-01 RX ADMIN — METOPROLOL TARTRATE 25 MG: 25 TABLET, FILM COATED ORAL at 09:50

## 2024-05-01 RX ADMIN — APIXABAN 5 MG: 5 TABLET, FILM COATED ORAL at 09:50

## 2024-05-01 RX ADMIN — BUDESONIDE AND FORMOTEROL FUMARATE DIHYDRATE 2 PUFF: 160; 4.5 AEROSOL RESPIRATORY (INHALATION) at 07:17

## 2024-05-01 RX ADMIN — TIOTROPIUM BROMIDE INHALATION SPRAY 2 PUFF: 3.12 SPRAY, METERED RESPIRATORY (INHALATION) at 07:17

## 2024-05-01 RX ADMIN — MAGNESIUM SULFATE IN WATER FOR 4 G: 40 INJECTION INTRAVENOUS at 09:50

## 2024-05-01 RX ADMIN — MONTELUKAST 10 MG: 10 TABLET, FILM COATED ORAL at 21:55

## 2024-05-01 RX ADMIN — BARIUM SULFATE 100 ML: 0.81 POWDER, FOR SUSPENSION ORAL at 14:34

## 2024-05-01 RX ADMIN — BUSPIRONE HYDROCHLORIDE 10 MG: 10 TABLET ORAL at 21:54

## 2024-05-01 RX ADMIN — BUSPIRONE HYDROCHLORIDE 10 MG: 10 TABLET ORAL at 09:50

## 2024-05-01 RX ADMIN — SULFUR HEXAFLUORIDE 3 ML: KIT at 17:20

## 2024-05-01 RX ADMIN — METFORMIN HYDROCHLORIDE 1500 MG: 500 TABLET, EXTENDED RELEASE ORAL at 18:47

## 2024-05-01 RX ADMIN — SERTRALINE 100 MG: 100 TABLET, FILM COATED ORAL at 21:54

## 2024-05-01 RX ADMIN — POTASSIUM CHLORIDE 20 MEQ: 750 CAPSULE, EXTENDED RELEASE ORAL at 09:50

## 2024-05-01 NOTE — PLAN OF CARE
Goal Outcome Evaluation:  Plan of Care Reviewed With: patient           Outcome Evaluation: PT eval complete. Pt presents near baseline for strength, balance, and functional mobility; mild deficits noted in endurance however pt remains ind w/ functional mobility. Pt amb 200' w/ SPC SBA. No skilled PT interventions warranted, PT signing off. PT rec home at d/c.      Anticipated Discharge Disposition (PT): home

## 2024-05-01 NOTE — THERAPY EVALUATION
Patient Name: Kerrie Medrano  : 1945    MRN: [id number]                              Today's Date: 2024       Admit Date: 2024    Visit Dx:     ICD-10-CM ICD-9-CM   1. Atrial fibrillation with RVR  I48.91 427.31   2. Dysphagia, unspecified type  R13.10 787.20     Patient Active Problem List   Diagnosis    Primary hypertension    Mixed hyperlipidemia    Anxiety and depression    Persistent insomnia    Coronary artery disease involving native coronary artery of native heart without angina pectoris    Chronic GERD    Primary osteoarthritis involving multiple joints    General medical exam    Class 2 severe obesity due to excess calories with serious comorbidity and body mass index (BMI) of 39.0 to 39.9 in adult    Need for hepatitis C screening test    Screening for colon cancer    Prostate cancer screening    Type 2 diabetes mellitus without complication, without long-term current use of insulin    Chronic pain of right knee    OA (osteoarthritis) of knee    Arthritis of right knee    Pre-operative cardiovascular examination    Urinary frequency    Abnormal EKG    Atrial fibrillation with RVR    Shortness of breath    RBBB    Acute HFrEF (heart failure with reduced ejection fraction)    Moderate persistent asthma without complication    SALIMA and COPD overlap syndrome    Groin pain    Atrial fibrillation, persistent     Past Medical History:   Diagnosis Date    Benign essential hypertension     Disorder of hair and hair follicles     Joint pain      Past Surgical History:   Procedure Laterality Date    APPENDECTOMY  1960    CARDIAC CATHETERIZATION      2023 PER DR. ARENAS    CARDIAC CATHETERIZATION Left 2023    Procedure: Cardiac Catheterization/Vascular Study;  Surgeon: Glen Arenas MD;  Location: [facility] INVASIVE LOCATION;  Service: Cardiovascular;  Laterality: Left;    COLONOSCOPY  2009    COLON POLYP-20 CM INFLAMMATORY POLYP, PLAN: REPEAT IN 5 YEARS, NO FH OF COLON CANCER     KNEE SURGERY      ARTHROCENTESIS OF THE RIGHT KNEE MEDIAL JOINT LINE  AND LEFT KNEE    SKIN CANCER EXCISION        General Information       Row Name 05/01/24 1047          OT Time and Intention    Document Type discharge evaluation/summary  -CS     Mode of Treatment occupational therapy  -       Row Name 05/01/24 1047          General Information    Patient Profile Reviewed yes  -CS     Existing Precautions/Restrictions cardiac  -CS     Barriers to Rehab medically complex  -       Row Name 05/01/24 1047          Living Environment    People in Home alone  -       Row Name 05/01/24 1047          Home Main Entrance    Number of Stairs, Main Entrance none;other (see comments)  ramp to enter  -       Row Name 05/01/24 1047          Cognition    Orientation Status (Cognition) oriented x 3  -CS       Row Name 05/01/24 1047          Safety Issues, Functional Mobility    Safety Issues Affecting Function (Mobility) insight into deficits/self-awareness;safety precaution awareness;safety precautions follow-through/compliance  -     Impairments Affecting Function (Mobility) endurance/activity tolerance;balance  -CS               User Key  (r) = Recorded By, (t) = Taken By, (c) = Cosigned By      Initials Name Provider Type    CS Tyrell Baca, OT Occupational Therapist                     Mobility/ADL's       Row Name 05/01/24 1048          Bed Mobility    Comment, (Bed Mobility) UIC, returned to chair  -[facility] Name 05/01/24 1048          Transfers    Transfers sit-stand transfer;stand-sit transfer  -     Comment, (Transfers) no assist, SPC for support, no dizziness reported  -       Row Name 05/01/24 1048          Sit-Stand Transfer    Sit-Stand [place] (Transfers) independent  -[facility] Name 05/01/24 1048          Stand-Sit Transfer    Stand-Sit [place] (Transfers) independent  -[facility] Name 05/01/24 1048          Functional Mobility    Functional Mobility- Comment defer to PT for  specifics  -[facility] Name 05/01/24 1048          Activities of Daily Living    BADL Assessment/Intervention lower body dressing;grooming;toileting  -[facility] Name 05/01/24 1048          Lower Body Dressing Assessment/Training    [place] Level (Lower Body Dressing) don;standby assist;doff;shoes/slippers  -     Assistive Devices (Lower Body Dressing) sock-aid;reacher;long-handled shoe horn  -     Position (Lower Body Dressing) unsupported sitting  -CS     Comment, (Lower Body Dressing) reach to distal BLEs intact but unstable, issued LHS and reacher along with education on safe use  -       Row Name 05/01/24 1048          Grooming Assessment/Training    [place] Level (Grooming) grooming skills;supervision  -     Position (Grooming) sink side  -[facility] Name 05/01/24 1048          Toileting Assessment/Training    [place] (Toileting) toileting skills;independent  -CS               User Key  (r) = Recorded By, (t) = Taken By, (c) = Cosigned By      Initials Name Provider Type     Tyrell Baca OT Occupational Therapist                   Obj/Interventions       [facility] Name 05/01/24 1049          Sensory Assessment (Somatosensory)    Sensory Assessment (Somatosensory) UE sensation intact;bilateral UE  -CS     Bilateral UE Sensory Assessment general sensation;light touch localization;light touch awareness;intact  -[facility] Name 05/01/24 1049          Vision Assessment/Intervention    Visual Impairment/Limitations WFL;corrective lenses full-time  -[facility] Name 05/01/24 1049          Range of Motion Comprehensive    General Range of Motion no range of motion deficits identified  -[facility] Name 05/01/24 1049          Strength Comprehensive (MMT)    General Manual Muscle Testing (MMT) Assessment no strength deficits identified  -[facility] Name 05/01/24 1049          Motor Skills    Motor Skills coordination;functional endurance  -     Coordination bimanual skills;WFL   -CS     Functional Endurance moderate - SOA with elevated HR  -CS       Row Name 05/01/24 1049          Balance    Balance Assessment sitting static balance;sitting dynamic balance;standing dynamic balance;standing static balance  -CS     Static Sitting Balance independent  -CS     Dynamic Sitting Balance independent  -CS     Position, Sitting Balance unsupported;sitting in chair  -CS     Static Standing Balance independent  -CS     Dynamic Standing Balance supervision  -CS     Position/Device Used, Standing Balance supported;cane, straight  -CS     Comment, Balance no LOB this  -CS               User Key  (r) = Recorded By, (t) = Taken By, (c) = Cosigned By      Initials Name Provider Type    CS Tyrell Baca, OT Occupational Therapist                   Goals/Plan    No documentation.                  Clinical Impression       Row Name 05/01/24 1051          Pain Assessment    Pretreatment Pain Rating 0/10 - no pain  -CS     Posttreatment Pain Rating 0/10 - no pain  -CS       Row Name 05/01/24 1051          Plan of Care Review    Plan of Care Reviewed With patient  -CS     Progress no change  -CS     Outcome Evaluation Pt presents at baseline for ADL completion and related transfers, SOA with activity, O2 sats stable on RA. OT signing off, defer to PT for any further mobility needs. Rec d/c to home with medically appropriate.  -       Row Name 05/01/24 1051          Therapy Assessment/Plan (OT)    Criteria for Skilled Therapeutic Interventions Met (OT) no;does not meet criteria for skilled intervention  -     Therapy Frequency (OT) evaluation only  -       Row Name 05/01/24 1051          Vital Signs    Pre Systolic BP Rehab --  RN cleared for eval  -CS     O2 Delivery Pre Treatment room air  -CS     O2 Delivery Intra Treatment room air  -CS     O2 Delivery Post Treatment room air  -CS     Pre Patient Position Sitting  -CS     Intra Patient Position Standing  -CS     Post Patient Position Sitting  -CS        Row Name 05/01/24 1051          Positioning and Restraints    Pre-Treatment Position sitting in chair/recliner  -CS     Post Treatment Position chair  -CS     In Chair notified nsg;sitting;call light within reach;encouraged to call for assist  up ad dion per RN  -CS               User Key  (r) = Recorded By, (t) = Taken By, (c) = Cosigned By      Initials Name Provider Type    CS Tyrell Baca, OT Occupational Therapist                   Outcome Measures       Row Name 05/01/24 1116          How much help from another is currently needed...    Putting on and taking off regular lower body clothing? 3  -CS     Bathing (including washing, rinsing, and drying) 3  -CS     Toileting (which includes using toilet bed pan or urinal) 4  -CS     Putting on and taking off regular upper body clothing 4  -CS     Taking care of personal grooming (such as brushing teeth) 4  -CS     Eating meals 4  -CS     AM-PAC 6 Clicks Score (OT) 22  -CS       Row Name 05/01/24 0905          How much help from another person do you currently need...    Turning from your back to your side while in flat bed without using bedrails? 4  -KE     Moving from lying on back to sitting on the side of a flat bed without bedrails? 4  -KE     Moving to and from a bed to a chair (including a wheelchair)? 4  -KE     Standing up from a chair using your arms (e.g., wheelchair, bedside chair)? 4  -KE     Climbing 3-5 steps with a railing? 3  -KE     To walk in hospital room? 3  -KE     AM-PAC 6 Clicks Score (PT) 22  -KE     Highest Level of Mobility Goal 7 --> Walk 25 feet or more  -KE       Row Name 05/01/24 1116          Functional Assessment    Outcome Measure Options AM-PAC 6 Clicks Daily Activity (OT)  -CS               User Key  (r) = Recorded By, (t) = Taken By, (c) = Cosigned By      Initials Name Provider Type    Tyrell [place] OT Occupational Therapist    KE Nadine Pete, PT Physical Therapist                    Occupational Therapy  Education       Title: PT OT SLP Therapies (Done)       Topic: Occupational Therapy (Done)       Point: Precautions (Done)       Description:   Instruct learner(s) on prescribed precautions during self-care and functional transfers.                  Learning Progress Summary             Patient Acceptance, E,D, MARCY,DU by  at 5/1/2024 1117                                         User Key       Initials Effective Dates Name Provider Type Discipline     06/16/21 -  Tyrell Baca OT Occupational Therapist OT                  OT Recommendation and Plan  Therapy Frequency (OT): evaluation only  Plan of Care Review  Plan of Care Reviewed With: patient  Progress: no change  Outcome Evaluation: Pt presents at baseline for ADL completion and related transfers, SOA with activity, O2 sats stable on RA. OT signing off, defer to PT for any further mobility needs. Rec d/c to home with medically appropriate.     Time Calculation:   Evaluation Complexity (OT)  Review Occupational Profile/Medical/Therapy History Complexity: expanded/moderate complexity  Assessment, Occupational Performance/Identification of Deficit Complexity: 1-3 performance deficits  Clinical Decision Making Complexity (OT): problem focused assessment/low complexity  Overall Complexity of Evaluation (OT): low complexity     Time Calculation- OT       Row Name 05/01/24 1118             Time Calculation- OT    OT Start Time 1014  -CS      OT Received On 05/01/24  -CS         Untimed Charges    OT Eval/Re-eval Minutes 35  -CS         Total Minutes    Untimed Charges Total Minutes 35  -CS       Total Minutes 35  -CS                User Key  (r) = Recorded By, (t) = Taken By, (c) = Cosigned By      Initials Name Provider Type     Tyrell Baca OT Occupational Therapist                  Therapy Charges for Today       Code Description Service Date Service Provider Modifiers Qty    77416152845 HC OT EVAL LOW COMPLEXITY 3 5/1/2024 Tyrell Baca OT GO 1                  Tyrell Baca, OT  5/1/2024

## 2024-05-01 NOTE — THERAPY DISCHARGE NOTE
Patient Name: Kerrie Medrano  : 1945    MRN: [id number]                              Today's Date: 2024       Admit Date: 2024    Visit Dx:     ICD-10-CM ICD-9-CM   1. Atrial fibrillation with RVR  I48.91 427.31   2. Dysphagia, unspecified type  R13.10 787.20     Patient Active Problem List   Diagnosis    Primary hypertension    Mixed hyperlipidemia    Anxiety and depression    Persistent insomnia    Coronary artery disease involving native coronary artery of native heart without angina pectoris    Chronic GERD    Primary osteoarthritis involving multiple joints    General medical exam    Class 2 severe obesity due to excess calories with serious comorbidity and body mass index (BMI) of 39.0 to 39.9 in adult    Need for hepatitis C screening test    Screening for colon cancer    Prostate cancer screening    Type 2 diabetes mellitus without complication, without long-term current use of insulin    Chronic pain of right knee    OA (osteoarthritis) of knee    Arthritis of right knee    Pre-operative cardiovascular examination    Urinary frequency    Abnormal EKG    Atrial fibrillation with RVR    Shortness of breath    RBBB    Acute HFrEF (heart failure with reduced ejection fraction)    Moderate persistent asthma without complication    SALIMA and COPD overlap syndrome    Groin pain    Atrial fibrillation, persistent     Past Medical History:   Diagnosis Date    Benign essential hypertension     Disorder of hair and hair follicles     Joint pain      Past Surgical History:   Procedure Laterality Date    APPENDECTOMY  1960    CARDIAC CATHETERIZATION      2023 PER DR. ARENAS    CARDIAC CATHETERIZATION Left 2023    Procedure: Cardiac Catheterization/Vascular Study;  Surgeon: Glen Arenas MD;  Location: [facility] INVASIVE LOCATION;  Service: Cardiovascular;  Laterality: Left;    COLONOSCOPY  2009    COLON POLYP-20 CM INFLAMMATORY POLYP, PLAN: REPEAT IN 5 YEARS, NO FH OF COLON CANCER     KNEE SURGERY      ARTHROCENTESIS OF THE RIGHT KNEE MEDIAL JOINT LINE  AND LEFT KNEE    SKIN CANCER EXCISION        General Information       [facility] Name 05/01/24 0854          Physical Therapy Time and Intention    Document Type evaluation  -KE     Mode of Treatment physical therapy  -[facility] Name 05/01/24 0854          General Information    Patient Profile Reviewed yes  -KE     Prior Level of Function independent:;all household mobility;community mobility;gait;ADL's;driving;mod assist:;shopping  Ind w/ SPC, Dtr assists w/ grocery shopping  -KE     Existing Precautions/Restrictions cardiac  -KE     Barriers to Rehab medically complex;hearing deficit  -KE       [facility] Name 05/01/24 0854          Living Environment    People in Home alone  -[facility] Name 05/01/24 0854          Home Main Entrance    Number of Stairs, Main Entrance none  Ramp  -[facility] Name 05/01/24 0854          Stairs Within Home, Primary    Number of Stairs, Within Home, Primary none  -KE       Row Name 05/01/24 0854          Cognition    Orientation Status (Cognition) oriented x 3  -KE       Row Name 05/01/24 0854          Safety Issues, Functional Mobility    Safety Issues Affecting Function (Mobility) awareness of need for assistance;insight into deficits/self-awareness;safety precaution awareness  -     Impairments Affecting Function (Mobility) balance;endurance/activity tolerance;shortness of breath  -               User Key  (r) = Recorded By, (t) = Taken By, (c) = Cosigned By      Initials Name Provider Type    Nadine Yeboah, PT Physical Therapist                   Mobility       [facility] Name 05/01/24 0857          Bed Mobility    Comment, (Bed Mobility) pt standing in room Pre IE, sitting EOB post IE  -[facility] Name 05/01/24 0857          Sit-Stand Transfer    Sit-Stand [place] (Transfers) independent  -ANGLE     Comment, (Sit-Stand Transfer) x3 STS from EOB  -[facility] Name 05/01/24 0857          Gait/Stairs  (Locomotion)    [place] Level (Gait) standby assist  -KE     Assistive Device (Gait) cane, straight  -KE     Distance in Feet (Gait) 200  -KE     Deviations/Abnormal Patterns (Gait) base of support, wide;gait speed decreased;stride length decreased  -KE     Right Sided Gait Deviations weight shift ability decreased  -KE     Comment, (Gait/Stairs) Pt amb with step through gait pattern with slow gait speed, and wide PRANAV. Pt amb w/ SPC on R d/t R knee pain; PT educated on use of SPC on L for improved offloading and stability. Pt declining use of SPC on R despite education. No overt LOB noted.  -KE               User Key  (r) = Recorded By, (t) = Taken By, (c) = Cosigned By      Initials Name Provider Type    Nadine Yeboah PT Physical Therapist                   Obj/Interventions       Row Name 05/01/24 0900          Range of Motion Comprehensive    General Range of Motion no range of motion deficits identified  -       Row Name 05/01/24 0900          Strength Comprehensive (MMT)    General Manual Muscle Testing (MMT) Assessment no strength deficits identified  -       Row Name 05/01/24 0900          Balance    Balance Assessment sitting static balance;sitting dynamic balance;sit to stand dynamic balance;standing static balance;standing dynamic balance  -KE     Static Sitting Balance independent  -KE     Dynamic Sitting Balance independent  -KE     Position, Sitting Balance unsupported;sitting edge of bed  -KE     Sit to Stand Dynamic Balance independent  -KE     Static Standing Balance independent  -KE     Dynamic Standing Balance standby assist  -KE     Position/Device Used, Standing Balance unsupported;supported;cane, straight  -KE     Balance Interventions sitting;standing;sit to stand;supported;static;dynamic  -KE     Comment, Balance no overt LOB  -       Row Name 05/01/24 0900          Sensory Assessment (Somatosensory)    Sensory Assessment (Somatosensory) sensation intact  -                User Key  (r) = Recorded By, (t) = Taken By, (c) = Cosigned By      Initials Name Provider Type    Nadine Yeboah, PT Physical Therapist                   Goals/Plan    No documentation.                  Clinical Impression       Row Name 05/01/24 0900          Pain    Pretreatment Pain Rating 0/10 - no pain  -KE     Posttreatment Pain Rating 0/10 - no pain  -KE     Pain Intervention(s) Ambulation/increased activity;Repositioned  -KE       Row Name 05/01/24 0900          Plan of Care Review    Plan of Care Reviewed With patient  -KE     Outcome Evaluation PT eval complete. Pt presents near baseline for strength, balance, and functional mobility; mild deficits noted in endurance however pt remains ind w/ functional mobility. Pt amb 200' w/ SPC SBA. No skilled PT interventions warranted, PT signing off. PT rec home at d/c.  -       Row Name 05/01/24 0900          Therapy Assessment/Plan (PT)    Criteria for Skilled Interventions Met (PT) no problems identified which require skilled intervention  -KE     Therapy Frequency (PT) evaluation only  -KE       Row Name 05/01/24 0900          Vital Signs    Post Systolic BP Rehab 137  -KE     Post Treatment Diastolic BP 82  -KE     Posttreatment Heart Rate (beats/min) 64  -KE     Pre SpO2 (%) 96  -KE     O2 Delivery Pre Treatment supplemental O2  -KE     O2 Delivery Intra Treatment supplemental O2  -KE     Post SpO2 (%) 95  -KE     O2 Delivery Post Treatment supplemental O2  -KE     Pre Patient Position Standing  -KE     Intra Patient Position Standing  -KE     Post Patient Position Sitting  -KE       Row Name 05/01/24 0900          Positioning and Restraints    Pre-Treatment Position in bed  -KE     Post Treatment Position bed  -KE     In Bed notified nsg;encouraged to call for assist;call light within reach;sitting EOB  exit alarm status unchanged  -KE               User Key  (r) = Recorded By, (t) = Taken By, (c) = Cosigned By      Initials Name Provider Type    ANGLE  Nadine Pete, ALEXANDRA Physical Therapist                   Outcome Measures       Row Name 05/01/24 0905          How much help from another person do you currently need...    Turning from your back to your side while in flat bed without using bedrails? 4  -KE     Moving from lying on back to sitting on the side of a flat bed without bedrails? 4  -KE     Moving to and from a bed to a chair (including a wheelchair)? 4  -KE     Standing up from a chair using your arms (e.g., wheelchair, bedside chair)? 4  -KE     Climbing 3-5 steps with a railing? 3  -KE     To walk in hospital room? 3  -KE     AM-PAC 6 Clicks Score (PT) 22  -KE     Highest Level of Mobility Goal 7 --> Walk 25 feet or more  -KE               User Key  (r) = Recorded By, (t) = Taken By, (c) = Cosigned By      Initials Name Provider Type    Nadine Yeboah, ALEXANDRA Physical Therapist                                 Physical Therapy Education       Title: PT OT SLP Therapies (Done)       Topic: Physical Therapy (Done)       Point: Mobility training (Done)       Learning Progress Summary             Patient Acceptance, E, VU by ANGLE at 5/1/2024 0816                         Point: Home exercise program (Done)       Learning Progress Summary             Patient Acceptance, E, VU by ANGLE at 5/1/2024 0816                         Point: Body mechanics (Done)       Learning Progress Summary             Patient Acceptance, E, VU by ANGLE at 5/1/2024 0816                         Point: Precautions (Done)       Learning Progress Summary             Patient Acceptance, E, VU by ANGLE at 5/1/2024 0816                                         User Key       Initials Effective Dates Name Provider Type Discipline     11/16/23 -  Nadine Pete, ALEXANDRA Physical Therapist PT                  PT Recommendation and Plan     Plan of Care Reviewed With: patient  Outcome Evaluation: PT eval complete. Pt presents near baseline for strength, balance, and functional mobility; mild deficits  noted in endurance however pt remains ind w/ functional mobility. Pt amb 200' w/ SPC SBA. No skilled PT interventions warranted, PT signing off. PT rec home at d/c.     Time Calculation:   PT Evaluation Complexity  History, PT Evaluation Complexity: 1-2 personal factors and/or comorbidities  Examination of Body Systems (PT Eval Complexity): 1-2 elements  Clinical Presentation (PT Evaluation Complexity): stable  Clinical Decision Making (PT Evaluation Complexity): low complexity  Overall Complexity (PT Evaluation Complexity): low complexity     PT Charges       Row Name 05/01/24 0906             Time Calculation    Start Time 0816  -KE      PT Received On 05/01/24  -KE         Untimed Charges    PT Eval/Re-eval Minutes 49  -KE         Total Minutes    Untimed Charges Total Minutes 49  -KE       Total Minutes 49  -KE                User Key  (r) = Recorded By, (t) = Taken By, (c) = Cosigned By      Initials Name Provider Type    Nadine Yeboah, PT Physical Therapist                  Therapy Charges for Today       Code Description Service Date Service Provider Modifiers Qty    88774509132 HC PT EVAL LOW COMPLEXITY 4 5/1/2024 Nadine Pete PT GP 1            PT G-Codes  AM-PAC 6 Clicks Score (PT): 22  PT Discharge Summary  Anticipated Discharge Disposition (PT): home    Nadine Pete PT  5/1/2024

## 2024-05-01 NOTE — PLAN OF CARE
Goal Outcome Evaluation:  Plan of Care Reviewed With: patient                  Anticipated Discharge Disposition (SLP): No further SLP services warranted          SLP Swallowing Diagnosis: functional oral phase, functional pharyngeal phase, esophageal dysphagia, other (see comments) (per Radiology PA, limited upper GI revealed reflux) (05/01/24 4442)

## 2024-05-01 NOTE — CASE MANAGEMENT/SOCIAL WORK
Continued Stay Note  [facility]     Patient Name: Kerrie Medrano  MRN: [id number]  Today's Date: 5/1/2024    Admit Date: 4/29/2024    Plan: Home   Discharge Plan       Row Name 05/01/24 1107       Plan    Plan Home    Patient/Family in Agreement with Plan yes    Plan Comments Mr. Medrano is not being discharged today. He is having an echo today and getting Magnesium replacement. Continues on Tikosyn. CM will continue to follow.    Final Discharge Disposition Code 30 - still a patient                   Discharge Codes    No documentation.                 Expected Discharge Date and Time       Expected Discharge Date Expected Discharge Time    May 3, 2024               Carmina Carpenter RN

## 2024-05-01 NOTE — PLAN OF CARE
Goal Outcome Evaluation:  Plan of Care Reviewed With: patient        Progress: improving  Outcome Evaluation: a/o x 4; VSS, 3L n.c.; SR w/ pvc/pac; tylenol PRN given once for headache; no acute changes to report; QTc monitored closely; Anai bo consulted about 2100 tikosyn dose, instructed to give medication at the scheduled time; will continue POC              Problem: Adult Inpatient Plan of Care  Goal: Absence of Hospital-Acquired Illness or Injury  Intervention: Identify and Manage Fall Risk  Recent Flowsheet Documentation  Taken 5/1/2024 0400 by Tye Obando, RN  Safety Promotion/Fall Prevention:   clutter free environment maintained   fall prevention program maintained   nonskid shoes/slippers when out of bed   safety round/check completed   room organization consistent   activity supervised   assistive device/personal items within reach  Taken 5/1/2024 0200 by Tye Obando, RN  Safety Promotion/Fall Prevention:   activity supervised   assistive device/personal items within reach   clutter free environment maintained   fall prevention program maintained   nonskid shoes/slippers when out of bed   room organization consistent   safety round/check completed  Taken 5/1/2024 0000 by Tye Obando, RN  Safety Promotion/Fall Prevention:   activity supervised   clutter free environment maintained   assistive device/personal items within reach   fall prevention program maintained   nonskid shoes/slippers when out of bed   room organization consistent   safety round/check completed  Taken 4/30/2024 2200 by Tye Obando, RN  Safety Promotion/Fall Prevention:   assistive device/personal items within reach   clutter free environment maintained   activity supervised   fall prevention program maintained   nonskid shoes/slippers when out of bed   room organization consistent   safety round/check completed  Taken 4/30/2024 2000 by Tye Obando RN  Safety Promotion/Fall Prevention:    activity supervised   assistive device/personal items within reach   clutter free environment maintained   fall prevention program maintained   nonskid shoes/slippers when out of bed   room organization consistent   safety round/check completed     Problem: Adult Inpatient Plan of Care  Goal: Absence of Hospital-Acquired Illness or Injury  Intervention: Prevent Skin Injury  Recent Flowsheet Documentation  Taken 5/1/2024 0400 by Tye Obando RN  Body Position: position changed independently  Skin Protection:   adhesive use limited   transparent dressing maintained   tubing/devices free from skin contact  Taken 5/1/2024 0200 by Tye Obando RN  Body Position: position changed independently  Skin Protection:   adhesive use limited   tubing/devices free from skin contact   transparent dressing maintained  Taken 5/1/2024 0000 by Tye Obando RN  Body Position: position changed independently  Skin Protection:   transparent dressing maintained   tubing/devices free from skin contact   adhesive use limited  Taken 4/30/2024 2200 by Tye Obando RN  Body Position: position changed independently  Skin Protection:   adhesive use limited   incontinence pads utilized   transparent dressing maintained   tubing/devices free from skin contact  Taken 4/30/2024 2000 by Tye Obando RN  Body Position: position changed independently  Skin Protection:   adhesive use limited   tubing/devices free from skin contact   transparent dressing maintained   pulse oximeter probe site changed

## 2024-05-01 NOTE — MBS/VFSS/FEES
Acute Care - Speech Language Pathology   Swallow Initial Evaluation  Angela  Modified Barium Swallow Study (MBS)      Patient Name: Kerrie Medrano  : 1945  MRN: [id number]  Today's Date: 2024               Admit Date: 2024    Visit Dx:     ICD-10-CM ICD-9-CM   1. Atrial fibrillation with RVR  I48.91 427.31   2. Dysphagia, unspecified type  R13.10 787.20     Patient Active Problem List   Diagnosis    Primary hypertension    Mixed hyperlipidemia    Anxiety and depression    Persistent insomnia    Coronary artery disease involving native coronary artery of native heart without angina pectoris    Chronic GERD    Primary osteoarthritis involving multiple joints    General medical exam    Class 2 severe obesity due to excess calories with serious comorbidity and body mass index (BMI) of 39.0 to 39.9 in adult    Need for hepatitis C screening test    Screening for colon cancer    Prostate cancer screening    Type 2 diabetes mellitus without complication, without long-term current use of insulin    Chronic pain of right knee    OA (osteoarthritis) of knee    Arthritis of right knee    Pre-operative cardiovascular examination    Urinary frequency    Abnormal EKG    Atrial fibrillation with RVR    Shortness of breath    RBBB    Acute HFrEF (heart failure with reduced ejection fraction)    Moderate persistent asthma without complication    SALIMA and COPD overlap syndrome    Groin pain    Atrial fibrillation, persistent     Past Medical History:   Diagnosis Date    Benign essential hypertension     Disorder of hair and hair follicles     Joint pain      Past Surgical History:   Procedure Laterality Date    APPENDECTOMY  1960    CARDIAC CATHETERIZATION      2023 PER DR. ARENAS    CARDIAC CATHETERIZATION Left 2023    Procedure: Cardiac Catheterization/Vascular Study;  Surgeon: Glen Arenas MD;  Location: [facility] CATH INVASIVE LOCATION;  Service: Cardiovascular;  Laterality: Left;     COLONOSCOPY  04/2009    COLON POLYP-20 CM INFLAMMATORY POLYP, PLAN: REPEAT IN 5 YEARS, NO FH OF COLON CANCER    KNEE SURGERY      ARTHROCENTESIS OF THE RIGHT KNEE MEDIAL JOINT LINE  AND LEFT KNEE    SKIN CANCER EXCISION         SLP Recommendation and Plan  SLP Swallowing Diagnosis: functional oral phase, functional pharyngeal phase, esophageal dysphagia, other (see comments) (per Radiology PA, limited upper GI revealed reflux) (05/01/24 1420)  SLP Diet Recommendation: soft to chew textures, whole, thin liquids (extra sauce/gravy as needed; safe to upgrade to regular/thin if requesting) (05/01/24 1420)  Recommended Precautions and Strategies: general aspiration precautions, reflux precautions (05/01/24 1420)  SLP Rec. for Method of Medication Administration: meds whole, with puree, as tolerated (05/01/24 1420)     Monitor for Signs of Aspiration: yes, notify SLP if any concerns (05/01/24 1420)     Swallow Criteria for Skilled Therapeutic Interventions Met: no problems identified which require skilled intervention (05/01/24 1420)  Anticipated Discharge Disposition (SLP): No further SLP services warranted (05/01/24 1420)     Therapy Frequency (Swallow): evaluation only (05/01/24 1420)     Oral Care Recommendations: Oral Care BID/PRN, Toothbrush (05/01/24 1420)     Swallowing Considerations per Physician Discretion: medical management of suspected esophageal dysphagia, as indicated, other (see comments) (will defer to GI for management of esophageal dysphagia) (05/01/24 1420)                                  Plan of Care Reviewed With: patient      SWALLOW EVALUATION (Last 72 Hours)       SLP Adult Swallow Evaluation       Row Name 05/01/24 1420 04/29/24 1110                Rehab Evaluation    Document Type discharge evaluation/summary  -MP evaluation  -AC       Subjective Information no complaints  -MP no complaints  -AC       Patient Observations alert;cooperative  -MP alert;cooperative  -AC        Patient/Family/Caregiver Comments/Observations No family present  -MP Dtr present.  -AC       Patient Effort good  -MP good  -AC       Oral Care -- dental appliance cleaned  -AC          General Information    Patient Profile Reviewed yes  -MP yes  -AC       Pertinent History Of Current Problem See prev eval for full hx. MBS + ltd upper GI to further assess.  -MP Afib w/ RVR. Consulted 2' pt c/o difficulty swallowing pills & regurgitation w/ liq. Reported sxs have been intermittent x3 weeks. Has undergone swallow testing in past @ OSH, but cannot recall which study or findings/recs. Scheduled to see GI early May. Hx GERD, OA, SALIMA, COPD.  -AC       Current Method of Nutrition soft to chew textures;whole;thin liquids  -MP regular textures;thin liquids  -AC       Precautions/Limitations, Vision -- WFL;for purposes of eval  -AC       Precautions/Limitations, Hearing -- WFL;for purposes of eval  [place], but grossly WFL w/ increased volume  -AC       Prior Level of Function-Swallowing -- no diet consistency restrictions;esophageal concerns  -AC       Plans/Goals Discussed with -- patient and family;agreed upon  -AC       Barriers to Rehab -- none identified  -AC       Patient's Goals for Discharge -- eat/drink without coughing/choking  -AC       Family Goals for Discharge -- family did not state  -AC          Pain    Additional Documentation Pain Scale: FACES Pre/Post-Treatment (Group)  -MP Pain Scale: Numbers Pre/Post-Treatment (Group)  -AC          Pain Scale: Numbers Pre/Post-Treatment    Pretreatment Pain Rating -- 0/10 - no pain  -AC       Posttreatment Pain Rating -- 0/10 - no pain  -AC          Pain Scale: FACES Pre/Post-Treatment    Pain: FACES Scale, Pretreatment 0-->no hurt  -MP --       Posttreatment Pain Rating 0-->no hurt  -MP --          Oral Motor Structure and Function    Dentition Assessment -- upper dentures/partial in place;lower dentures/partial in place  -AC       Secretion Management -- WNL/WFL  -AC     "   Mucosal Quality -- moist, healthy  -          Oral Musculature and Cranial Nerve Assessment    Oral Motor General Assessment -- WFL  -AC          General Eating/Swallowing Observations    Respiratory Support Currently in Use -- nasal cannula  -AC       O2 Liters -- 3L  -AC       Eating/Swallowing Skills -- self-fed;appropriate self-feeding skills observed  -       Positioning During Eating -- upright in bed  -       Utensils Used -- spoon;cup;straw  prefers drinking w/o straw  -AC       Consistencies Trialed -- thin liquids;pureed;regular textures  -          Clinical Swallow Eval    Oral Prep Phase -- WFL  -AC       Oral Transit -- WFL  -AC       Oral Residue -- WFL  -AC       Pharyngeal Phase -- suspected pharyngeal impairment  -AC       Esophageal Phase -- suspected esophageal impairment  -          Pharyngeal Phase Concerns    Pharyngeal Phase Concerns -- other (see comments)  -          Esophageal Phase Concerns    Esophageal Phase Concerns -- sensation of material sticking  -       Sensation of Material Sticking -- regular consistencies;thin  -AC       Esophageal Phase Concerns, Comment -- Pt c/o sticking sensation (R side of throat + substernally) and paused a few times during eval for \"reflux.\" No overt clinical s/sxs aspiration, but generally @ risk given complaints/hx. Pt would like to proceed w/ MBS to further assess swallowing. Feels that sxs affect his intake.  -          MBS/VFSS    Utensils Used spoon;cup;straw  -MP --       Consistencies Trialed thin liquids;pudding thick;regular textures  -MP --          MBS/VFSS Interpretation    Oral Prep Phase WFL  -MP --       Oral Transit Phase WFL  -MP --       Oral Residue WFL  -MP --       VFSS Summary Functional oropharyngeal swallow. No penetration/aspiration or significant pharyngeal residue w/ any consistency tested. Per Radiology PA, limited upper GI revealed reflux. From SLP/pharyngeal standpoint, pt safe to continue soft/whole diet " (can upgrade to regular if requesting), extra sauces/gravies as needed, thin liquids w/ standard aspiration prctns. Will defer mgmt of esophageal dysphagia to GI.  -MP --          Initiation of Pharyngeal Swallow    Initiation of Pharyngeal Swallow WFL  -MP --       Pharyngeal Phase functional pharyngeal phase of swallowing  -MP --          Esophageal Phase    Esophageal Phase see radiology report for further details  -MP --          Swallowing Quality of Life Assessment    Education and counseling provided -- Signs of aspiration;Risks of aspiration;Aspiration precautions  -          SLP Evaluation Clinical Impression    SLP Swallowing Diagnosis functional oral phase;functional pharyngeal phase;esophageal dysphagia;other (see comments)  per Radiology PA, limited upper GI revealed reflux  -MP R/O pharyngeal dysphagia;esophageal dysphagia  -       Functional Impact -- risk of aspiration/pneumonia;risk of dehydration;risk of malnutrition  -       Rehab Potential/Prognosis, Swallowing -- good, to achieve stated therapy goals  -       Swallow Criteria for Skilled Therapeutic Interventions Met no problems identified which require skilled intervention  -MP demonstrates skilled criteria  -          Recommendations    Therapy Frequency (Swallow) evaluation only  - --       SLP Diet Recommendation soft to chew textures;whole;thin liquids  extra sauce/gravy as needed; safe to upgrade to regular/thin if requesting  - soft to chew textures;whole;thin liquids  extra sauce/gravy  -       Recommended Diagnostics -- VFSS (MBS);with esophageal screen  -       Recommended Precautions and Strategies general aspiration precautions;reflux precautions  - upright posture during/after eating;general aspiration precautions;reflux precautions  -       Oral Care Recommendations Oral Care BID/PRN;Toothbrush  - Oral Care BID/PRN;Toothbrush  -       SLP Rec. for Method of Medication Administration meds whole;with  puree;as tolerated  -MP meds whole;with puree;as tolerated  one @ a time  -AC       Monitor for Signs of Aspiration yes;notify SLP if any concerns  -MP yes;notify SLP if any concerns  -AC       Anticipated Discharge Disposition (SLP) No further SLP services warranted  -MP home with OP services  -AC       Swallowing Considerations per Physician Discretion medical management of suspected esophageal dysphagia, as indicated;other (see comments)  will defer to GI for management of esophageal dysphagia  -MP --                 User Key  (r) = Recorded By, (t) = Taken By, (c) = Cosigned By      Initials Name Effective Dates    AC Amanda Toth MS CCC-SLP 02/03/23 -     MP Aroldo Cornelius MS CCC-SLP 12/28/21 -                     EDUCATION  The patient has been educated in the following areas:   Dysphagia (Swallowing Impairment).              Time Calculation:    Time Calculation- SLP       Row Name 05/01/24 1452             Time Calculation- SLP    SLP Start Time 1410  -MP      SLP Received On 05/01/24  -MP         Untimed Charges    05379-OQ Motion Fluoro Eval Swallow Minutes 60  -MP         Total Minutes    Untimed Charges Total Minutes 60  -MP       Total Minutes 60  -MP                User Key  (r) = Recorded By, (t) = Taken By, (c) = Cosigned By      Initials Name Provider Type    MP Aroldo Cornelius MS CCC-SLP Speech and Language Pathologist                    Therapy Charges for Today       Code Description Service Date Service Provider Modifiers Qty    [id number]  ST MOTION FLUORO EVAL SWALLOW 4 5/1/2024 Aroldo Cornelius MS CCC-SLP GN 1                 Aroldo Johnson MS CCC-SLP  5/1/2024

## 2024-05-01 NOTE — PROGRESS NOTES
"  [facility] at [facility]  CARDIAC EP PROGRESS NOTE    Date of Admission: 4/29/2024  Date of Service: 05/01/24    Primary Care Physician: Jermaine Lopez MD    Chief Complaint: Atrial Fibrillation      Subjective      HPI: patient reports he feels much better since cardioversion. Still mildly short of breath but at his baseline. Ambulated in the [place] today without issue. No palpitations or dizziness.      Objective   Vitals: BP 95/56 (BP Location: Right arm, Patient Position: Lying)   Pulse 57   Temp 98.3 °F (36.8 °C) (Oral)   Resp 18   Ht 180.3 cm (71\")   Wt 125 kg (276 lb)   SpO2 97%   BMI 38.49 kg/m²     Physical Exam:   GENERAL: Alert, cooperative, in no acute distress.   HEART: Regular rate and rhythm; no murmurs, rubs or gallops  LUNGS: Clear to auscultation bilaterally. No wheezing, rales or rhonchi. Nonlabored breathing    Results:      Results from last 7 days   Lab Units 05/01/24  0603 04/30/24  0422 04/29/24  1109   SODIUM mmol/L 139 138 139   POTASSIUM mmol/L 4.5 4.0 3.7   CHLORIDE mmol/L 101 100 102   CO2 mmol/L 26.0 26.0 22.0   BUN mg/dL 19 14 15   CREATININE mg/dL 1.05 1.08 1.07   GLUCOSE mg/dL 91 104* 131*      Lab Results   Component Value Date    TRIG 114 10/10/2023    HDL 49 10/10/2023     (H) 10/10/2023    AST 20 10/10/2023    ALT 19 10/10/2023                                     Intake/Output Summary (Last 24 hours) at 5/1/2024 0753  Last data filed at 5/1/2024 0200  Gross per 24 hour   Intake 400 ml   Output 1100 ml   Net -700 ml       I personally reviewed the patient's EKG/Telemetry data    Radiology Data:   No radiology results for the last 7 days      Current Medications:  apixaban, 5 mg, Oral, BID  aspirin, 81 mg, Oral, Daily  atorvastatin, 80 mg, Oral, Daily  budesonide-formoterol, 2 puff, Inhalation, BID - RT   And  tiotropium bromide monohydrate, 2 puff, Inhalation, Daily - RT  busPIRone, 10 mg, Oral, Q12H  [Held by provider] dofetilide, " 125 mcg, Oral, Q12H  etomidate, 0.1 mg/kg, Intravenous, Once  furosemide, 40 mg, Oral, BID  insulin lispro, 2-7 Units, Subcutaneous, 4x Daily AC & at Bedtime  metFORMIN ER, 1,500 mg, Oral, Daily With Dinner  metoprolol tartrate, 25 mg, Oral, Q12H  montelukast, 10 mg, Oral, Nightly  pantoprazole, 40 mg, Oral, Q AM  potassium chloride, 20 mEq, Oral, Daily  sacubitril-valsartan, 1 tablet, Oral, BID  sertraline, 100 mg, Oral, BID      Pharmacy Consult,            Assessment:   Persistent Atrial Fibrillation, Hx of Atrial Flutter  Diagnosis 10/2023 although unknown duration prior to that  Palpitations, fatigue and worsened dyspnea when out of rhythm  SONIA/ECV 11/2023 at FF rest LVEF 50%  Failed sotalol. Not good catheter ablation candidate s/t BMI 38 with COPD and other comorbidities  Nonobstructive CAD  Asa 81, Lipitor 80  HTN  HLD  Lipitor 80  COPD on 3 L/min  SALIMA  Type 2 Diabetes  4/2024 A1c 7.1  Metformin 1500 qd with Dinner         Plan:   QTc manually measured at 517 now that he is in sinus rhythm. Switched to 125 mcg last night. Will monitor EKGs on this dosage through today but if Qtc does not normalize on lower dose will have to switch to rate control. IF the patient can not tolerate Tikosyn, the options are rate control vs pacemaker to allow for more aggressive rate control vs pacemaker with AV node ablation.  We will document an echocardiogram to assess for structural heart abnormalities ahead of possible pacemaker- likely dual chamber pacemaker but will consider leadless.  Plan of care discussed with patient, patient's daughter April and Dr. Joaquin  S/p successful ECV yesterday with conversion to sinus rhythm from atrial flutter.  Will need to watch closely with patient on Zoloft at home. Will decrease his Zoloft to 100 mg bid  Will continue home PO diabetes medications and add SSI with ACHS fsbg  Need to use CPAP    Electronically signed by Bart Barajas PA-C, 05/01/24, 9:58 AM EDT.

## 2024-05-01 NOTE — PLAN OF CARE
Goal Outcome Evaluation:  Plan of Care Reviewed With: patient        Progress: no change  Outcome Evaluation: Pt presents at baseline for ADL completion and related transfers, SOA with activity, O2 sats stable on RA. OT signing off, defer to PT for any further mobility needs. Rec d/c to home with medically appropriate.

## 2024-05-02 ENCOUNTER — APPOINTMENT (OUTPATIENT)
Dept: GENERAL RADIOLOGY | Facility: HOSPITAL | Age: 79
DRG: 243 | End: 2024-05-02
Payer: MEDICARE

## 2024-05-02 LAB
ANION GAP SERPL CALCULATED.3IONS-SCNC: 12 MMOL/L (ref 5–15)
BUN SERPL-MCNC: 22 MG/DL (ref 8–23)
BUN/CREAT SERPL: 20.6 (ref 7–25)
CALCIUM SPEC-SCNC: 8.6 MG/DL (ref 8.6–10.5)
CHLORIDE SERPL-SCNC: 102 MMOL/L (ref 98–107)
CO2 SERPL-SCNC: 28 MMOL/L (ref 22–29)
CREAT SERPL-MCNC: 1.07 MG/DL (ref 0.76–1.27)
EGFRCR SERPLBLD CKD-EPI 2021: 71.5 ML/MIN/1.73
GLUCOSE BLDC GLUCOMTR-MCNC: 104 MG/DL (ref 70–130)
GLUCOSE BLDC GLUCOMTR-MCNC: 107 MG/DL (ref 70–130)
GLUCOSE BLDC GLUCOMTR-MCNC: 115 MG/DL (ref 70–130)
GLUCOSE BLDC GLUCOMTR-MCNC: 93 MG/DL (ref 70–130)
GLUCOSE SERPL-MCNC: 94 MG/DL (ref 65–99)
MAGNESIUM SERPL-MCNC: 2.1 MG/DL (ref 1.6–2.4)
POTASSIUM SERPL-SCNC: 4.5 MMOL/L (ref 3.5–5.2)
SODIUM SERPL-SCNC: 142 MMOL/L (ref 136–145)

## 2024-05-02 PROCEDURE — C1894 INTRO/SHEATH, NON-LASER: HCPCS | Performed by: STUDENT IN AN ORGANIZED HEALTH CARE EDUCATION/TRAINING PROGRAM

## 2024-05-02 PROCEDURE — C1898 LEAD, PMKR, OTHER THAN TRANS: HCPCS | Performed by: STUDENT IN AN ORGANIZED HEALTH CARE EDUCATION/TRAINING PROGRAM

## 2024-05-02 PROCEDURE — 33208 INSRT HEART PM ATRIAL & VENT: CPT | Performed by: STUDENT IN AN ORGANIZED HEALTH CARE EDUCATION/TRAINING PROGRAM

## 2024-05-02 PROCEDURE — C1892 INTRO/SHEATH,FIXED,PEEL-AWAY: HCPCS | Performed by: STUDENT IN AN ORGANIZED HEALTH CARE EDUCATION/TRAINING PROGRAM

## 2024-05-02 PROCEDURE — C1732 CATH, EP, DIAG/ABL, 3D/VECT: HCPCS | Performed by: STUDENT IN AN ORGANIZED HEALTH CARE EDUCATION/TRAINING PROGRAM

## 2024-05-02 PROCEDURE — 99152 MOD SED SAME PHYS/QHP 5/>YRS: CPT | Performed by: STUDENT IN AN ORGANIZED HEALTH CARE EDUCATION/TRAINING PROGRAM

## 2024-05-02 PROCEDURE — 25810000003 SODIUM CHLORIDE 0.9 % SOLUTION: Performed by: STUDENT IN AN ORGANIZED HEALTH CARE EDUCATION/TRAINING PROGRAM

## 2024-05-02 PROCEDURE — 93650 ICAR CATH ABLTJ AV NODE FUNC: CPT

## 2024-05-02 PROCEDURE — 71046 X-RAY EXAM CHEST 2 VIEWS: CPT

## 2024-05-02 PROCEDURE — 25510000001 IOPAMIDOL PER 1 ML: Performed by: STUDENT IN AN ORGANIZED HEALTH CARE EDUCATION/TRAINING PROGRAM

## 2024-05-02 PROCEDURE — C1769 GUIDE WIRE: HCPCS | Performed by: STUDENT IN AN ORGANIZED HEALTH CARE EDUCATION/TRAINING PROGRAM

## 2024-05-02 PROCEDURE — 83735 ASSAY OF MAGNESIUM: CPT

## 2024-05-02 PROCEDURE — C1785 PMKR, DUAL, RATE-RESP: HCPCS | Performed by: STUDENT IN AN ORGANIZED HEALTH CARE EDUCATION/TRAINING PROGRAM

## 2024-05-02 PROCEDURE — 25010000002 MIDAZOLAM PER 1 MG: Performed by: STUDENT IN AN ORGANIZED HEALTH CARE EDUCATION/TRAINING PROGRAM

## 2024-05-02 PROCEDURE — 25010000002 BUPIVACAINE 0.5 % SOLUTION: Performed by: STUDENT IN AN ORGANIZED HEALTH CARE EDUCATION/TRAINING PROGRAM

## 2024-05-02 PROCEDURE — 82948 REAGENT STRIP/BLOOD GLUCOSE: CPT

## 2024-05-02 PROCEDURE — 94799 UNLISTED PULMONARY SVC/PX: CPT

## 2024-05-02 PROCEDURE — 02HK3JZ INSERTION OF PACEMAKER LEAD INTO RIGHT VENTRICLE, PERCUTANEOUS APPROACH: ICD-10-PCS | Performed by: STUDENT IN AN ORGANIZED HEALTH CARE EDUCATION/TRAINING PROGRAM

## 2024-05-02 PROCEDURE — 0JH606Z INSERTION OF PACEMAKER, DUAL CHAMBER INTO CHEST SUBCUTANEOUS TISSUE AND FASCIA, OPEN APPROACH: ICD-10-PCS | Performed by: STUDENT IN AN ORGANIZED HEALTH CARE EDUCATION/TRAINING PROGRAM

## 2024-05-02 PROCEDURE — 99153 MOD SED SAME PHYS/QHP EA: CPT | Performed by: STUDENT IN AN ORGANIZED HEALTH CARE EDUCATION/TRAINING PROGRAM

## 2024-05-02 PROCEDURE — 02H63JZ INSERTION OF PACEMAKER LEAD INTO RIGHT ATRIUM, PERCUTANEOUS APPROACH: ICD-10-PCS | Performed by: STUDENT IN AN ORGANIZED HEALTH CARE EDUCATION/TRAINING PROGRAM

## 2024-05-02 PROCEDURE — 99231 SBSQ HOSP IP/OBS SF/LOW 25: CPT | Performed by: NURSE PRACTITIONER

## 2024-05-02 PROCEDURE — 93650 ICAR CATH ABLTJ AV NODE FUNC: CPT | Performed by: STUDENT IN AN ORGANIZED HEALTH CARE EDUCATION/TRAINING PROGRAM

## 2024-05-02 PROCEDURE — 25010000002 ONDANSETRON PER 1 MG: Performed by: STUDENT IN AN ORGANIZED HEALTH CARE EDUCATION/TRAINING PROGRAM

## 2024-05-02 PROCEDURE — 80048 BASIC METABOLIC PNL TOTAL CA: CPT

## 2024-05-02 PROCEDURE — 02583ZZ DESTRUCTION OF CONDUCTION MECHANISM, PERCUTANEOUS APPROACH: ICD-10-PCS | Performed by: STUDENT IN AN ORGANIZED HEALTH CARE EDUCATION/TRAINING PROGRAM

## 2024-05-02 PROCEDURE — 93005 ELECTROCARDIOGRAM TRACING: CPT | Performed by: STUDENT IN AN ORGANIZED HEALTH CARE EDUCATION/TRAINING PROGRAM

## 2024-05-02 PROCEDURE — 93005 ELECTROCARDIOGRAM TRACING: CPT

## 2024-05-02 PROCEDURE — 94664 DEMO&/EVAL PT USE INHALER: CPT

## 2024-05-02 PROCEDURE — 93600 BUNDLE OF HIS RECORDING: CPT | Performed by: STUDENT IN AN ORGANIZED HEALTH CARE EDUCATION/TRAINING PROGRAM

## 2024-05-02 PROCEDURE — 25010000002 FENTANYL CITRATE (PF) 50 MCG/ML SOLUTION: Performed by: STUDENT IN AN ORGANIZED HEALTH CARE EDUCATION/TRAINING PROGRAM

## 2024-05-02 PROCEDURE — 25010000002 CEFAZOLIN PER 500 MG: Performed by: STUDENT IN AN ORGANIZED HEALTH CARE EDUCATION/TRAINING PROGRAM

## 2024-05-02 DEVICE — GEN PM ASSURITY MRI DR RF PM2272: Type: IMPLANTABLE DEVICE | Status: FUNCTIONAL

## 2024-05-02 DEVICE — LD PM TENDRIL STS 6F52CM 2088TC52: Type: IMPLANTABLE DEVICE | Status: FUNCTIONAL

## 2024-05-02 DEVICE — LD PM TENDRIL STS 6F58CM 2088TC58: Type: IMPLANTABLE DEVICE | Status: FUNCTIONAL

## 2024-05-02 RX ORDER — ONDANSETRON 2 MG/ML
INJECTION INTRAMUSCULAR; INTRAVENOUS
Status: DISCONTINUED | OUTPATIENT
Start: 2024-05-02 | End: 2024-05-02 | Stop reason: HOSPADM

## 2024-05-02 RX ORDER — NITROGLYCERIN 0.4 MG/1
0.4 TABLET SUBLINGUAL
OUTPATIENT
Start: 2024-05-02

## 2024-05-02 RX ORDER — SODIUM CHLORIDE 0.9 % (FLUSH) 0.9 %
10 SYRINGE (ML) INJECTION AS NEEDED
Status: DISCONTINUED | OUTPATIENT
Start: 2024-05-02 | End: 2024-05-03 | Stop reason: HOSPADM

## 2024-05-02 RX ORDER — FENTANYL CITRATE 50 UG/ML
INJECTION, SOLUTION INTRAMUSCULAR; INTRAVENOUS
Status: DISCONTINUED | OUTPATIENT
Start: 2024-05-02 | End: 2024-05-02 | Stop reason: HOSPADM

## 2024-05-02 RX ORDER — ACETAMINOPHEN 325 MG/1
650 TABLET ORAL EVERY 4 HOURS PRN
Status: DISCONTINUED | OUTPATIENT
Start: 2024-05-02 | End: 2024-05-03 | Stop reason: HOSPADM

## 2024-05-02 RX ORDER — SODIUM CHLORIDE 0.9 % (FLUSH) 0.9 %
3 SYRINGE (ML) INJECTION EVERY 12 HOURS SCHEDULED
Status: DISCONTINUED | OUTPATIENT
Start: 2024-05-02 | End: 2024-05-03 | Stop reason: HOSPADM

## 2024-05-02 RX ORDER — SODIUM CHLORIDE 9 MG/ML
INJECTION, SOLUTION INTRAVENOUS
Status: COMPLETED | OUTPATIENT
Start: 2024-05-02 | End: 2024-05-02

## 2024-05-02 RX ORDER — SODIUM CHLORIDE 9 MG/ML
40 INJECTION, SOLUTION INTRAVENOUS AS NEEDED
Status: DISCONTINUED | OUTPATIENT
Start: 2024-05-02 | End: 2024-05-03 | Stop reason: HOSPADM

## 2024-05-02 RX ORDER — LIDOCAINE HYDROCHLORIDE 10 MG/ML
INJECTION, SOLUTION EPIDURAL; INFILTRATION; INTRACAUDAL; PERINEURAL
Status: DISCONTINUED | OUTPATIENT
Start: 2024-05-02 | End: 2024-05-02 | Stop reason: HOSPADM

## 2024-05-02 RX ORDER — ACETAMINOPHEN 650 MG/1
650 SUPPOSITORY RECTAL EVERY 4 HOURS PRN
Status: DISCONTINUED | OUTPATIENT
Start: 2024-05-02 | End: 2024-05-03 | Stop reason: HOSPADM

## 2024-05-02 RX ORDER — MIDAZOLAM HYDROCHLORIDE 1 MG/ML
INJECTION INTRAMUSCULAR; INTRAVENOUS
Status: DISCONTINUED | OUTPATIENT
Start: 2024-05-02 | End: 2024-05-02 | Stop reason: HOSPADM

## 2024-05-02 RX ORDER — CEFAZOLIN SODIUM IN 0.9 % NACL 3 G/100 ML
3000 INTRAVENOUS SOLUTION, PIGGYBACK (ML) INTRAVENOUS ONCE
OUTPATIENT
Start: 2024-05-02 | End: 2024-05-02

## 2024-05-02 RX ORDER — LIDOCAINE HYDROCHLORIDE AND EPINEPHRINE 5; 5 MG/ML; UG/ML
INJECTION, SOLUTION INFILTRATION; PERINEURAL
Status: DISCONTINUED | OUTPATIENT
Start: 2024-05-02 | End: 2024-05-02 | Stop reason: HOSPADM

## 2024-05-02 RX ORDER — BUPIVACAINE HYDROCHLORIDE 5 MG/ML
INJECTION, SOLUTION PERINEURAL
Status: DISCONTINUED | OUTPATIENT
Start: 2024-05-02 | End: 2024-05-02 | Stop reason: HOSPADM

## 2024-05-02 RX ADMIN — FUROSEMIDE 40 MG: 40 TABLET ORAL at 21:23

## 2024-05-02 RX ADMIN — METOPROLOL TARTRATE 25 MG: 25 TABLET, FILM COATED ORAL at 21:23

## 2024-05-02 RX ADMIN — Medication 3 ML: at 21:23

## 2024-05-02 RX ADMIN — POTASSIUM CHLORIDE 20 MEQ: 750 CAPSULE, EXTENDED RELEASE ORAL at 08:54

## 2024-05-02 RX ADMIN — SERTRALINE 100 MG: 100 TABLET, FILM COATED ORAL at 21:23

## 2024-05-02 RX ADMIN — DOFETILIDE 125 MCG: 0.12 CAPSULE ORAL at 21:23

## 2024-05-02 RX ADMIN — METOPROLOL TARTRATE 25 MG: 25 TABLET, FILM COATED ORAL at 08:54

## 2024-05-02 RX ADMIN — SERTRALINE 100 MG: 100 TABLET, FILM COATED ORAL at 08:54

## 2024-05-02 RX ADMIN — BUDESONIDE AND FORMOTEROL FUMARATE DIHYDRATE 2 PUFF: 160; 4.5 AEROSOL RESPIRATORY (INHALATION) at 07:54

## 2024-05-02 RX ADMIN — ASPIRIN 81 MG: 81 TABLET, COATED ORAL at 08:54

## 2024-05-02 RX ADMIN — TIOTROPIUM BROMIDE INHALATION SPRAY 2 PUFF: 3.12 SPRAY, METERED RESPIRATORY (INHALATION) at 07:54

## 2024-05-02 RX ADMIN — DOFETILIDE 125 MCG: 0.12 CAPSULE ORAL at 09:18

## 2024-05-02 RX ADMIN — SACUBITRIL AND VALSARTAN 1 TABLET: 49; 51 TABLET, FILM COATED ORAL at 08:54

## 2024-05-02 RX ADMIN — MONTELUKAST 10 MG: 10 TABLET, FILM COATED ORAL at 21:23

## 2024-05-02 RX ADMIN — FUROSEMIDE 40 MG: 40 TABLET ORAL at 08:54

## 2024-05-02 RX ADMIN — SACUBITRIL AND VALSARTAN 1 TABLET: 49; 51 TABLET, FILM COATED ORAL at 21:23

## 2024-05-02 RX ADMIN — SODIUM CHLORIDE 2000 MG: 900 INJECTION INTRAVENOUS at 15:37

## 2024-05-02 RX ADMIN — BUSPIRONE HYDROCHLORIDE 10 MG: 10 TABLET ORAL at 08:56

## 2024-05-02 RX ADMIN — BUSPIRONE HYDROCHLORIDE 10 MG: 10 TABLET ORAL at 21:23

## 2024-05-02 RX ADMIN — ATORVASTATIN CALCIUM 80 MG: 40 TABLET, FILM COATED ORAL at 08:54

## 2024-05-02 NOTE — PROGRESS NOTES
Kerrie Medrano  [id number]  1945   LOS: 3 days   Patient Care Team:  Jermaine Lopez MD as PCP - General (Family Medicine)  Xochitl Umana, RN as Ambulatory  ([facility])    Chief Complaint:  Follow up on atrial fibrillation, Tikosyn    Subjective  Patient wants to go ahead with pacemaker. Has had intermittent episodes of atrial fib and brief pauses. He denies chest pain, SOB, or dizziness. He has not been using CPAP and says he is up and down all night having to urinate. He is NPO.    Objective     Vital Sign Min/Max for last 24 hours  Temp  Min: 97.5 °F (36.4 °C)  Max: 98.2 °F (36.8 °C)   BP  Min: 84/50  Max: 126/71   Pulse  Min: 51  Max: 82   Resp  Min: 18  Max: 20   SpO2  Min: 94 %  Max: 97 %   No data recorded   Weight  Min: 125 kg (275 lb 9.2 oz)  Max: 125 kg (275 lb 9.2 oz)         04/29/24  0848 05/01/24  1719   Weight: 125 kg (276 lb) 125 kg (275 lb 9.2 oz)       No intake or output data in the 24 hours ending 05/02/24 0929    Physical Exam:     General Appearance:    Alert, cooperative, in no acute distress   Lungs:     Clear to auscultation,respirations regular, even and                unlabored    Heart:    Irregular and normal rate, normal S1 and S2, no            murmur, no gallop, no rub, no click   Abdomen:  Extremities:   Soft, nontender, bowel sounds audible x4    No edema, normal range of motion   Pulses:   Pulses palpable and equal bilaterally      Results Review:   Results from last 7 days   Lab Units 05/02/24  0648 05/01/24  0603 04/30/24  0422   SODIUM mmol/L 142 139 138   POTASSIUM mmol/L 4.5 4.5 4.0   CHLORIDE mmol/L 102 101 100   CO2 mmol/L 28.0 26.0 26.0   BUN mg/dL 22 19 14   CREATININE mg/dL 1.07 1.05 1.08   GLUCOSE mg/dL 94 91 104*   CALCIUM mg/dL 8.6 8.7 8.8           ECG 5/2/2024:  Sinus bradycardia with occasional premature ventricular complexes  Left axis deviation  Right bundle branch block  Inferior infarct (cited on or before  30-APR-2024)  Abnormal ECG  When compared with ECG of 01-MAY-2024 21:49, (Unconfirmed)  Right bundle branch block has replaced Incomplete right bundle branch block        Echocardiogram 5/1/2024:   Left ventricular systolic function is normal.  Left ventricular ejection fraction appears to be 51 - 55%.    Left atrial volume is mildly increased.    Orderline dilation of the aortic root is present. Borderline dilation of the ascending aorta is present.    No new chest x ray to review    Medication Review: Reviewed    Assessment & Plan   Patient is s/p ECV with conversion to NSR and has had brief episodes of atrial fib and short pauses. QTc slightly better today on ECG even though not optimal with goal  <500 ms. Patient switched to Tikosyn 125mcg bid yesterday.  Will plan on AVN ablation/pacemaker later today per Dr Joaquin's schedule.       Atrial fibrillation with RVR    Primary hypertension    Mixed hyperlipidemia    Coronary artery disease involving native coronary artery of native heart without angina pectoris    Type 2 diabetes mellitus without complication, without long-term current use of insulin    SALIMA and COPD overlap syndrome    Electronically signed by JERRY Covarrubias, 05/02/24, 10:07 AM EDT.     05/02/24  09:29 EDT

## 2024-05-02 NOTE — PROGRESS NOTES
Nutrition Services    Patient Name:  Kerrie Medrano  YOB: 1945  MRN: [id number]  Admit Date:  4/29/2024    Patient screened for chewing/swallowing difficulties. Chart reviewed. Patient no in room past two visits. NPO today for PM placement. No significant weight loss noted in EMR. Only one meal documented since admission: 100% @ dinner on 4/30. RDN will follow as able to complete full assessment.    Electronically signed by:  Jocelyn Nazario MS,RD,LD  05/02/24 16:00 EDT

## 2024-05-03 ENCOUNTER — READMISSION MANAGEMENT (OUTPATIENT)
Dept: CALL CENTER | Facility: HOSPITAL | Age: 79
End: 2024-05-03
Payer: MEDICARE

## 2024-05-03 VITALS
BODY MASS INDEX: 39.04 KG/M2 | TEMPERATURE: 98.1 F | DIASTOLIC BLOOD PRESSURE: 74 MMHG | HEIGHT: 70 IN | SYSTOLIC BLOOD PRESSURE: 147 MMHG | OXYGEN SATURATION: 95 % | WEIGHT: 272.7 LBS | RESPIRATION RATE: 16 BRPM | HEART RATE: 66 BPM

## 2024-05-03 LAB
ANION GAP SERPL CALCULATED.3IONS-SCNC: 9 MMOL/L (ref 5–15)
BUN SERPL-MCNC: 21 MG/DL (ref 8–23)
BUN/CREAT SERPL: 21.2 (ref 7–25)
CALCIUM SPEC-SCNC: 8.7 MG/DL (ref 8.6–10.5)
CHLORIDE SERPL-SCNC: 100 MMOL/L (ref 98–107)
CO2 SERPL-SCNC: 30 MMOL/L (ref 22–29)
CREAT SERPL-MCNC: 0.99 MG/DL (ref 0.76–1.27)
EGFRCR SERPLBLD CKD-EPI 2021: 78.5 ML/MIN/1.73
GLUCOSE BLDC GLUCOMTR-MCNC: 102 MG/DL (ref 70–130)
GLUCOSE SERPL-MCNC: 91 MG/DL (ref 65–99)
POTASSIUM SERPL-SCNC: 4.3 MMOL/L (ref 3.5–5.2)
QT INTERVAL: 448 MS
QT INTERVAL: 488 MS
QT INTERVAL: 492 MS
QT INTERVAL: 524 MS
QT INTERVAL: 538 MS
QT INTERVAL: 556 MS
QT INTERVAL: 556 MS
QT INTERVAL: 564 MS
QTC INTERVAL: 506 MS
QTC INTERVAL: 511 MS
QTC INTERVAL: 527 MS
QTC INTERVAL: 528 MS
QTC INTERVAL: 531 MS
QTC INTERVAL: 553 MS
QTC INTERVAL: 561 MS
QTC INTERVAL: 577 MS
SODIUM SERPL-SCNC: 139 MMOL/L (ref 136–145)

## 2024-05-03 PROCEDURE — 99024 POSTOP FOLLOW-UP VISIT: CPT | Performed by: PHYSICIAN ASSISTANT

## 2024-05-03 PROCEDURE — 93010 ELECTROCARDIOGRAM REPORT: CPT | Performed by: STUDENT IN AN ORGANIZED HEALTH CARE EDUCATION/TRAINING PROGRAM

## 2024-05-03 PROCEDURE — 94664 DEMO&/EVAL PT USE INHALER: CPT

## 2024-05-03 PROCEDURE — 94799 UNLISTED PULMONARY SVC/PX: CPT

## 2024-05-03 PROCEDURE — 80048 BASIC METABOLIC PNL TOTAL CA: CPT

## 2024-05-03 PROCEDURE — 93005 ELECTROCARDIOGRAM TRACING: CPT

## 2024-05-03 PROCEDURE — 82948 REAGENT STRIP/BLOOD GLUCOSE: CPT

## 2024-05-03 RX ADMIN — SERTRALINE 100 MG: 100 TABLET, FILM COATED ORAL at 08:22

## 2024-05-03 RX ADMIN — ASPIRIN 81 MG: 81 TABLET, COATED ORAL at 08:21

## 2024-05-03 RX ADMIN — FUROSEMIDE 40 MG: 40 TABLET ORAL at 08:21

## 2024-05-03 RX ADMIN — METOPROLOL TARTRATE 25 MG: 25 TABLET, FILM COATED ORAL at 08:21

## 2024-05-03 RX ADMIN — ATORVASTATIN CALCIUM 80 MG: 40 TABLET, FILM COATED ORAL at 08:21

## 2024-05-03 RX ADMIN — SACUBITRIL AND VALSARTAN 1 TABLET: 49; 51 TABLET, FILM COATED ORAL at 08:22

## 2024-05-03 RX ADMIN — BUSPIRONE HYDROCHLORIDE 10 MG: 10 TABLET ORAL at 08:20

## 2024-05-03 RX ADMIN — ACETAMINOPHEN 650 MG: 325 TABLET ORAL at 04:09

## 2024-05-03 RX ADMIN — Medication 3 ML: at 08:21

## 2024-05-03 RX ADMIN — POTASSIUM CHLORIDE 20 MEQ: 750 CAPSULE, EXTENDED RELEASE ORAL at 08:21

## 2024-05-03 RX ADMIN — PANTOPRAZOLE SODIUM 40 MG: 40 TABLET, DELAYED RELEASE ORAL at 05:54

## 2024-05-03 RX ADMIN — BUDESONIDE AND FORMOTEROL FUMARATE DIHYDRATE 2 PUFF: 160; 4.5 AEROSOL RESPIRATORY (INHALATION) at 08:00

## 2024-05-03 NOTE — DISCHARGE SUMMARY
[facility] at [facility]  DISCHARGE SUMMARY      PCP: Jermaine Lopez MD  Referring MD:  Date of Admit:  4/29/2024  Date of Discharge:  5/3/2024        Discharge Diagnosis:  Active Hospital Problems    Diagnosis     **Atrial fibrillation with RVR     SALIMA and COPD overlap syndrome     Type 2 diabetes mellitus without complication, without long-term current use of insulin     Primary hypertension     Mixed hyperlipidemia     Coronary artery disease involving native coronary artery of native heart without angina pectoris      Problem List:  Persistent atrial fibrillation/flutter  Diagnosis 10/2023 incidental EKG finding atrial fibrillation versus atrial flutter with RVR, RBBB  SONIA/ECV 11/2023 ([place]) LVEF 50%  S/p successful ECV 1/2024 on sotalol 120 twice daily with recurrence of atrial flutter within a month  Failed Tikosyn therapy due to prolonged QT  EP study and RFA of AV node, new dual-chamber pacemaker placement Dr. Joaquin May 2, 2024  HFpEF  TTE 10/2023: Poor study no LVEF measurement  [facility] (Dr. Lira, 11/2023): Minor nonobstructive CAD, mid LAD diffuse 20%, dominant LCx with mid L PDA 40-50% lesion, normal nondominant small RCA.  LV pressures (S/D/B): 134/-1/7 mmHg  SONIA/ECV at [facility]   Nonobstructive CAD  Hypertension  Dyslipidemia  Chronic right bundle branch block  Type 2 diabetes  COPD on 3 L/min O2  BMI 39.5    Hospital Course:   Pleasant 78-year-old gentleman admitted to [facility] for initiation of Tikosyn therapy due to persistent symptomatic atrial fibrillation.  Unfortunately Tikosyn was not tolerated due to prolonged QT.  It was elected by Dr. Pike to pursue EP study and RFA of AV node and pacemaker placement due to patient's symptoms of atrial fibrillation.  The procedure was performed yesterday and was successful.  Today the patient sitting up in chair alert oriented eating breakfast.  He is hard of hearing.  Pacemaker site remained stable no  hematoma.  Groin site remained stable no hematoma.  He has been walking the hallways and eager to be discharged home.    Procedures Performed:  Pacemaker placement, EP study and AV node ablation procedure Dr. Joaquin.  Postprocedure chest x-ray reveals no pneumothorax stable lead placement.    Pertinent Test Results:   Narrative & Impression      XR CHEST PA AND LATERAL   Date of Exam: 5/2/2024 8:38 PM EDT   Indication: post pacemaker   Comparison: Chest CT January 24, 2024, chest radiographs October 10, 2023   Findings:  Lateral view is limited by overlap of patient's arm. Lung volumes are low. No definite new focal or diffuse pulmonary infiltrate is identified.  No pleural effusion or pneumothorax is seen.   The heart is enlarged, as before. There is a new left chest wall pacemaker. Electrodes terminate in the area of the right ventricle and right atrial appendage. There is atherosclerosis of the aorta. Degenerative changes are present in the thoracic spine.   IMPRESSION:  1.New left chest wall pacemaker. No pneumothorax.  2.Stable cardiomegaly.  3.No definite radiographic findings of acute pulmonary abnormality.   Electronically Signed: Jermaine Bonilla    5/2/2024 8:53 PM EDT    Workstation ID: [id number]       Labs/Diagnostic Data  Results from last 7 days   Lab Units 05/03/24  0348 05/02/24  0648 05/01/24  0603   SODIUM mmol/L 139 142 139   POTASSIUM mmol/L 4.3 4.5 4.5   CHLORIDE mmol/L 100 102 101   CO2 mmol/L 30.0* 28.0 26.0   BUN mg/dL 21 22 19   CREATININE mg/dL 0.99 1.07 1.05   GLUCOSE mg/dL 91 94 91   CALCIUM mg/dL 8.7 8.6 8.7             Results from last 7 days   Lab Units 05/02/24  0648   MAGNESIUM mg/dL 2.1                             Vital Signs  Temp:  [97.5 °F (36.4 °C)-98.5 °F (36.9 °C)] 98.1 °F (36.7 °C)  Heart Rate:  [58-86] 66  Resp:  [16-21] 16  BP: ()/(47-86) 147/74    Physical Exam:  Lungs: CTA  CV: Normal S1 and S2 without murmur or Gallop  Ext: No edema    Discharge Disposition:  Home or  Self CareDischarged home in stable to disposition.  Wound check in 1 week, device check in 3 months.    Discharge Medications:     Your medication list        CONTINUE taking these medications        Instructions Last Dose Given Next Dose Due   albuterol sulfate  (90 Base) MCG/ACT inhaler  Commonly known as: PROVENTIL HFA;VENTOLIN HFA;PROAIR HFA      Inhale 2 puffs Every 4 (Four) Hours As Needed for Wheezing (emphysema).       apixaban 5 MG tablet tablet  Commonly known as: ELIQUIS      Take 1 tablet by mouth 2 (Two) Times a Day.       aspirin 81 MG EC tablet      Take 1 tablet by mouth Daily.       atorvastatin 80 MG tablet  Commonly known as: LIPITOR      Take 1 tab po daily for cholesterol       busPIRone 10 MG tablet  Commonly known as: BUSPAR      Take 1 tab po Bid for mood       Entresto 49-51 MG tablet  Generic drug: sacubitril-valsartan      Take 1 tablet by mouth 2 (Two) Times a Day.       ezetimibe 10 MG tablet  Commonly known as: ZETIA      Take 1 tablet by mouth Daily.       Fluticasone-Umeclidin-Vilant 100-62.5-25 MCG/ACT inhaler  Commonly known as: Trelegy Ellipta      Inhale 1 puff Daily.       furosemide 40 MG tablet  Commonly known as: LASIX      Take 1 tablet by mouth 2 (Two) Times a Day.       metFORMIN  MG 24 hr tablet  Commonly known as: GLUCOPHAGE-XR      Take 4 tablets by mouth Daily With Dinner.       montelukast 10 MG tablet  Commonly known as: SINGULAIR      Take 1 tablet by mouth Every Night.       pantoprazole 40 MG EC tablet  Commonly known as: PROTONIX      Take 1 tablet by mouth Daily.       potassium chloride 10 MEQ CR tablet      Take 2 tablets by mouth Daily.       sertraline 100 MG tablet  Commonly known as: ZOLOFT      Take 2 tablets by mouth Every Night. TAKE ONE TABLET BY MOUTH DAILY FOR MOOD       Vitamin D3 50 MCG (2000 UT) capsule      Take 1 capsule by mouth Daily.              STOP taking these medications      sotalol 120 MG tablet  Commonly known as:  Betapace                 Discharge Diet: Cardiac diet    Activity at Discharge:   Recommend patient have limited range of motion of his left arm for 1 month.  No swimming pools hot tubs or submersion of his pacemaker site until wound check in 1 week.  Follow-up Appointments:  Future Appointments   Date Time Provider Department Center   7/12/2024  3:00 PM Vivek Dutta MD [facility]   8/5/2024  2:00 PM Jermaine Lopez MD [facility]   9/26/2024  1:30 PM Johann Joaquin MD [facility]                YONATAN Calderon  05/03/24  09:08 EDT

## 2024-05-03 NOTE — PLAN OF CARE
Goal Outcome Evaluation:  Plan of Care Reviewed With: patient        Progress: improving  Outcome Evaluation: Patient with discharge orders, family will transport the pt home.

## 2024-05-03 NOTE — CASE MANAGEMENT/SOCIAL WORK
Case Management Discharge Note      Final Note: Plan is home. Family will transport. No discharge needs identified. CM will continue to follow.         Selected Continued Care - Admitted Since 4/29/2024       Destination    No services have been selected for the patient.                Durable Medical Equipment    No services have been selected for the patient.                Dialysis/Infusion    No services have been selected for the patient.                Home Medical Care    No services have been selected for the patient.                Therapy    No services have been selected for the patient.                Community Resources    No services have been selected for the patient.                Community & DME    No services have been selected for the patient.                    Selected Continued Care - Episodes Includes continued care and service providers with selected services from the active episodes listed below      High Risk Care Management Episode start date: 12/15/2023   There are no active outsourced providers for this episode.                      Final Discharge Disposition Code: 01 - home or self-care

## 2024-05-03 NOTE — OUTREACH NOTE
Prep Survey      Flowsheet Row Responses   [facility] patient discharged from? [place]   Is LACE score < 7 ? No   Eligibility [facility]   Date of Admission 04/29/24   Date of Discharge 05/03/24   Discharge Disposition Home or Self Care   Discharge diagnosis Atrial fibrillation with RVR, permanent pacemaker placed   Does the patient have one of the following disease processes/diagnoses(primary or secondary)? General Surgery   Does the patient have Home health ordered? No   Is there a DME ordered? No   Prep survey completed? Yes            Shelia Duncan Registered Nurse

## 2024-05-03 NOTE — CASE MANAGEMENT/SOCIAL WORK
Continued Stay Note  [facility]     Patient Name: Kerrie Medrano  MRN: [id number]  Today's Date: 5/3/2024    Admit Date: 4/29/2024    Plan: Home   Discharge Plan       Row Name 05/03/24 0707       Plan    Plan Comments Tikosyn was run through [employer] and no prior authorized needed. CM will continue to follow.                   Discharge Codes    No documentation.                 Expected Discharge Date and Time       Expected Discharge Date Expected Discharge Time    May 3, 2024               Shankar Bernabe RN

## 2024-05-04 ENCOUNTER — TRANSITIONAL CARE MANAGEMENT TELEPHONE ENCOUNTER (OUTPATIENT)
Dept: CALL CENTER | Facility: HOSPITAL | Age: 79
End: 2024-05-04
Payer: MEDICARE

## 2024-05-04 NOTE — OUTREACH NOTE
Call Center TCM Note      Flowsheet Row Responses   [facility] patient discharged from? [place]   Does the patient have one of the following disease processes/diagnoses(primary or secondary)? General Surgery   TCM attempt successful? Yes   Call start time 1201   Call end time 1204   Discharge diagnosis Atrial fibrillation with RVR, permanent pacemaker placed   Person spoke with today (if not patient) and relationship Zulma-Dtr   Meds reviewed with patient/caregiver? Yes   Is the patient having any side effects they believe may be caused by any medication additions or changes? No   Does the patient have all medications related to this admission filled (includes all antibiotics, pain medications, etc.) N/A   Is the patient taking all medications as directed (includes completed medication regime)? Yes   Comments Hospital d/c f/u appt on 5/8/24 @10:30am   Does the patient have an appointment with their PCP within 7-14 days of discharge? Yes   Has home health visited the patient within 72 hours of discharge? N/A   Psychosocial issues? No   What is the patient's perception of their health status since discharge? Improving   Nursing interventions Nurse provided patient education   Is the patient /caregiver able to teach back basic post-op care? Lifting as instructed by MD in discharge instructions, Keep incision areas clean,dry and protected, No tub bath, swimming, or hot tub until instructed by MD, Take showers only when approved by MD-sponge bathe until then   Is the patient/caregiver able to teach back signs and symptoms of incisional infection? Fever, Increased redness, swelling or pain at the incisonal site, Increased drainage or bleeding   TCM call completed? Yes   Call end time 1204   Would this patient benefit from a Referral to [facility] Social Work? No   Is the patient interested in additional calls from an ambulatory ? No            Xochitl Dickinson RN    5/4/2024, 12:04 EDT

## 2024-05-05 LAB
QT INTERVAL: 526 MS
QTC INTERVAL: 547 MS

## 2024-05-07 LAB
QT INTERVAL: 522 MS
QTC INTERVAL: 503 MS

## 2024-05-08 ENCOUNTER — TELEPHONE (OUTPATIENT)
Dept: CARDIOLOGY | Facility: CLINIC | Age: 79
End: 2024-05-08
Payer: MEDICARE

## 2024-05-08 ENCOUNTER — OFFICE VISIT (OUTPATIENT)
Dept: FAMILY MEDICINE CLINIC | Facility: CLINIC | Age: 79
End: 2024-05-08
Payer: MEDICARE

## 2024-05-08 VITALS
SYSTOLIC BLOOD PRESSURE: 100 MMHG | OXYGEN SATURATION: 98 % | HEIGHT: 70 IN | BODY MASS INDEX: 39.18 KG/M2 | DIASTOLIC BLOOD PRESSURE: 60 MMHG | WEIGHT: 273.7 LBS | HEART RATE: 78 BPM

## 2024-05-08 DIAGNOSIS — I48.19 ATRIAL FIBRILLATION, PERSISTENT: Primary | ICD-10-CM

## 2024-05-08 DIAGNOSIS — E11.9 TYPE 2 DIABETES MELLITUS WITHOUT COMPLICATION, WITHOUT LONG-TERM CURRENT USE OF INSULIN: Chronic | ICD-10-CM

## 2024-05-08 DIAGNOSIS — Z95.0 PACEMAKER ECG PATTERN: ICD-10-CM

## 2024-05-08 DIAGNOSIS — M15.9 PRIMARY OSTEOARTHRITIS INVOLVING MULTIPLE JOINTS: Chronic | ICD-10-CM

## 2024-05-08 DIAGNOSIS — J44.9 OSA AND COPD OVERLAP SYNDROME: ICD-10-CM

## 2024-05-08 DIAGNOSIS — R06.02 SHORTNESS OF BREATH: ICD-10-CM

## 2024-05-08 DIAGNOSIS — E66.01 CLASS 2 SEVERE OBESITY DUE TO EXCESS CALORIES WITH SERIOUS COMORBIDITY AND BODY MASS INDEX (BMI) OF 39.0 TO 39.9 IN ADULT: ICD-10-CM

## 2024-05-08 DIAGNOSIS — I50.21 ACUTE HFREF (HEART FAILURE WITH REDUCED EJECTION FRACTION): ICD-10-CM

## 2024-05-08 DIAGNOSIS — I10 PRIMARY HYPERTENSION: Chronic | ICD-10-CM

## 2024-05-08 DIAGNOSIS — G47.33 OSA AND COPD OVERLAP SYNDROME: ICD-10-CM

## 2024-05-08 DIAGNOSIS — K21.9 CHRONIC GERD: Chronic | ICD-10-CM

## 2024-05-08 PROCEDURE — 1160F RVW MEDS BY RX/DR IN RCRD: CPT | Performed by: FAMILY MEDICINE

## 2024-05-08 PROCEDURE — 3078F DIAST BP <80 MM HG: CPT | Performed by: FAMILY MEDICINE

## 2024-05-08 PROCEDURE — 93000 ELECTROCARDIOGRAM COMPLETE: CPT | Performed by: FAMILY MEDICINE

## 2024-05-08 PROCEDURE — 1111F DSCHRG MED/CURRENT MED MERGE: CPT | Performed by: FAMILY MEDICINE

## 2024-05-08 PROCEDURE — 99495 TRANSJ CARE MGMT MOD F2F 14D: CPT | Performed by: FAMILY MEDICINE

## 2024-05-08 PROCEDURE — 1159F MED LIST DOCD IN RCRD: CPT | Performed by: FAMILY MEDICINE

## 2024-05-08 PROCEDURE — 3074F SYST BP LT 130 MM HG: CPT | Performed by: FAMILY MEDICINE

## 2024-05-08 NOTE — TELEPHONE ENCOUNTER
Name: Aditya,April    Relationship: Emergency Contact    Best Callback Number: [phone]    Incoming call to the Hub, requesting to  Reschedule their Wound Check appointment on 5.10.24.     Per Hub workflow, further review is needed before the task can be completed.    Result of Call: Transferred to office.

## 2024-05-10 ENCOUNTER — OFFICE VISIT (OUTPATIENT)
Dept: CARDIOLOGY | Facility: HOSPITAL | Age: 79
End: 2024-05-10
Payer: MEDICARE

## 2024-05-10 VITALS
SYSTOLIC BLOOD PRESSURE: 137 MMHG | WEIGHT: 272.4 LBS | BODY MASS INDEX: 39.09 KG/M2 | TEMPERATURE: 97.6 F | DIASTOLIC BLOOD PRESSURE: 68 MMHG | OXYGEN SATURATION: 93 % | HEART RATE: 96 BPM

## 2024-05-10 DIAGNOSIS — I10 PRIMARY HYPERTENSION: ICD-10-CM

## 2024-05-10 DIAGNOSIS — I50.32 CHRONIC HEART FAILURE WITH PRESERVED EJECTION FRACTION (HFPEF): Primary | ICD-10-CM

## 2024-05-10 DIAGNOSIS — I48.19 ATRIAL FIBRILLATION, PERSISTENT: ICD-10-CM

## 2024-05-13 ENCOUNTER — TELEPHONE (OUTPATIENT)
Dept: CASE MANAGEMENT | Facility: OTHER | Age: 79
End: 2024-05-13
Payer: MEDICARE

## 2024-05-13 NOTE — TELEPHONE ENCOUNTER
[facility] 30 day chart review completed. Patient is doing well since ablation for Afib. HTN is stable and DM is improving. Will close program at this time.

## 2024-05-14 DIAGNOSIS — I50.21 ACUTE HFREF (HEART FAILURE WITH REDUCED EJECTION FRACTION): ICD-10-CM

## 2024-05-14 RX ORDER — FUROSEMIDE 40 MG/1
40 TABLET ORAL 2 TIMES DAILY
Qty: 60 TABLET | Refills: 5 | Status: SHIPPED | OUTPATIENT
Start: 2024-05-14

## 2024-06-04 ENCOUNTER — TELEPHONE (OUTPATIENT)
Dept: CARDIOLOGY | Facility: CLINIC | Age: 79
End: 2024-06-04
Payer: MEDICARE

## 2024-06-04 NOTE — TELEPHONE ENCOUNTER
Left message on patient's daughter's phone letting her know that we didn't get the scheduled reading from her father's bedside home monitor because her father's defibrillator hasn't been in contact with the monitor.    Left my name and number for a return phone call.

## 2024-07-16 ENCOUNTER — OFFICE VISIT (OUTPATIENT)
Dept: CARDIOLOGY | Facility: CLINIC | Age: 79
End: 2024-07-16
Payer: MEDICARE

## 2024-07-16 VITALS
HEIGHT: 70 IN | HEART RATE: 106 BPM | OXYGEN SATURATION: 95 % | WEIGHT: 269 LBS | SYSTOLIC BLOOD PRESSURE: 128 MMHG | DIASTOLIC BLOOD PRESSURE: 72 MMHG | RESPIRATION RATE: 16 BRPM | BODY MASS INDEX: 38.51 KG/M2

## 2024-07-16 DIAGNOSIS — I50.32 CHRONIC HEART FAILURE WITH PRESERVED EJECTION FRACTION (HFPEF): Primary | ICD-10-CM

## 2024-07-16 DIAGNOSIS — Z95.0 PRESENCE OF CARDIAC PACEMAKER: ICD-10-CM

## 2024-07-16 DIAGNOSIS — I25.10 CORONARY ARTERY DISEASE INVOLVING NATIVE CORONARY ARTERY OF NATIVE HEART WITHOUT ANGINA PECTORIS: ICD-10-CM

## 2024-07-16 DIAGNOSIS — I48.91 ATRIAL FIBRILLATION WITH RVR: ICD-10-CM

## 2024-07-16 DIAGNOSIS — E78.2 MIXED HYPERLIPIDEMIA: ICD-10-CM

## 2024-07-16 RX ORDER — METOPROLOL SUCCINATE 25 MG/1
25 TABLET, EXTENDED RELEASE ORAL DAILY
Qty: 90 TABLET | Refills: 3 | Status: SHIPPED | OUTPATIENT
Start: 2024-07-16

## 2024-07-16 NOTE — ASSESSMENT & PLAN NOTE
Lipid abnormalities are stable, to target.     Plan:  Continue same medication/s without change.       Discussed medication dosage, use, side effects, and goals of treatment in detail.    Counseled patient on lifestyle modifications to help control hyperlipidemia.   Advised patient to exercise for 150 minutes weekly. (30 minute brisk walk, 5 days a week for example)  Weight Loss encouraged  Heart healthy low-fat low-cholesterol diet recommended.  Last lipid profile of patient showing total cholesterol 115, triglycerides 69, LDL 52, HDL 49.     Patient Treatment Goals:   LDL goal is less than 70     Followup in 6 months.

## 2024-07-16 NOTE — ASSESSMENT & PLAN NOTE
1/29/2023: [facility] at [facility], minor 20% mid LAD diffuse stenosis, distal L PDA 40-50%, small nondominant RCA.  LVEDP 7 mmHg.  Coronary artery disease is stable.  Blood pressure and heart rate to goal.  Lipids target less than 70 mg/dL.  Continue current treatment regimen. Dietary sodium restriction. Weight loss. Regular aerobic exercise.  Cardiac status will be reassessed in 6 months.

## 2024-07-16 NOTE — ASSESSMENT & PLAN NOTE
Patient shortness of breath is multifactorial related to COPD, sleep apnea possibly affecting RV, atrial fibrillation, HFpEF last known ejection fraction 50 to 55% on SONIA prior to cardioversion.  Patient seen prior AV node ablation and left bundle branch pacing, in view of difficult to control A-fib with RVR. Currently sinus rhythm with V pacing.  NYHA II, class C.  No volume overload.  BP and heart rate to goal.  Plan:  Continue optimal medical therapy for CHF  Resume metoprolol succinate ER in view of fast heart rates on device check/CHF/CAD

## 2024-07-16 NOTE — ASSESSMENT & PLAN NOTE
Dual-chamber pacemaker with left bundle branch pacing, [employer].  Device check today with no concerns.  Settings adjusted.  Underlying present at 40 bpm.  Continue routine device checks.

## 2024-07-16 NOTE — PROGRESS NOTES
[facility] [place]  [facility] CARDIOLOGY  [address] [place] [address]  Dept: [phone]  Dept Fax: [phone]    Date: 07/16/2024  Patient: Kerrie Medrano  YOB: 1945    Follow Up Office Visit Note    Interval Follow-up  Mr. Kerrie Medrano is a 78 y.o. male who is here for follow-up on Atrial Fibrillation and Shortness of Breath.    Subjective   Patient doing fine today with no complaints.  Infrequent chest pressure, usually after foods, felt related to gastric distention, relieved after few hours of digestion.  No relationship to exercise or stress.  Patient denies orthopnea, PND, palpitations, lightheadedness, syncope or medications side-effects.    The following portions of the patient's history were reviewed and updated as appropriate: allergies, current medications, past family history, past medical history, past social history, past surgical history, and problem list.    Medications: No Known Allergies   Current Outpatient Medications   Medication Instructions    albuterol sulfate  (90 Base) MCG/ACT inhaler 2 puffs, Inhalation, Every 4 Hours PRN    apixaban (ELIQUIS) 5 mg, Oral, 2 Times Daily    aspirin 81 mg, Oral, Daily    atorvastatin (LIPITOR) 80 MG tablet Take 1 tab po daily for cholesterol    busPIRone (BUSPAR) 10 MG tablet Take 1 tab po Bid for mood    ezetimibe (ZETIA) 10 mg, Oral, Daily    Fluticasone-Umeclidin-Vilant (Trelegy Ellipta) 100-62.5-25 MCG/ACT inhaler 1 puff, Inhalation, Daily - RT    furosemide (LASIX) 40 mg, Oral, 2 Times Daily    metFORMIN ER (GLUCOPHAGE-XR) 2,000 mg, Oral, Daily With Dinner    metoprolol succinate XL (TOPROL-XL) 25 mg, Oral, Daily    montelukast (SINGULAIR) 10 mg, Oral, Nightly    pantoprazole (PROTONIX) 40 mg, Oral, Daily    potassium chloride 10 MEQ CR tablet 20 mEq, Oral, Daily    sacubitril-valsartan (Entresto) 49-51 MG tablet 1 tablet, Oral, 2 Times Daily    sertraline (ZOLOFT) 200 mg, Oral, Nightly, TAKE  "ONE TABLET BY MOUTH DAILY FOR MOOD    Vitamin D3 2,000 Units, Oral, Daily       Tobacco Use: High Risk (7/16/2024)    Patient History     Smoking Tobacco Use: Never     Smokeless Tobacco Use: Current     Passive Exposure: Never        Objective  Vitals:    07/16/24 1421   BP: 128/72   BP Location: Right arm   Patient Position: Sitting   Pulse: 106   Resp: 16   SpO2: 95%   Weight: 122 kg (269 lb)   Height: 177.8 cm (70\")   PainSc:   3   PainLoc: Chest      Vitals:    07/16/24 1421   BP: 128/72   BP Location: Right arm   Patient Position: Sitting   Pulse: 106   Resp: 16   SpO2: 95%   Weight: 122 kg (269 lb)   Height: 177.8 cm (70\")          Physical Exam  Constitutional:       Appearance: Healthy appearance. Not in distress.   Eyes:      Pupils: Pupils are equal, round, and reactive to light.   Neck:      Vascular: No JVR. JVD normal.   Pulmonary:      Effort: Pulmonary effort is normal.      Breath sounds: Normal breath sounds. No wheezing. No rhonchi. No rales.   Chest:      Chest wall: Not tender to palpatation.   Cardiovascular:      PMI at left midclavicular line. Normal rate. Regular rhythm. Normal S1 with normal intensity. Normal S2 with normal intensity.       Murmurs: There is no murmur.      No gallop.  No click. No rub.   Pulses:     Intact distal pulses.   Edema:     Peripheral edema absent.   Abdominal:      General: There is no abdominal bruit.   Skin:     General: Skin is warm.   Neurological:      Mental Status: Alert and oriented to person, place and time.              Diagnostic Data  Lab Results   Component Value Date     05/03/2024    K 4.3 05/03/2024     05/03/2024    CO2 30.0 (H) 05/03/2024    MG 2.1 05/02/2024    BUN 21 05/03/2024    CREATININE 0.99 05/03/2024    CALCIUM 8.7 05/03/2024    BILITOT 1.0 10/10/2023    ALKPHOS 88 10/10/2023    ALT 19 10/10/2023    AST 20 10/10/2023    GLUCOSE 91 05/03/2024    ALBUMIN 4.2 10/10/2023     Lab Results   Component Value Date    WBC 7.7 " "02/15/2024    HGB 17.6 02/15/2024    HCT 52.9 (H) 02/15/2024     02/15/2024     No results found for: \"APTT\", \"INR\", \"PTT\"  Lab Results   Component Value Date    TROPONINT 14 10/11/2023     Lab Results   Component Value Date    PROBNP 640 (H) 10/11/2023       Lab Results   Component Value Date    CHLPL 188 10/10/2023    TRIG 114 10/10/2023    HDL 49 10/10/2023     (H) 10/10/2023     Lab Results   Component Value Date    TSH 3.270 10/11/2023    FREET4 1.10 10/10/2023       [facility]:    ECG 12 Lead    Date/Time: 7/16/2024 3:21 PM  Performed by: Vivek Dutta MD    Authorized by: Vivek Dutta MD  Comparison: compared with previous ECG from 5/3/2024  Similar to previous ECG  Rhythm: paced  Comments: A sensed with V left bundle branch paced          CXR: Results for orders placed during the hospital encounter of 04/29/24    XR Chest PA & Lateral    Narrative  XR CHEST PA AND LATERAL    Date of Exam: 5/2/2024 8:38 PM EDT    Indication: post pacemaker    Comparison: Chest CT January 24, 2024, chest radiographs October 10, 2023    Findings:  Lateral view is limited by overlap of patient's arm. Lung volumes are low. No definite new focal or diffuse pulmonary infiltrate is identified.  No pleural effusion or pneumothorax is seen.    The heart is enlarged, as before. There is a new left chest wall pacemaker. Electrodes terminate in the area of the right ventricle and right atrial appendage. There is atherosclerosis of the aorta. Degenerative changes are present in the thoracic spine.    Impression  1.New left chest wall pacemaker. No pneumothorax.  2.Stable cardiomegaly.  3.No definite radiographic findings of acute pulmonary abnormality.    Electronically Signed: Jermaine Bonilla  5/2/2024 8:53 PM EDT  Workstation ID: [id number]     ECHO/MUGA: Results for orders placed during the hospital encounter of 04/29/24    Adult Transthoracic Echo Complete W/ Cont if Necessary Per Protocol    Interpretation Summary    " Left ventricular systolic function is normal.  Left ventricular ejection fraction appears to be 51 - 55%.    Left atrial volume is mildly increased.    Orderline dilation of the aortic root is present. Borderline dilation of the ascending aorta is present.     STRESS TESTS: Results for orders placed in visit on 10/25/23    Stress Test With Myocardial Perfusion One Day    Interpretation Summary    Left ventricular ejection fraction is borderline normal (Calculated EF = 50%). and dropps to 40% at stress    Myocardial perfusion imaging indicates a moderate-sized, moderate-to-severe area of ischemia located in the apex.    Impressions are consistent with a high risk study.    Findings consistent with an indeterminate ECG stress test.     CARDIAC CATH: Results for orders placed during the hospital encounter of 11/29/23    Cardiac Catheterization/Vascular Study    Conclusion    Minor nonobstructive CAD, mid LAD diffuse 20%, dominant circumflex with a mid LPDA 40-50% lesion, normal nondominant small RCA    LV pressures (S/D/E) : 134/-1/7 mmHg     DEVICES: No valid procedures specified.   HOLTER: No results found for this or any previous visit.     CT/MRI:  Results for orders placed in visit on 01/24/24    CT Angiogram Chest    Narrative  CT ANGIOGRAM CHEST    Date of Exam: 1/24/2024 2:55 PM EST    Indication: abnormal cxr, chronic sob, chest pain.    Comparison: None available.    Technique: CTA of the chest was performed before and after the uneventful intravenous administration of 90 cc Isovue-370. Reconstructed coronal and sagittal images were also obtained. In addition, a 3-D volume rendered image was created for  interpretation. Automated exposure control and iterative reconstruction methods were used.      Findings:  There are no filling defects suspicious for pulmonary emboli. There are no enlarged mediastinal nodes. There are coronary calcifications. There is mild cardiomegaly. There are no pleural effusions. There  is pulmonary vascular congestion with slightly  prominent interstitial pattern. There is no focal consolidation. There are no noncalcified pulmonary nodules or masses.    Impression  Impression:  Negative exam for pulmonary embolism.    Findings suggest CHF with mild pulmonary edema.        Electronically Signed: Jasmine Mcduffie MD  1/24/2024 3:54 PM EST  Workstation ID: [id number]    VASCULAR: No valid procedures specified.     Assessment and Plan  Diagnoses and all orders for this visit:    1. Chronic heart failure with preserved ejection fraction (HFpEF) (Primary)  Assessment & Plan:  Patient shortness of breath is multifactorial related to COPD, sleep apnea possibly affecting RV, atrial fibrillation, HFpEF last known ejection fraction 50 to 55% on SONIA prior to cardioversion.  Patient seen prior AV node ablation and left bundle branch pacing, in view of difficult to control A-fib with RVR. Currently sinus rhythm with V pacing.  NYHA II, class C.  No volume overload.  BP and heart rate to goal.  Plan:  Continue optimal medical therapy for CHF  Resume metoprolol succinate ER in view of fast heart rates on device check/CHF/CAD    Orders:  -     metoprolol succinate XL (TOPROL-XL) 25 MG 24 hr tablet; Take 1 tablet by mouth Daily.  Dispense: 90 tablet; Refill: 3    2. Atrial fibrillation with RVR  Assessment & Plan:  Symptomatic atrial fibrillation, associated with shortness of breath and decreased excess capacity.  Cardioversion 1/14/2024 on sotalol and Eliquis.  Recurrence poorly tolerated.  Patient referred to EP with plan to change to Tikosyn, limited by QT prolongation.  Patient readmitted to the hospital for shortness of breath/exacerbation.  Patient underwent AV node ablation and dual-chamber pacemaker placement/left bundle branch pacing.  Plan:  Resume metoprolol succinate ER in view of fast heart rate on device check today  Continue Eliquis   Blood pressure control    Orders:  -     ECG 12 Lead    3. Mixed  hyperlipidemia  Assessment & Plan:  Lipid abnormalities are stable, to target.     Plan:  Continue same medication/s without change.       Discussed medication dosage, use, side effects, and goals of treatment in detail.    Counseled patient on lifestyle modifications to help control hyperlipidemia.   Advised patient to exercise for 150 minutes weekly. (30 minute brisk walk, 5 days a week for example)  Weight Loss encouraged  Heart healthy low-fat low-cholesterol diet recommended.  Last lipid profile of patient showing total cholesterol 115, triglycerides 69, LDL 52, HDL 49.     Patient Treatment Goals:   LDL goal is less than 70     Followup in 6 months.      4. Coronary artery disease involving native coronary artery of native heart without angina pectoris  Assessment & Plan:  1/29/2023: LHC at [facility], minor 20% mid LAD diffuse stenosis, distal L PDA 40-50%, small nondominant RCA.  LVEDP 7 mmHg.  Coronary artery disease is stable.  Blood pressure and heart rate to goal.  Lipids target less than 70 mg/dL.  Continue current treatment regimen. Dietary sodium restriction. Weight loss. Regular aerobic exercise.  Cardiac status will be reassessed in 6 months.      5. Presence of cardiac pacemaker  Assessment & Plan:  Dual-chamber pacemaker with left bundle branch pacing, [employer].  Device check today with no concerns.  Settings adjusted.  Underlying present at 40 bpm.  Continue routine device checks.           No follow-ups on file.    There are no Patient Instructions on file for this visit.    Vivek Dutta MD

## 2024-07-16 NOTE — ASSESSMENT & PLAN NOTE
Symptomatic atrial fibrillation, associated with shortness of breath and decreased excess capacity.  Cardioversion 1/14/2024 on sotalol and Eliquis.  Recurrence poorly tolerated.  Patient referred to EP with plan to change to Tikosyn, limited by QT prolongation.  Patient readmitted to the hospital for shortness of breath/exacerbation.  Patient underwent AV node ablation and dual-chamber pacemaker placement/left bundle branch pacing.  Plan:  Resume metoprolol succinate ER in view of fast heart rate on device check today  Continue Eliquis   Blood pressure control

## 2024-08-05 ENCOUNTER — OFFICE VISIT (OUTPATIENT)
Dept: FAMILY MEDICINE CLINIC | Facility: CLINIC | Age: 79
End: 2024-08-05
Payer: MEDICARE

## 2024-08-05 VITALS
BODY MASS INDEX: 37.94 KG/M2 | WEIGHT: 265 LBS | OXYGEN SATURATION: 95 % | DIASTOLIC BLOOD PRESSURE: 60 MMHG | SYSTOLIC BLOOD PRESSURE: 100 MMHG | HEART RATE: 68 BPM | HEIGHT: 70 IN

## 2024-08-05 DIAGNOSIS — F41.9 ANXIETY AND DEPRESSION: ICD-10-CM

## 2024-08-05 DIAGNOSIS — R06.02 SHORTNESS OF BREATH: ICD-10-CM

## 2024-08-05 DIAGNOSIS — Z12.5 PROSTATE CANCER SCREENING: ICD-10-CM

## 2024-08-05 DIAGNOSIS — Z95.0 PRESENCE OF CARDIAC PACEMAKER: ICD-10-CM

## 2024-08-05 DIAGNOSIS — I50.32 CHRONIC HEART FAILURE WITH PRESERVED EJECTION FRACTION (HFPEF): ICD-10-CM

## 2024-08-05 DIAGNOSIS — I25.118 CORONARY ARTERY DISEASE OF NATIVE ARTERY OF NATIVE HEART WITH STABLE ANGINA PECTORIS: ICD-10-CM

## 2024-08-05 DIAGNOSIS — R80.9 TYPE 2 DIABETES MELLITUS WITH DIABETIC MICROALBUMINURIA, WITHOUT LONG-TERM CURRENT USE OF INSULIN: ICD-10-CM

## 2024-08-05 DIAGNOSIS — E11.29 TYPE 2 DIABETES MELLITUS WITH DIABETIC MICROALBUMINURIA, WITHOUT LONG-TERM CURRENT USE OF INSULIN: ICD-10-CM

## 2024-08-05 DIAGNOSIS — I48.19 ATRIAL FIBRILLATION, PERSISTENT: ICD-10-CM

## 2024-08-05 DIAGNOSIS — E78.2 MIXED HYPERLIPIDEMIA: ICD-10-CM

## 2024-08-05 DIAGNOSIS — J44.9 OSA AND COPD OVERLAP SYNDROME: ICD-10-CM

## 2024-08-05 DIAGNOSIS — K21.9 CHRONIC GERD: ICD-10-CM

## 2024-08-05 DIAGNOSIS — Z00.00 GENERAL MEDICAL EXAM: Primary | ICD-10-CM

## 2024-08-05 DIAGNOSIS — G47.33 OSA AND COPD OVERLAP SYNDROME: ICD-10-CM

## 2024-08-05 DIAGNOSIS — M15.9 PRIMARY OSTEOARTHRITIS INVOLVING MULTIPLE JOINTS: ICD-10-CM

## 2024-08-05 DIAGNOSIS — E66.01 CLASS 2 SEVERE OBESITY DUE TO EXCESS CALORIES WITH SERIOUS COMORBIDITY AND BODY MASS INDEX (BMI) OF 38.0 TO 38.9 IN ADULT: ICD-10-CM

## 2024-08-05 DIAGNOSIS — F32.A ANXIETY AND DEPRESSION: ICD-10-CM

## 2024-08-05 DIAGNOSIS — J45.40 MODERATE PERSISTENT ASTHMA WITHOUT COMPLICATION: ICD-10-CM

## 2024-08-05 DIAGNOSIS — I10 PRIMARY HYPERTENSION: ICD-10-CM

## 2024-08-05 PROBLEM — M17.11 ARTHRITIS OF RIGHT KNEE: Status: RESOLVED | Noted: 2023-10-10 | Resolved: 2024-08-05

## 2024-08-05 PROBLEM — R10.30 GROIN PAIN: Status: RESOLVED | Noted: 2023-12-14 | Resolved: 2024-08-05

## 2024-08-05 PROBLEM — R35.0 URINARY FREQUENCY: Status: RESOLVED | Noted: 2023-10-10 | Resolved: 2024-08-05

## 2024-08-05 PROBLEM — Z11.59 NEED FOR HEPATITIS C SCREENING TEST: Status: RESOLVED | Noted: 2023-07-03 | Resolved: 2024-08-05

## 2024-08-05 PROBLEM — M25.561 CHRONIC PAIN OF RIGHT KNEE: Status: RESOLVED | Noted: 2023-07-31 | Resolved: 2024-08-05

## 2024-08-05 PROBLEM — Z12.11 SCREENING FOR COLON CANCER: Status: RESOLVED | Noted: 2023-07-03 | Resolved: 2024-08-05

## 2024-08-05 PROBLEM — M17.9 OA (OSTEOARTHRITIS) OF KNEE: Status: RESOLVED | Noted: 2023-08-24 | Resolved: 2024-08-05

## 2024-08-05 PROBLEM — G47.00 PERSISTENT INSOMNIA: Chronic | Status: RESOLVED | Noted: 2022-05-03 | Resolved: 2024-08-05

## 2024-08-05 PROBLEM — I45.10 RBBB: Status: RESOLVED | Noted: 2023-10-11 | Resolved: 2024-08-05

## 2024-08-05 PROBLEM — I25.10 CORONARY ARTERY DISEASE INVOLVING NATIVE CORONARY ARTERY OF NATIVE HEART WITHOUT ANGINA PECTORIS: Chronic | Status: ACTIVE | Noted: 2022-05-03

## 2024-08-05 PROBLEM — I48.91 ATRIAL FIBRILLATION WITH RVR: Status: RESOLVED | Noted: 2023-10-11 | Resolved: 2024-08-05

## 2024-08-05 PROBLEM — G89.29 CHRONIC PAIN OF RIGHT KNEE: Status: RESOLVED | Noted: 2023-07-31 | Resolved: 2024-08-05

## 2024-08-05 PROBLEM — I25.10 CORONARY ARTERY DISEASE INVOLVING NATIVE CORONARY ARTERY OF NATIVE HEART WITHOUT ANGINA PECTORIS: Chronic | Status: RESOLVED | Noted: 2022-05-03 | Resolved: 2024-08-05

## 2024-08-05 PROBLEM — I50.21 ACUTE HFREF (HEART FAILURE WITH REDUCED EJECTION FRACTION): Status: RESOLVED | Noted: 2023-10-26 | Resolved: 2024-08-05

## 2024-08-05 PROBLEM — Z01.810 PRE-OPERATIVE CARDIOVASCULAR EXAMINATION: Status: RESOLVED | Noted: 2023-10-10 | Resolved: 2024-08-05

## 2024-08-05 LAB
EXPIRATION DATE: ABNORMAL
HBA1C MFR BLD: 5.9 % (ref 4.5–5.7)
Lab: ABNORMAL

## 2024-08-05 RX ORDER — POTASSIUM CHLORIDE 750 MG/1
20 TABLET, FILM COATED, EXTENDED RELEASE ORAL DAILY
Qty: 180 TABLET | Refills: 1 | Status: SHIPPED | OUTPATIENT
Start: 2024-08-05

## 2024-08-05 RX ORDER — ATORVASTATIN CALCIUM 80 MG/1
TABLET, FILM COATED ORAL
Qty: 90 TABLET | Refills: 1 | Status: SHIPPED | OUTPATIENT
Start: 2024-08-05

## 2024-08-05 RX ORDER — ALBUTEROL SULFATE 90 UG/1
2 AEROSOL, METERED RESPIRATORY (INHALATION) EVERY 4 HOURS PRN
Qty: 18 G | Refills: 6 | Status: SHIPPED | OUTPATIENT
Start: 2024-08-05

## 2024-08-05 RX ORDER — SACUBITRIL AND VALSARTAN 49; 51 MG/1; MG/1
1 TABLET, FILM COATED ORAL 2 TIMES DAILY
Qty: 180 TABLET | Refills: 1 | Status: SHIPPED | OUTPATIENT
Start: 2024-08-05 | End: 2024-08-05 | Stop reason: SDUPTHER

## 2024-08-05 RX ORDER — SERTRALINE HYDROCHLORIDE 100 MG/1
200 TABLET, FILM COATED ORAL NIGHTLY
Qty: 180 TABLET | Refills: 1 | Status: SHIPPED | OUTPATIENT
Start: 2024-08-05

## 2024-08-05 RX ORDER — METFORMIN HYDROCHLORIDE 500 MG/1
2000 TABLET, EXTENDED RELEASE ORAL
Qty: 360 TABLET | Refills: 1 | Status: SHIPPED | OUTPATIENT
Start: 2024-08-05

## 2024-08-05 RX ORDER — BUSPIRONE HYDROCHLORIDE 10 MG/1
TABLET ORAL
Qty: 180 TABLET | Refills: 1 | Status: SHIPPED | OUTPATIENT
Start: 2024-08-05

## 2024-08-05 RX ORDER — MONTELUKAST SODIUM 10 MG/1
10 TABLET ORAL NIGHTLY
Qty: 90 TABLET | Refills: 1 | Status: SHIPPED | OUTPATIENT
Start: 2024-08-05

## 2024-08-05 RX ORDER — SACUBITRIL AND VALSARTAN 49; 51 MG/1; MG/1
1 TABLET, FILM COATED ORAL 2 TIMES DAILY
Qty: 180 TABLET | Refills: 1 | Status: SHIPPED | OUTPATIENT
Start: 2024-08-05

## 2024-08-05 RX ORDER — EZETIMIBE 10 MG/1
10 TABLET ORAL DAILY
Qty: 90 TABLET | Refills: 1 | Status: SHIPPED | OUTPATIENT
Start: 2024-08-05

## 2024-08-05 RX ORDER — ASPIRIN 81 MG/1
81 TABLET ORAL DAILY
Start: 2024-08-05

## 2024-08-05 RX ORDER — PANTOPRAZOLE SODIUM 40 MG/1
40 TABLET, DELAYED RELEASE ORAL DAILY
Qty: 90 TABLET | Refills: 1 | Status: SHIPPED | OUTPATIENT
Start: 2024-08-05

## 2024-08-05 NOTE — ASSESSMENT & PLAN NOTE
Hypertension is stable and controlled  Continue current treatment regimen.  Weight loss.  Regular aerobic exercise.  Blood pressure will be reassessed in 3 months.

## 2024-08-05 NOTE — ASSESSMENT & PLAN NOTE
Patient's (Body mass index is 38.02 kg/m².) indicates that they are  with health conditions that include  . Weight is . BMI  . We discussed .

## 2024-08-05 NOTE — ASSESSMENT & PLAN NOTE
Doing well after av node ablation and pacemaker placement    Ongoing followup with Dr Joaquin and dr Dutta

## 2024-08-05 NOTE — ASSESSMENT & PLAN NOTE
Diabetes is improving with treatment.   Continue current treatment regimen.  Regular aerobic exercise.  Reminded to get yearly retinal exam.  Diabetes will be reassessed  4 mos

## 2024-08-05 NOTE — ASSESSMENT & PLAN NOTE
Ongoing treatment with low-dose aspirin, atorvastatin, Zetia, Lasix, metoprolol, and Entresto.    Doing well after av node ablation and pacemaker placement    Ongoing followup with Dr Joaquin and dr Dutta

## 2024-08-05 NOTE — PROGRESS NOTES
Subjective   The ABCs of the Annual Wellness Visit  Medicare Wellness Visit      Kerrie Medrano is a 78 y.o. patient who presents for a Medicare Wellness Visit.    The following portions of the patient's history were reviewed and   updated as appropriate: allergies, current medications, past family history, past medical history, past social history, past surgical history, and problem list.    Compared to one year ago, the patient's physical   health is better.  Compared to one year ago, the patient's mental   health is better.    Recent Hospitalizations:  This patient has had a [facility] admission record on file within the last 365 days.  Current Medical Providers:  Patient Care Team:  Jermaine Lopez MD as PCP - General (Family Medicine)  Vivek Dutta MD as Cardiologist (Cardiology)  Johann Joaquin MD as Consulting Physician (Cardiology)  Deneen Choe MD as Consulting Physician (Gastroenterology)    Outpatient Medications Prior to Visit   Medication Sig Dispense Refill    Cholecalciferol (Vitamin D3) 50 MCG (2000 UT) capsule Take 1 capsule by mouth Daily.      furosemide (LASIX) 40 MG tablet Take 1 tablet by mouth 2 (Two) Times a Day. 60 tablet 5    metoprolol succinate XL (TOPROL-XL) 25 MG 24 hr tablet Take 1 tablet by mouth Daily. 90 tablet 3    albuterol sulfate  (90 Base) MCG/ACT inhaler Inhale 2 puffs Every 4 (Four) Hours As Needed for Wheezing (emphysema). 18 g 6    apixaban (ELIQUIS) 5 MG tablet tablet Take 1 tablet by mouth 2 (Two) Times a Day. 180 tablet 1    aspirin 81 MG EC tablet Take 1 tablet by mouth Daily.      atorvastatin (LIPITOR) 80 MG tablet Take 1 tab po daily for cholesterol 90 tablet 1    busPIRone (BUSPAR) 10 MG tablet Take 1 tab po Bid for mood 180 tablet 1    ezetimibe (ZETIA) 10 MG tablet Take 1 tablet by mouth Daily. 90 tablet 3    Fluticasone-Umeclidin-Vilant (Trelegy Ellipta) 100-62.5-25 MCG/ACT inhaler Inhale 1 puff Daily. 3 each 1    metFORMIN ER  (GLUCOPHAGE-XR) 500 MG 24 hr tablet Take 4 tablets by mouth Daily With Dinner. 360 tablet 1    montelukast (SINGULAIR) 10 MG tablet Take 1 tablet by mouth Every Night. 90 tablet 1    pantoprazole (PROTONIX) 40 MG EC tablet Take 1 tablet by mouth Daily. 90 tablet 1    potassium chloride 10 MEQ CR tablet Take 2 tablets by mouth Daily. 120 tablet 1    sacubitril-valsartan (Entresto) 49-51 MG tablet Take 1 tablet by mouth 2 (Two) Times a Day. 180 tablet 1    sertraline (ZOLOFT) 100 MG tablet Take 2 tablets by mouth Every Night. TAKE ONE TABLET BY MOUTH DAILY FOR MOOD 180 tablet 1     No facility-administered medications prior to visit.     No opioid medication identified on active medication list. I have reviewed chart for other potential  high risk medication/s and harmful drug interactions in the elderly.      Aspirin is on active medication list. Aspirin use is indicated based on review of current medical condition/s. Pros and cons of this therapy have been discussed today. Benefits of this medication outweigh potential harm.  Patient has been encouraged to continue taking this medication.  .      Patient Active Problem List   Diagnosis    Primary hypertension    Mixed hyperlipidemia    Anxiety and depression    Coronary artery disease of native artery of native heart with stable angina pectoris    Chronic GERD    Primary osteoarthritis involving multiple joints    General medical exam    Class 2 severe obesity due to excess calories with serious comorbidity and body mass index (BMI) of 38.0 to 38.9 in adult    Prostate cancer screening    Type 2 diabetes mellitus with diabetic microalbuminuria, without long-term current use of insulin    Abnormal EKG    Shortness of breath    Moderate persistent asthma without complication    SALIMA and COPD overlap syndrome    Atrial fibrillation, persistent    Presence of cardiac pacemaker    Chronic heart failure with preserved ejection fraction (HFpEF)     Advance Care Planning  "(Click this link to access ACP Navigator)  Advance Directive is not on file.  ACP discussion was held with the patient during this visit. Patient does not have an advance directive, information provided.        Objective   Vitals:    24 1354   BP: 100/60   BP Location: Left arm   Patient Position: Sitting   Cuff Size: Large Adult   Pulse: 68   SpO2: 95%   Weight: 120 kg (265 lb)   Height: 177.8 cm (70\")       Estimated body mass index is 38.02 kg/m² as calculated from the following:    Height as of this encounter: 177.8 cm (70\").    Weight as of this encounter: 120 kg (265 lb).             Does the patient have evidence of cognitive impairment? No  Lab Results   Component Value Date    HGBA1C 5.9 (A) 2024                                                                                                Health  Risk Assessment    Smoking Status:  Social History     Tobacco Use   Smoking Status Never    Passive exposure: Never   Smokeless Tobacco Current    Types: Chew     Alcohol Consumption:  Social History     Substance and Sexual Activity   Alcohol Use Never     Fall Risk Screen:  NERI Fall Risk Assessment was completed, and patient is at LOW risk for falls.Assessment completed on:2024    Depression Screenin/5/2024     1:47 PM   PHQ-2/PHQ-9 Depression Screening   Little Interest or Pleasure in Doing Things 0-->not at all   Feeling Down, Depressed or Hopeless 0-->not at all   PHQ-9: Brief Depression Severity Measure Score 0     Health Habits and Functional and Cognitive Screenin/5/2024     1:00 PM   Functional & Cognitive Status   Do you have difficulty preparing food and eating? No   Do you have difficulty bathing yourself, getting dressed or grooming yourself? No   Do you have difficulty using the toilet? No   Do you have difficulty moving around from place to place? No   Do you have trouble with steps or getting out of a bed or a chair? No   Current Diet Unhealthy Diet   Dental " Exam Up to date   Eye Exam Not up to date   Exercise (times per week) 0 times per week   Current Exercises Include No Regular Exercise   Do you need help using the phone?  No   Are you deaf or do you have serious difficulty hearing?  Yes   Do you need help to go to places out of walking distance? No   Do you need help shopping? No   Do you need help preparing meals?  No   Do you need help with housework?  No   Do you need help with laundry? No   Do you need help taking your medications? No   Do you need help managing money? No   Do you ever drive or ride in a car without wearing a seat belt? Yes   Have you felt unusual stress, anger or loneliness in the last month? No   Who do you live with? Alone   If you need help, do you have trouble finding someone available to you? No   Have you been bothered in the last four weeks by sexual problems? No   Do you have difficulty concentrating, remembering or making decisions? No             Age-appropriate Screening Schedule:  Refer to the list below for future screening recommendations based on patient's age, sex and/or medical conditions. Orders for these recommended tests are listed in the plan section. The patient has been provided with a written plan.    Health Maintenance List  Health Maintenance   Topic Date Due    DIABETIC EYE EXAM  Never done    ANNUAL WELLNESS VISIT  07/03/2024    INFLUENZA VACCINE  08/01/2024    LIPID PANEL  10/10/2024    HEMOGLOBIN A1C  02/05/2025    URINE MICROALBUMIN  04/04/2025    BMI FOLLOWUP  08/05/2025    COLORECTAL CANCER SCREENING  08/14/2028    HEPATITIS C SCREENING  Completed    Pneumococcal Vaccine 65+  Completed    COVID-19 Vaccine  Discontinued    RSV Vaccine - Adults  Discontinued    TDAP/TD VACCINES  Discontinued    ZOSTER VACCINE  Discontinued                                                                                                                                                CMS Preventative Services Quick Reference  Risk  Factors Identified During Encounter  Fall Risk-High or Moderate: Discussed Fall Prevention in the home  Immunizations Discussed/Encouraged: Tdap, Influenza, COVID19, and RSV (Respiratory Syncytial Virus)    The above risks/problems have been discussed with the patient.  Pertinent information has been shared with the patient in the After Visit Summary.  An After Visit Summary and PPPS were made available to the patient.    Follow Up:   Next Medicare Wellness visit to be scheduled in 1 year.         Additional E&M Note during same encounter follows:  Patient has additional, significant, and separately identifiable condition(s)/problem(s) that require work above and beyond the Medicare Wellness Visit     Chief Complaint  Diabetes, coronary artery disease, history of atrial fibrillation with rapid ventricular response, cardiomyopathy, hypertension, hyperlipidemia, anxiety, insomnia, osteoarthritis, chronic GERD    Subjective   HPI  Kerrie is also being seen today for an annual adult preventative physical exam.  and Kerrei is also being seen today for additional medical problem/s.    He has been doing better after placement of his pacemaker and continues to follow-up with Dr. Joaquin and Dr. Alexandre.    He does have ongoing problems with GERD and symptomatic hiatal hernia.  Overall much improved with Protonix.  He did complete a video swallow study without evidence for aspiration earlier this year.  He also had consultation with GI to follow-up on his CT findings of esophageal thickening and overall has been doing well with pantoprazole.    He does continue on low-dose aspirin, Zetia, metoprolol, Entresto, and atorvastatin along with Eliquis given history of coronary artery disease, cardiomyopathy, and atrial fibrillation with rapid ventricular response in the past.    No bleeding problems on his current regimen.    Declines vaccination update but is considering flu vaccine at his pharmacy.    Encouraged past-due diabetic  "eye exam.    A1c today doing well at 5.9 on metformin alone.    Using cane for ambulatory support given his ongoing problems with bilateral knee osteoarthritis.  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    No falls.    Environmental asthma and chronic cough remains manageable with trilogy and as needed albuterol.  He does also continue on Singulair.    Anxiety and depression with insomnia remains well-controlled on Zoloft with BuSpar    Encouraged advance directive.      Review of Systems   Constitutional:  Negative for activity change, appetite change, chills, fatigue and fever.   HENT:  Negative for ear pain, hearing loss and trouble swallowing.    Eyes:  Negative for pain and visual disturbance.   Respiratory:  Negative for cough, chest tightness, shortness of breath and wheezing.    Cardiovascular:  Negative for chest pain, palpitations and leg swelling.   Gastrointestinal:  Negative for abdominal pain and blood in stool.        See HPI.  Symptomatic hiatal hernia but overall GERD remains controlled with pantoprazole   Genitourinary:  Negative for difficulty urinating.   Musculoskeletal:  Positive for arthralgias and gait problem. Negative for back pain and joint swelling.        Using cane for ambulatory support and fall prevention   Skin:  Negative for rash.   Neurological:  Negative for dizziness, weakness and light-headedness.   Psychiatric/Behavioral:  Negative for agitation, behavioral problems, dysphoric mood and sleep disturbance.               Objective   Vital Signs:  /60 (BP Location: Left arm, Patient Position: Sitting, Cuff Size: Large Adult)   Pulse 68   Ht 177.8 cm (70\")   Wt 120 kg (265 lb)   SpO2 95%   BMI 38.02 kg/m²   Physical Exam  Constitutional:       Appearance: He is obese.   HENT:      Head: Normocephalic.      Right Ear: External ear normal.      Left Ear: External ear normal.      Nose: Nose normal.   Eyes:      Pupils: Pupils are equal, round, " and reactive to light.   Cardiovascular:      Rate and Rhythm: Normal rate and regular rhythm.      Heart sounds: Normal heart sounds.   Pulmonary:      Effort: Pulmonary effort is normal.      Breath sounds: Normal breath sounds.   Musculoskeletal:      Comments: Has osteoarthritic findings with bilateral knee crepitus and osteoarthritis.  Using cane for ambulatory support.   Skin:     General: Skin is warm and dry.   Neurological:      Mental Status: He is alert. Mental status is at baseline.      Gait: Gait abnormal.      Comments: See above.  Using cane for ambulatory support   Psychiatric:         Mood and Affect: Mood normal.         Behavior: Behavior normal.         Thought Content: Thought content normal.      Comments: Well-controlled on combination of Zoloft with BuSpar                 Assessment and Plan            Diagnoses and all orders for this visit:    1. General medical exam (Primary)  Assessment & Plan:  Discussed together health maintenance and screening along with vaccination options and healthy diet and exercise habits as part of the preventative counseling at their physical exam today.     Encouraged past-due diabetic eye exam.    A1c returned good today at 5.9.  Awaiting fasting labs.    Encouraged vaccination update he will consider flu vaccination at the pharmacy.      2. Type 2 diabetes mellitus with diabetic microalbuminuria, without long-term current use of insulin  Assessment & Plan:  Diabetes is improving with treatment.   Continue current treatment regimen.  Regular aerobic exercise.  Reminded to get yearly retinal exam.  Diabetes will be reassessed  4 mos    Orders:  -     POCT glycated hemoglobin, total  -     aspirin 81 MG EC tablet; Take 1 tablet by mouth Daily.  -     metFORMIN ER (GLUCOPHAGE-XR) 500 MG 24 hr tablet; Take 4 tablets by mouth Daily With Dinner.  Dispense: 360 tablet; Refill: 1  -     CBC Auto Differential; Future  -     Comprehensive Metabolic Panel; Future  -      Lipid Panel; Future  -     TSH; Future  -     T4, Free; Future  -     CBC Auto Differential  -     Comprehensive Metabolic Panel  -     Lipid Panel  -     TSH  -     T4, Free    3. Chronic GERD  Assessment & Plan:  Improved with current regimen.      Did have GI consult completed given thickening on CT scan of his distal esophagus.  He decided against endoscopic workup.  He did have a video swallow study with no evidence for aspiration.    Continue pantoprazole.    Orders:  -     pantoprazole (PROTONIX) 40 MG EC tablet; Take 1 tablet by mouth Daily.  Dispense: 90 tablet; Refill: 1    4. Primary hypertension  Assessment & Plan:  Hypertension is stable and controlled  Continue current treatment regimen.  Weight loss.  Regular aerobic exercise.  Blood pressure will be reassessed in 3 months.    Orders:  -     CBC Auto Differential; Future  -     Comprehensive Metabolic Panel; Future  -     Lipid Panel; Future  -     TSH; Future  -     T4, Free; Future  -     CBC Auto Differential  -     Comprehensive Metabolic Panel  -     Lipid Panel  -     TSH  -     T4, Free    5. Atrial fibrillation, persistent  Assessment & Plan:  Doing well after av node ablation and pacemaker placement    Ongoing followup with Dr Joaquin and dr Dutta    Orders:  -     apixaban (ELIQUIS) 5 MG tablet tablet; Take 1 tablet by mouth 2 (Two) Times a Day.  Dispense: 180 tablet; Refill: 1  -     CBC Auto Differential; Future  -     Comprehensive Metabolic Panel; Future  -     TSH; Future  -     T4, Free; Future  -     CBC Auto Differential  -     Comprehensive Metabolic Panel  -     TSH  -     T4, Free    6. Shortness of breath  Assessment & Plan:  Significantly improved after AV node ablation and pacemaker!       7. Primary osteoarthritis involving multiple joints  Assessment & Plan:  Continue Tylenol     Off NSAIDs and celebrex given Eliquis for Afib       8. SALIMA and COPD overlap syndrome  Assessment & Plan:  Cont SALIMA treatment with CPAP        9.  Mixed hyperlipidemia  Assessment & Plan:   Lipid abnormalities are improving with treatment    Plan:  Continue same medication/s without change.      Discussed medication dosage, use, side effects, and goals of treatment in detail.    Counseled patient on lifestyle modifications to help control hyperlipidemia.     Patient Treatment Goals:   LDL goal is less than 70    Followup at the next regular appointment.    Orders:  -     atorvastatin (LIPITOR) 80 MG tablet; Take 1 tab po daily for cholesterol  Dispense: 90 tablet; Refill: 1  -     ezetimibe (ZETIA) 10 MG tablet; Take 1 tablet by mouth Daily.  Dispense: 90 tablet; Refill: 1  -     CBC Auto Differential; Future  -     Comprehensive Metabolic Panel; Future  -     Lipid Panel; Future  -     TSH; Future  -     T4, Free; Future  -     CBC Auto Differential  -     Comprehensive Metabolic Panel  -     Lipid Panel  -     TSH  -     T4, Free    10. Anxiety and depression  Assessment & Plan:  Patient's depression is recurrent and is mild without psychosis. Their depression is currently in partial remission and the condition is improving with treatment. This will be reassessed at the next regular appointment. F/U as described:patient will continue current medication therapy.    Orders:  -     busPIRone (BUSPAR) 10 MG tablet; Take 1 tab po Bid for mood  Dispense: 180 tablet; Refill: 1  -     sertraline (ZOLOFT) 100 MG tablet; Take 2 tablets by mouth Every Night. TAKE ONE TABLET BY MOUTH DAILY FOR MOOD  Dispense: 180 tablet; Refill: 1    11. Moderate persistent asthma without complication  Assessment & Plan:  Asthma is improving with treatment.  The patient is experiencing monthly daytime asthma symptoms. He is experiencing no nighttime asthma symptoms.  Cont singulair, trelegy and prn Alb HFA        Orders:  -     albuterol sulfate  (90 Base) MCG/ACT inhaler; Inhale 2 puffs Every 4 (Four) Hours As Needed for Wheezing (emphysema).  Dispense: 18 g; Refill: 6  -      Fluticasone-Umeclidin-Vilant (Trelegy Ellipta) 100-62.5-25 MCG/ACT inhaler; Inhale 1 puff Daily.  Dispense: 3 each; Refill: 1  -     montelukast (SINGULAIR) 10 MG tablet; Take 1 tablet by mouth Every Night.  Dispense: 90 tablet; Refill: 1    12. Coronary artery disease of native artery of native heart with stable angina pectoris  Assessment & Plan:  Coronary artery disease is improving with treatment.  Continue current treatment regimen.  Cardiac status will be reassessed in 6 months.    Orders:  -     aspirin 81 MG EC tablet; Take 1 tablet by mouth Daily.  -     atorvastatin (LIPITOR) 80 MG tablet; Take 1 tab po daily for cholesterol  Dispense: 90 tablet; Refill: 1  -     ezetimibe (ZETIA) 10 MG tablet; Take 1 tablet by mouth Daily.  Dispense: 90 tablet; Refill: 1  -     CBC Auto Differential; Future  -     Comprehensive Metabolic Panel; Future  -     Lipid Panel; Future  -     TSH; Future  -     T4, Free; Future  -     CBC Auto Differential  -     Comprehensive Metabolic Panel  -     Lipid Panel  -     TSH  -     T4, Free    13. Prostate cancer screening  -     PSA Screen; Future  -     PSA Screen    14. Presence of cardiac pacemaker    15. Chronic heart failure with preserved ejection fraction (HFpEF)  Assessment & Plan:  Ongoing treatment with low-dose aspirin, atorvastatin, Zetia, Lasix, metoprolol, and Entresto.    Doing well after av node ablation and pacemaker placement    Ongoing followup with Dr Joaquin and dr Dutta    Orders:  -     potassium chloride 10 MEQ CR tablet; Take 2 tablets by mouth Daily.  Dispense: 180 tablet; Refill: 1  -     sacubitril-valsartan (Entresto) 49-51 MG tablet; Take 1 tablet by mouth 2 (Two) Times a Day.  Dispense: 180 tablet; Refill: 1    16. Class 2 severe obesity due to excess calories with serious comorbidity and body mass index (BMI) of 38.0 to 38.9 in adult  Assessment & Plan:  Patient's (Body mass index is 38.02 kg/m².) indicates that they are morbidly/severely obese (BMI  > 40 or > 35 with obesity - related health condition) with health conditions that include obstructive sleep apnea, hypertension, coronary heart disease, dyslipidemias, GERD, and osteoarthritis . Weight is unchanged. BMI  is above average; BMI management plan is completed. We discussed portion control and increasing exercise.       Other orders  -     Discontinue: sacubitril-valsartan (Entresto) 49-51 MG tablet; Take 1 tablet by mouth 2 (Two) Times a Day.  Dispense: 180 tablet; Refill: 1          Orders Placed This Encounter   Procedures    CBC Auto Differential     Standing Status:   Future     Number of Occurrences:   1     Standing Expiration Date:   8/5/2025     Order Specific Question:   Release to patient     Answer:   Routine Release [[id number]]    Comprehensive Metabolic Panel     Standing Status:   Future     Number of Occurrences:   1     Standing Expiration Date:   8/5/2025     Order Specific Question:   Release to patient     Answer:   Routine Release [[id number]]    Lipid Panel     Standing Status:   Future     Number of Occurrences:   1     Standing Expiration Date:   8/5/2025     Order Specific Question:   Release to patient     Answer:   Routine Release [[id number]]    TSH     Standing Status:   Future     Number of Occurrences:   1     Standing Expiration Date:   8/5/2025     Order Specific Question:   Release to patient     Answer:   Routine Release [[id number]]    T4, Free     Standing Status:   Future     Number of Occurrences:   1     Standing Expiration Date:   8/5/2025     Order Specific Question:   Release to patient     Answer:   Routine Release [[id number]]    PSA Screen     Standing Status:   Future     Number of Occurrences:   1     Standing Expiration Date:   8/5/2025     Order Specific Question:   Release to patient     Answer:   Routine Release [[id number]]    POCT glycated hemoglobin, total     Order Specific Question:   Release to patient     Answer:   Routine Release [[id number]]      New Medications Ordered This Visit   Medications    albuterol sulfate  (90 Base) MCG/ACT inhaler     Sig: Inhale 2 puffs Every 4 (Four) Hours As Needed for Wheezing (emphysema).     Dispense:  18 g     Refill:  6    apixaban (ELIQUIS) 5 MG tablet tablet     Sig: Take 1 tablet by mouth 2 (Two) Times a Day.     Dispense:  180 tablet     Refill:  1    aspirin 81 MG EC tablet     Sig: Take 1 tablet by mouth Daily.    atorvastatin (LIPITOR) 80 MG tablet     Sig: Take 1 tab po daily for cholesterol     Dispense:  90 tablet     Refill:  1    busPIRone (BUSPAR) 10 MG tablet     Sig: Take 1 tab po Bid for mood     Dispense:  180 tablet     Refill:  1    ezetimibe (ZETIA) 10 MG tablet     Sig: Take 1 tablet by mouth Daily.     Dispense:  90 tablet     Refill:  1    Fluticasone-Umeclidin-Vilant (Trelegy Ellipta) 100-62.5-25 MCG/ACT inhaler     Sig: Inhale 1 puff Daily.     Dispense:  3 each     Refill:  1    metFORMIN ER (GLUCOPHAGE-XR) 500 MG 24 hr tablet     Sig: Take 4 tablets by mouth Daily With Dinner.     Dispense:  360 tablet     Refill:  1    montelukast (SINGULAIR) 10 MG tablet     Sig: Take 1 tablet by mouth Every Night.     Dispense:  90 tablet     Refill:  1    pantoprazole (PROTONIX) 40 MG EC tablet     Sig: Take 1 tablet by mouth Daily.     Dispense:  90 tablet     Refill:  1    potassium chloride 10 MEQ CR tablet     Sig: Take 2 tablets by mouth Daily.     Dispense:  180 tablet     Refill:  1    sertraline (ZOLOFT) 100 MG tablet     Sig: Take 2 tablets by mouth Every Night. TAKE ONE TABLET BY MOUTH DAILY FOR MOOD     Dispense:  180 tablet     Refill:  1    sacubitril-valsartan (Entresto) 49-51 MG tablet     Sig: Take 1 tablet by mouth 2 (Two) Times a Day.     Dispense:  180 tablet     Refill:  1          Follow Up   Return in about 4 months (around 12/5/2024) for Med recheck.  Patient was given instructions and counseling regarding his condition or for health maintenance advice. Please see specific  information pulled into the AVS if appropriate.

## 2024-08-05 NOTE — ASSESSMENT & PLAN NOTE
Discussed together health maintenance and screening along with vaccination options and healthy diet and exercise habits as part of the preventative counseling at their physical exam today.     Encouraged past-due diabetic eye exam.    A1c returned good today at 5.9.  Awaiting fasting labs.    Encouraged vaccination update he will consider flu vaccination at the pharmacy.

## 2024-08-05 NOTE — ASSESSMENT & PLAN NOTE
Improved with current regimen.      Did have GI consult completed given thickening on CT scan of his distal esophagus.  He decided against endoscopic workup.  He did have a video swallow study with no evidence for aspiration.    Continue pantoprazole.

## 2024-08-05 NOTE — ASSESSMENT & PLAN NOTE
Patient's (Body mass index is 38.02 kg/m².) indicates that they are morbidly/severely obese (BMI > 40 or > 35 with obesity - related health condition) with health conditions that include obstructive sleep apnea, hypertension, coronary heart disease, dyslipidemias, GERD, and osteoarthritis . Weight is unchanged. BMI  is above average; BMI management plan is completed. We discussed portion control and increasing exercise.

## 2024-08-06 LAB
ALBUMIN SERPL-MCNC: 4.3 G/DL (ref 3.8–4.8)
ALP SERPL-CCNC: 92 IU/L (ref 44–121)
ALT SERPL-CCNC: 21 IU/L (ref 0–44)
AST SERPL-CCNC: 28 IU/L (ref 0–40)
BASOPHILS # BLD AUTO: 0 X10E3/UL (ref 0–0.2)
BASOPHILS NFR BLD AUTO: 1 %
BILIRUB SERPL-MCNC: 0.9 MG/DL (ref 0–1.2)
BUN SERPL-MCNC: 12 MG/DL (ref 8–27)
BUN/CREAT SERPL: 11 (ref 10–24)
CALCIUM SERPL-MCNC: 9.3 MG/DL (ref 8.6–10.2)
CHLORIDE SERPL-SCNC: 104 MMOL/L (ref 96–106)
CHOLEST SERPL-MCNC: 159 MG/DL (ref 100–199)
CO2 SERPL-SCNC: 24 MMOL/L (ref 20–29)
CREAT SERPL-MCNC: 1.06 MG/DL (ref 0.76–1.27)
EGFRCR SERPLBLD CKD-EPI 2021: 72 ML/MIN/1.73
EOSINOPHIL # BLD AUTO: 0.9 X10E3/UL (ref 0–0.4)
EOSINOPHIL NFR BLD AUTO: 11 %
ERYTHROCYTE [DISTWIDTH] IN BLOOD BY AUTOMATED COUNT: 12.4 % (ref 11.6–15.4)
GLOBULIN SER CALC-MCNC: 2.6 G/DL (ref 1.5–4.5)
GLUCOSE SERPL-MCNC: 121 MG/DL (ref 70–99)
HCT VFR BLD AUTO: 52.2 % (ref 37.5–51)
HDLC SERPL-MCNC: 44 MG/DL
HGB BLD-MCNC: 16.8 G/DL (ref 13–17.7)
IMM GRANULOCYTES # BLD AUTO: 0 X10E3/UL (ref 0–0.1)
IMM GRANULOCYTES NFR BLD AUTO: 0 %
LDLC SERPL CALC-MCNC: 87 MG/DL (ref 0–99)
LYMPHOCYTES # BLD AUTO: 2 X10E3/UL (ref 0.7–3.1)
LYMPHOCYTES NFR BLD AUTO: 26 %
MCH RBC QN AUTO: 30.8 PG (ref 26.6–33)
MCHC RBC AUTO-ENTMCNC: 32.2 G/DL (ref 31.5–35.7)
MCV RBC AUTO: 96 FL (ref 79–97)
MONOCYTES # BLD AUTO: 0.6 X10E3/UL (ref 0.1–0.9)
MONOCYTES NFR BLD AUTO: 7 %
NEUTROPHILS # BLD AUTO: 4.1 X10E3/UL (ref 1.4–7)
NEUTROPHILS NFR BLD AUTO: 55 %
PLATELET # BLD AUTO: 150 X10E3/UL (ref 150–450)
POTASSIUM SERPL-SCNC: 4.4 MMOL/L (ref 3.5–5.2)
PROT SERPL-MCNC: 6.9 G/DL (ref 6–8.5)
PSA SERPL-MCNC: 1.9 NG/ML (ref 0–4)
RBC # BLD AUTO: 5.46 X10E6/UL (ref 4.14–5.8)
SODIUM SERPL-SCNC: 144 MMOL/L (ref 134–144)
T4 FREE SERPL-MCNC: 1.05 NG/DL (ref 0.82–1.77)
TRIGL SERPL-MCNC: 160 MG/DL (ref 0–149)
TSH SERPL DL<=0.005 MIU/L-ACNC: 1.7 UIU/ML (ref 0.45–4.5)
VLDLC SERPL CALC-MCNC: 28 MG/DL (ref 5–40)
WBC # BLD AUTO: 7.6 X10E3/UL (ref 3.4–10.8)

## 2024-08-12 NOTE — PROGRESS NOTES
The message below may be given by the hub:    Please contact patient with [facility] lab result message.    There is a lab result message attached to their lab results... but I received notification that the results were not viewed within 5 days on [facility].  I need to make sure that they get their lab result message.    Thank you.

## 2024-08-22 ENCOUNTER — OFFICE VISIT (OUTPATIENT)
Dept: CARDIOLOGY | Facility: CLINIC | Age: 79
End: 2024-08-22
Payer: MEDICARE

## 2024-08-22 VITALS
OXYGEN SATURATION: 96 % | DIASTOLIC BLOOD PRESSURE: 60 MMHG | BODY MASS INDEX: 37.82 KG/M2 | WEIGHT: 264.2 LBS | HEART RATE: 101 BPM | SYSTOLIC BLOOD PRESSURE: 120 MMHG | HEIGHT: 70 IN

## 2024-08-22 DIAGNOSIS — Z95.0 PRESENCE OF CARDIAC PACEMAKER: Chronic | ICD-10-CM

## 2024-08-22 DIAGNOSIS — I44.2 COMPLETE AV BLOCK DUE TO AV NODAL ABLATION: ICD-10-CM

## 2024-08-22 DIAGNOSIS — I48.19 ATRIAL FIBRILLATION, PERSISTENT: Primary | Chronic | ICD-10-CM

## 2024-08-22 DIAGNOSIS — I10 PRIMARY HYPERTENSION: Chronic | ICD-10-CM

## 2024-08-22 DIAGNOSIS — I97.190 COMPLETE AV BLOCK DUE TO AV NODAL ABLATION: ICD-10-CM

## 2024-08-22 NOTE — PROGRESS NOTES
Cardiac Electrophysiology Outpatient Note  [facility]    Office Visit     Kerrie Medrano  [id number]  08/22/2024    Primary Care Physician: Jermaine Loepz MD    Referred By: No ref. provider found    Subjective     Chief Complaint   Patient presents with    Atrial fibrillation with RVR     Problem List:  Persistent atrial fibrillation/flutter  Diagnosis 10/2023 incidental EKG finding atrial fibrillation versus atrial flutter with RVR, RBBB  SONIA/ECV 11/2023 ([facility]) LVEF 50%  S/p successful ECV 1/2024 on sotalol 120 twice daily with recurrence of atrial flutter within a month  Failed Tikosyn therapy due to prolonged QT  EP study and RFA of AV node, new dual-chamber pacemaker placement Dr. Joaquin May 2, 2024  HFpEF  TTE 10/2023: Poor study no LVEF measurement  [facility] (Dr. Lira, 11/2023): Minor nonobstructive CAD, mid LAD diffuse 20%, dominant LCx with mid L PDA 40-50% lesion, normal nondominant small RCA.  LV pressures (S/D/B): 134/-1/7 mmHg  SONIA/ECV at [facility]   Nonobstructive CAD  Hypertension  Dyslipidemia  Chronic right bundle branch block  Type 2 diabetes  COPD on 3 L/min O2  BMI 39.5    History of Present Illness:   Kerrie Medrano is a 78 y.o. male who presents to my electrophysiology clinic for follow up of persistent atrial fibrillation/flutter now s/p AV node ablation with dual-chamber pacemaker with left bundle branch area lead.  The patient is very hard of hearing so communication was a little difficult, but he does report that his shortness of breath is much better since the ablation + pacemaker.  He has no other complaints today.  He surprisingly has very little atrial fibrillation on his device now.  He is getting around with a cane.    Past Medical History:   Diagnosis Date    Benign essential hypertension     Disorder of hair and hair follicles     Joint pain        Past Surgical History:   Procedure Laterality Date    APPENDECTOMY  1960     CARDIAC CATHETERIZATION      11/29/2023 PER DR. ARENAS    CARDIAC CATHETERIZATION Left 11/29/2023    Procedure: Cardiac Catheterization/Vascular Study;  Surgeon: Glen Arenas MD;  Location:  [facility];  Service: Cardiovascular;  Laterality: Left;    CARDIAC ELECTROPHYSIOLOGY PROCEDURE N/A 5/2/2024    Procedure: Pacemaker DC new;  Surgeon: Johann Joaquin MD;  Location:  [facility];  Service: Cardiology;  Laterality: N/A;    CARDIAC ELECTROPHYSIOLOGY PROCEDURE N/A 5/2/2024    Procedure: AV node Ablation;  Surgeon: Johann Joaquin MD;  Location:  [facility];  Service: Cardiology;  Laterality: N/A;    COLONOSCOPY  04/2009    COLON POLYP-20 CM INFLAMMATORY POLYP, PLAN: REPEAT IN 5 YEARS, NO FH OF COLON CANCER    KNEE SURGERY      ARTHROCENTESIS OF THE RIGHT KNEE MEDIAL JOINT LINE  AND LEFT KNEE    SKIN CANCER EXCISION         Family History   Problem Relation Age of Onset    Heart attack Mother     Heart attack Father     Skin cancer Brother 50    Heart attack Brother         X3       Social History     Socioeconomic History    Marital status:    Tobacco Use    Smoking status: Never     Passive exposure: Never    Smokeless tobacco: Current     Types: Chew   Vaping Use    Vaping status: Never Used   Substance and Sexual Activity    Alcohol use: Never    Drug use: Never    Sexual activity: Defer         Current Outpatient Medications:     albuterol sulfate  (90 Base) MCG/ACT inhaler, Inhale 2 puffs Every 4 (Four) Hours As Needed for Wheezing (emphysema)., Disp: 18 g, Rfl: 6    apixaban (ELIQUIS) 5 MG tablet tablet, Take 1 tablet by mouth 2 (Two) Times a Day., Disp: 180 tablet, Rfl: 1    aspirin 81 MG EC tablet, Take 1 tablet by mouth Daily., Disp: , Rfl:     atorvastatin (LIPITOR) 80 MG tablet, Take 1 tab po daily for cholesterol, Disp: 90 tablet, Rfl: 1    busPIRone (BUSPAR) 10 MG tablet, Take 1 tab po Bid for mood, Disp: 180 tablet, Rfl: 1     "Cholecalciferol (Vitamin D3) 50 MCG (2000 UT) capsule, Take 1 capsule by mouth Daily., Disp: , Rfl:     ezetimibe (ZETIA) 10 MG tablet, Take 1 tablet by mouth Daily., Disp: 90 tablet, Rfl: 1    Fluticasone-Umeclidin-Vilant (Trelegy Ellipta) 100-62.5-25 MCG/ACT inhaler, Inhale 1 puff Daily., Disp: 3 each, Rfl: 1    furosemide (LASIX) 40 MG tablet, Take 1 tablet by mouth 2 (Two) Times a Day., Disp: 60 tablet, Rfl: 5    metFORMIN ER (GLUCOPHAGE-XR) 500 MG 24 hr tablet, Take 4 tablets by mouth Daily With Dinner., Disp: 360 tablet, Rfl: 1    metoprolol succinate XL (TOPROL-XL) 25 MG 24 hr tablet, Take 1 tablet by mouth Daily., Disp: 90 tablet, Rfl: 3    montelukast (SINGULAIR) 10 MG tablet, Take 1 tablet by mouth Every Night., Disp: 90 tablet, Rfl: 1    pantoprazole (PROTONIX) 40 MG EC tablet, Take 1 tablet by mouth Daily., Disp: 90 tablet, Rfl: 1    potassium chloride 10 MEQ CR tablet, Take 2 tablets by mouth Daily., Disp: 180 tablet, Rfl: 1    sacubitril-valsartan (Entresto) 49-51 MG tablet, Take 1 tablet by mouth 2 (Two) Times a Day., Disp: 180 tablet, Rfl: 1    sertraline (ZOLOFT) 100 MG tablet, Take 2 tablets by mouth Every Night. TAKE ONE TABLET BY MOUTH DAILY FOR MOOD, Disp: 180 tablet, Rfl: 1    Allergies:   No Known Allergies    Objective   Vital Signs: Blood pressure 120/60, pulse 101, height 177.8 cm (70\"), weight 120 kg (264 lb 3.2 oz), SpO2 96%.    PHYSICAL EXAM  General appearance: Awake, alert, cooperative, very hard of hearing  Head: Normocephalic, without obvious abnormality, atraumatic  Lungs: Clear to ascultation bilaterally  Heart: Regular rate and rhythm, no murmurs, no lower extremity swelling  Skin: Skin color, turgor normal, no rashes or lesions  Neurologic: Grossly normal     Lab Results   Component Value Date    GLUCOSE 121 (H) 08/05/2024    CALCIUM 9.3 08/05/2024     08/05/2024    K 4.4 08/05/2024    CO2 24 08/05/2024     08/05/2024    BUN 12 08/05/2024    CREATININE 1.06 08/05/2024 " "   BCR 11 08/05/2024    ANIONGAP 9.0 05/03/2024     Lab Results   Component Value Date    WBC 7.6 08/05/2024    HGB 16.8 08/05/2024    HCT 52.2 (H) 08/05/2024    MCV 96 08/05/2024     08/05/2024     No results found for: \"INR\", \"PROTIME\"  Lab Results   Component Value Date    TSH 1.700 08/05/2024          Results for orders placed during the hospital encounter of 04/29/24    Adult Transthoracic Echo Complete W/ Cont if Necessary Per Protocol    Interpretation Summary    Left ventricular systolic function is normal.  Left ventricular ejection fraction appears to be 51 - 55%.    Left atrial volume is mildly increased.    Orderline dilation of the aortic root is present. Borderline dilation of the ascending aorta is present.     Results for orders placed during the hospital encounter of 11/29/23    Cardiac Catheterization/Vascular Study    Conclusion    Minor nonobstructive CAD, mid LAD diffuse 20%, dominant circumflex with a mid LPDA 40-50% lesion, normal nondominant small RCA    LV pressures (S/D/E) : 134/-1/7 mmHg      I personally viewed and interpreted the patient's EKG/Telemetry/lab data    Procedures    Kerrie Medrano  reports that he has never smoked. He has never been exposed to tobacco smoke. His smokeless tobacco use includes chew.        Advance Care Planning   Advance Care Planning: ACP discussion was declined by the patient. Patient does not have an advance directive, information provided.     Assessment & Plan    1. Atrial fibrillation, persistent  S/p pacemaker + AV node ablation with improvement in symptoms  Patient actually had <1% mode switching on today's device interrogation with longest 9-1/2 minutes.  Continue Eliquis for stroke prophylaxis.    2. 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 Presence of cardiac pacemaker  The patient's [employer] dual-chamber pacemaker with LBB area lead was interrogated and demonstrated normal function " with 9 years left on the battery, 32% atrial pacing, >99% ventricular pacing, threshold impedance values acceptable, underlying complete heart block and mode switching <1% longest 9.5 minutes.  No changes today       Follow Up:  Return in about 6 months (around 2/22/2025).      Thank you for allowing me to participate in the care of your patient. Please do not hesitate to contact me with additional questions or concerns.      Bart Barajas PA-C  Cardiac Electrophysiology  [facility] / [facility]

## 2024-09-27 ENCOUNTER — OFFICE VISIT (OUTPATIENT)
Dept: FAMILY MEDICINE CLINIC | Facility: CLINIC | Age: 79
End: 2024-09-27
Payer: MEDICARE

## 2024-09-27 ENCOUNTER — TELEPHONE (OUTPATIENT)
Dept: FAMILY MEDICINE CLINIC | Facility: CLINIC | Age: 79
End: 2024-09-27
Payer: MEDICARE

## 2024-09-27 VITALS
HEART RATE: 62 BPM | SYSTOLIC BLOOD PRESSURE: 120 MMHG | RESPIRATION RATE: 18 BRPM | HEIGHT: 70 IN | BODY MASS INDEX: 38.65 KG/M2 | OXYGEN SATURATION: 97 % | DIASTOLIC BLOOD PRESSURE: 76 MMHG | TEMPERATURE: 97.5 F | WEIGHT: 270 LBS

## 2024-09-27 DIAGNOSIS — S09.90XA INJURY OF HEAD, INITIAL ENCOUNTER: Primary | ICD-10-CM

## 2024-09-27 DIAGNOSIS — R51.9 ACUTE INTRACTABLE HEADACHE, UNSPECIFIED HEADACHE TYPE: ICD-10-CM

## 2024-09-27 DIAGNOSIS — R42 DIZZINESS: ICD-10-CM
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
## 2024-09-27 DIAGNOSIS — Z79.01 ANTICOAGULATED: ICD-10-CM

## 2024-09-27 DIAGNOSIS — M54.2 NECK PAIN: ICD-10-CM

## 2024-09-27 PROCEDURE — 99213 OFFICE O/P EST LOW 20 MIN: CPT | Performed by: FAMILY MEDICINE

## 2024-09-27 PROCEDURE — 1125F AMNT PAIN NOTED PAIN PRSNT: CPT | Performed by: FAMILY MEDICINE

## 2024-09-27 PROCEDURE — G2211 COMPLEX E/M VISIT ADD ON: HCPCS | Performed by: FAMILY MEDICINE

## 2024-09-27 PROCEDURE — 3078F DIAST BP <80 MM HG: CPT | Performed by: FAMILY MEDICINE

## 2024-09-27 PROCEDURE — 3074F SYST BP LT 130 MM HG: CPT | Performed by: FAMILY MEDICINE

## 2024-10-16 ENCOUNTER — OFFICE VISIT (OUTPATIENT)
Dept: CARDIOLOGY | Facility: CLINIC | Age: 79
End: 2024-10-16
Payer: MEDICARE

## 2024-10-16 VITALS
BODY MASS INDEX: 39.05 KG/M2 | HEIGHT: 70 IN | RESPIRATION RATE: 18 BRPM | WEIGHT: 272.8 LBS | SYSTOLIC BLOOD PRESSURE: 104 MMHG | DIASTOLIC BLOOD PRESSURE: 68 MMHG | OXYGEN SATURATION: 95 % | HEART RATE: 69 BPM

## 2024-10-16 DIAGNOSIS — I50.32 CHRONIC HEART FAILURE WITH PRESERVED EJECTION FRACTION (HFPEF): Primary | ICD-10-CM

## 2024-10-16 DIAGNOSIS — I48.19 ATRIAL FIBRILLATION, PERSISTENT: ICD-10-CM

## 2024-10-16 DIAGNOSIS — Z95.0 PRESENCE OF CARDIAC PACEMAKER: ICD-10-CM

## 2024-10-16 DIAGNOSIS — I25.118 CORONARY ARTERY DISEASE OF NATIVE ARTERY OF NATIVE HEART WITH STABLE ANGINA PECTORIS: ICD-10-CM

## 2024-10-16 DIAGNOSIS — E78.2 MIXED HYPERLIPIDEMIA: ICD-10-CM

## 2024-10-16 PROCEDURE — 1159F MED LIST DOCD IN RCRD: CPT | Performed by: INTERNAL MEDICINE

## 2024-10-16 PROCEDURE — 3078F DIAST BP <80 MM HG: CPT | Performed by: INTERNAL MEDICINE

## 2024-10-16 PROCEDURE — 99214 OFFICE O/P EST MOD 30 MIN: CPT | Performed by: INTERNAL MEDICINE

## 2024-10-16 PROCEDURE — 1160F RVW MEDS BY RX/DR IN RCRD: CPT | Performed by: INTERNAL MEDICINE

## 2024-10-16 PROCEDURE — 3074F SYST BP LT 130 MM HG: CPT | Performed by: INTERNAL MEDICINE

## 2024-10-16 RX ORDER — NITROGLYCERIN 0.4 MG/1
TABLET SUBLINGUAL
Qty: 100 TABLET | Refills: 11 | Status: SHIPPED | OUTPATIENT
Start: 2024-10-16

## 2024-10-16 RX ORDER — FUROSEMIDE 40 MG
40 TABLET ORAL DAILY
Qty: 60 TABLET | Refills: 5 | Status: SHIPPED | OUTPATIENT
Start: 2024-10-16

## 2024-10-16 NOTE — ASSESSMENT & PLAN NOTE
Patient shortness of breath is multifactorial related to COPD, sleep apnea possibly affecting RV, atrial fibrillation, HFpEF last known ejection fraction 50 to 55% on SONIA prior to cardioversion.  Patient seen prior AV node ablation and left bundle branch pacing, in view of difficult to control A-fib with RVR. Currently sinus rhythm with V pacing.  NYHA II, class C.  No volume overload.  BP and heart rate to goal on manual recheck.  Plan:  Continue optimal medical therapy for CHF  Decrease furosemide 40 mg once daily, additional tab as needed for swelling only in the afternoon  Follow-up labs next visit

## 2024-10-16 NOTE — PROGRESS NOTES
[facility]  [facility] CARDIOLOG[facility]  [address] [place] [address]  Dept: [phone]  Dept Fax: [phone]    Date: 10/16/2024  Patient: Kerrie Medrano  YOB: 1945    Follow Up Office Visit Note    Interval Follow-up  Mr. Kerrie Medrano is a 78 y.o. male who is here for follow-up on Congestive Heart Failure and Coronary Artery Disease.    Subjective   Patient complaining of chest pain 3 weeks ago sharp left upper shoulder associated with numbness in the left arm, nonexertional.  The pain resolved spontaneously after half an hour.  Patient has residual tenderness at the site of the pain.  Patient complaining of infrequent lightheadedness events at home. One episode of fall at nighttime, was grieving that he was following and fell down rolling out of the bed.  Patient denies angina, orthopnea, PND, palpitations, syncope or medications side-effects.    The following portions of the patient's history were reviewed and updated as appropriate: allergies, current medications, past family history, past medical history, past social history, past surgical history, and problem list.    Medications: No Known Allergies   Current Outpatient Medications   Medication Instructions    albuterol sulfate  (90 Base) MCG/ACT inhaler 2 puffs, Inhalation, Every 4 Hours PRN    apixaban (ELIQUIS) 5 mg, Oral, 2 Times Daily    aspirin 81 mg, Oral, Daily    atorvastatin (LIPITOR) 80 MG tablet Take 1 tab po daily for cholesterol    busPIRone (BUSPAR) 10 MG tablet Take 1 tab po Bid for mood    ezetimibe (ZETIA) 10 mg, Oral, Daily    Fluticasone-Umeclidin-Vilant (Trelegy Ellipta) 100-62.5-25 MCG/ACT inhaler 1 puff, Inhalation, Daily - RT    furosemide (LASIX) 40 mg, Oral, Daily, Additional tablet in PM as needed for swelling    metFORMIN ER (GLUCOPHAGE-XR) 2,000 mg, Oral, Daily With Dinner    metoprolol succinate XL (TOPROL-XL) 25 mg, Oral, Daily    montelukast (SINGULAIR) 10 mg, Oral,  "Nightly    nitroglycerin (NITROSTAT) 0.4 MG SL tablet 1 under the tongue as needed for angina, may repeat every 5 mins for up three doses.  If no resolution of chest pain, call 911 or head to the nearest emergency room.    pantoprazole (PROTONIX) 40 mg, Oral, Daily    potassium chloride 10 MEQ CR tablet 20 mEq, Oral, Daily    sacubitril-valsartan (Entresto) 49-51 MG tablet 1 tablet, Oral, 2 Times Daily    sertraline (ZOLOFT) 200 mg, Oral, Nightly, TAKE ONE TABLET BY MOUTH DAILY FOR MOOD    Vitamin D3 2,000 Units, Oral, Daily       Tobacco Use: High Risk (10/16/2024)    Patient History     Smoking Tobacco Use: Never     Smokeless Tobacco Use: Current     Passive Exposure: Current        Objective  Vitals:    10/16/24 1442 10/16/24 1508   BP: 138/74 104/68   BP Location:  Right arm   Patient Position:  Sitting   Cuff Size:  Large Adult   Pulse: 69 69   Resp: 18    SpO2: 95%    Weight: 124 kg (272 lb 12.8 oz)    Height: 177.8 cm (70\")    PainSc: 0-No pain       Vitals:    10/16/24 1442 10/16/24 1508   BP: 138/74 104/68   BP Location:  Right arm   Patient Position:  Sitting   Cuff Size:  Large Adult   Pulse: 69 69   Resp: 18    SpO2: 95%    Weight: 124 kg (272 lb 12.8 oz)    Height: 177.8 cm (70\")           Physical Exam  Constitutional:       Appearance: Healthy appearance. Not in distress.   Eyes:      Pupils: Pupils are equal, round, and reactive to light.   Neck:      Vascular: No JVR. JVD normal.   Pulmonary:      Effort: Pulmonary effort is normal.      Breath sounds: Normal breath sounds. No wheezing. No rhonchi. No rales.   Chest:      Chest wall: Tender to palpatation.          Comments: Tenderness  Cardiovascular:      PMI at left midclavicular line. Normal rate. Regular rhythm. Normal S1 with normal intensity. Normal S2 with normal intensity.       Murmurs: There is no murmur.      No gallop.  No click. No rub.      Comments: Poor lower extremity circulation  Pulses:     Intact distal pulses.   Edema:     " "Peripheral edema absent.   Abdominal:      General: There is no abdominal bruit.   Skin:     General: Skin is warm.   Neurological:      Mental Status: Alert and oriented to person, place and time.              Diagnostic Data  Lab Results   Component Value Date     08/05/2024    K 4.4 08/05/2024     08/05/2024    CO2 24 08/05/2024    MG 2.1 05/02/2024    BUN 12 08/05/2024    CREATININE 1.06 08/05/2024    CALCIUM 9.3 08/05/2024    BILITOT 0.9 08/05/2024    ALKPHOS 92 08/05/2024    ALT 21 08/05/2024    AST 28 08/05/2024    GLUCOSE 121 (H) 08/05/2024    ALBUMIN 4.3 08/05/2024     Lab Results   Component Value Date    WBC 7.6 08/05/2024    HGB 16.8 08/05/2024    HCT 52.2 (H) 08/05/2024     08/05/2024     No results found for: \"APTT\", \"INR\", \"PTT\"  Lab Results   Component Value Date    TROPONINT 14 10/11/2023     Lab Results   Component Value Date    PROBNP 640 (H) 10/11/2023       Lab Results   Component Value Date    CHLPL 159 08/05/2024    TRIG 160 (H) 08/05/2024    HDL 44 08/05/2024    LDL 87 08/05/2024     Lab Results   Component Value Date    TSH 1.700 08/05/2024    FREET4 1.05 08/05/2024       CV Diagnostics:  Procedures    CXR: Results for orders placed during the hospital encounter of 04/29/24    XR Chest PA & Lateral    Narrative  XR CHEST PA AND LATERAL    Date of Exam: 5/2/2024 8:38 PM EDT    Indication: post pacemaker    Comparison: Chest CT January 24, 2024, chest radiographs October 10, 2023    Findings:  Lateral view is limited by overlap of patient's arm. Lung volumes are low. No definite new focal or diffuse pulmonary infiltrate is identified.  No pleural effusion or pneumothorax is seen.    The heart is enlarged, as before. There is a new left chest wall pacemaker. Electrodes terminate in the area of the right ventricle and right atrial appendage. There is atherosclerosis of the aorta. Degenerative changes are present in the thoracic spine.    Impression  1.New left chest wall " pacemaker. No pneumothorax.  2.Stable cardiomegaly.  3.No definite radiographic findings of acute pulmonary abnormality.    Electronically Signed: Jermaine Bonilla  5/2/2024 8:53 PM EDT  Workstation ID: [id number]     ECHO/MUGA: Results for orders placed during the hospital encounter of 04/29/24    Adult Transthoracic Echo Complete W/ Cont if Necessary Per Protocol    Interpretation Summary    Left ventricular systolic function is normal.  Left ventricular ejection fraction appears to be 51 - 55%.    Left atrial volume is mildly increased.    Orderline dilation of the aortic root is present. Borderline dilation of the ascending aorta is present.     STRESS TESTS: Results for orders placed in visit on 10/25/23    Stress Test With Myocardial Perfusion One Day    Interpretation Summary    Left ventricular ejection fraction is borderline normal (Calculated EF = 50%). and dropps to 40% at stress    Myocardial perfusion imaging indicates a moderate-sized, moderate-to-severe area of ischemia located in the apex.    Impressions are consistent with a high risk study.    Findings consistent with an indeterminate ECG stress test.     CARDIAC CATH: Results for orders placed during the hospital encounter of 11/29/23    Cardiac Catheterization/Vascular Study    Conclusion    Minor nonobstructive CAD, mid LAD diffuse 20%, dominant circumflex with a mid LPDA 40-50% lesion, normal nondominant small RCA    LV pressures (S/D/E) : 134/-1/7 mmHg     DEVICES: No valid procedures specified.   HOLTER: No results found for this or any previous visit.     CT/MRI:  Results for orders placed in visit on 01/24/24    CT Angiogram Chest    Narrative  CT ANGIOGRAM CHEST    Date of Exam: 1/24/2024 2:55 PM EST    Indication: abnormal cxr, chronic sob, chest pain.    Comparison: None available.    Technique: CTA of the chest was performed before and after the uneventful intravenous administration of 90 cc Isovue-370. Reconstructed coronal and sagittal  images were also obtained. In addition, a 3-D volume rendered image was created for  interpretation. Automated exposure control and iterative reconstruction methods were used.      Findings:  There are no filling defects suspicious for pulmonary emboli. There are no enlarged mediastinal nodes. There are coronary calcifications. There is mild cardiomegaly. There are no pleural effusions. There is pulmonary vascular congestion with slightly  prominent interstitial pattern. There is no focal consolidation. There are no noncalcified pulmonary nodules or masses.    Impression  Impression:  Negative exam for pulmonary embolism.    Findings suggest CHF with mild pulmonary edema.        Electronically Signed: Jasmine Mcduffie MD  1/24/2024 3:54 PM EST  Workstation ID: [id number]    VASCULAR: No valid procedures specified.     Assessment and Plan  Diagnoses and all orders for this visit:    1. Chronic heart failure with preserved ejection fraction (HFpEF) (Primary)  Assessment & Plan:  Patient shortness of breath is multifactorial related to COPD, sleep apnea possibly affecting RV, atrial fibrillation, HFpEF last known ejection fraction 50 to 55% on SONIA prior to cardioversion.  Patient seen prior AV node ablation and left bundle branch pacing, in view of difficult to control A-fib with RVR. Currently sinus rhythm with V pacing.  NYHA II, class C.  No volume overload.  BP and heart rate to goal on manual recheck.  Plan:  Continue optimal medical therapy for CHF  Decrease furosemide 40 mg once daily, additional tab as needed for swelling only in the afternoon  Follow-up labs next visit    Orders:  -     furosemide (LASIX) 40 MG tablet; Take 1 tablet by mouth Daily. Additional tablet in PM as needed for swelling  Dispense: 60 tablet; Refill: 5    2. Coronary artery disease of native artery of native heart with stable angina pectoris  Assessment & Plan:  Mild coronary artery disease on recent left heart cath on 11/2023.  Coronary  artery disease is improving with treatment.  Recent episode of chest pain likely pacemaker related with tenderness at the site of the pain.  Nitroglycerin sublingual prescribed for diagnostic and therapeutic purposes.  Continue current treatment regimen with good blood pressure control and lipid-lowering therapy.  Cardiac status will be reassessed in 6 months.    Orders:  -     nitroglycerin (NITROSTAT) 0.4 MG SL tablet; 1 under the tongue as needed for angina, may repeat every 5 mins for up three doses.  If no resolution of chest pain, call 911 or head to the nearest emergency room.  Dispense: 100 tablet; Refill: 11    3. Atrial fibrillation, persistent  Assessment & Plan:  Past symptomatic atrial fibrillation, associated with shortness of breath and decreased excess capacity.  Cardioversion 1/14/2024 on sotalol and Eliquis.  Recurrence poorly tolerated.  Patient referred to EP with plan to change to Tikosyn, limited by QT prolongation.  Patient readmitted to the hospital for shortness of breath/exacerbation.  Patient underwent AV node ablation and dual-chamber pacemaker placement/left bundle branch pacing.  Plan:  Continue metoprolol succinate ER 25 mg p.o. daily in view of fast heart rate on device check   Continue Eliquis for anticoagulation  Blood pressure control      4. Mixed hyperlipidemia  Assessment & Plan:   Lipid abnormalities are improving with treatment     Plan:  Continue same medication/s without change.  Atorvastatin 80 mg p.o. daily, ezetimibe 10 mg p.o. daily     Discussed medication dosage, use, side effects, and goals of treatment in detail.    Counseled patient on lifestyle modifications to help control hyperlipidemia.      Lab Results   Component Value Date    LDL 87 08/05/2024     (H) 10/10/2023     (H) 07/03/2023    HDL 44 08/05/2024    HDL 49 10/10/2023    HDL 37 (L) 07/03/2023    CHLPL 159 08/05/2024    CHLPL 188 10/10/2023    CHLPL 245 (H) 07/03/2023    TRIG 160 (H)  08/05/2024    TRIG 114 10/10/2023    TRIG 267 (H) 07/03/2023       Patient Treatment Goals:   LDL goal is less than 70 for secondary prevention of mild coronary artery disease; near goal     Followup at the next regular appointment.      5. Presence of cardiac pacemaker  Assessment & Plan:  Dual-chamber pacemaker with left bundle branch pacing, [employer].  Underlying present at 40 bpm last check.  Continue routine device checks - due next visit.           Return in about 3 months (around 1/16/2025) for Follow-up with Dr Dutta.    Patient Instructions   Decrease furosemide 40 mg to 1 tab daily; take additional 1 tab in the afternoon as needed only for leg swelling    Vivek Dutta MD

## 2024-10-16 NOTE — PATIENT INSTRUCTIONS
Decrease furosemide 40 mg to 1 tab daily; take additional 1 tab in the afternoon as needed only for leg swelling

## 2024-10-16 NOTE — ASSESSMENT & PLAN NOTE
Dual-chamber pacemaker with left bundle branch pacing, [employer].  Underlying present at 40 bpm last check.  Continue routine device checks - due next visit.

## 2024-10-16 NOTE — ASSESSMENT & PLAN NOTE
Mild coronary artery disease on recent left heart cath on 11/2023.  Coronary artery disease is improving with treatment.  Recent episode of chest pain likely pacemaker related with tenderness at the site of the pain.  Nitroglycerin sublingual prescribed for diagnostic and therapeutic purposes.  Continue current treatment regimen with good blood pressure control and lipid-lowering therapy.  Cardiac status will be reassessed in 6 months.

## 2024-10-16 NOTE — ASSESSMENT & PLAN NOTE
Lipid abnormalities are improving with treatment     Plan:  Continue same medication/s without change.  Atorvastatin 80 mg p.o. daily, ezetimibe 10 mg p.o. daily     Discussed medication dosage, use, side effects, and goals of treatment in detail.    Counseled patient on lifestyle modifications to help control hyperlipidemia.      Lab Results   Component Value Date    LDL 87 08/05/2024     (H) 10/10/2023     (H) 07/03/2023    HDL 44 08/05/2024    HDL 49 10/10/2023    HDL 37 (L) 07/03/2023    CHLPL 159 08/05/2024    CHLPL 188 10/10/2023    CHLPL 245 (H) 07/03/2023    TRIG 160 (H) 08/05/2024    TRIG 114 10/10/2023    TRIG 267 (H) 07/03/2023       Patient Treatment Goals:   LDL goal is less than 70 for secondary prevention of mild coronary artery disease; near goal     Followup at the next regular appointment.

## 2024-10-16 NOTE — ASSESSMENT & PLAN NOTE
Past symptomatic atrial fibrillation, associated with shortness of breath and decreased excess capacity.  Cardioversion 1/14/2024 on sotalol and Eliquis.  Recurrence poorly tolerated.  Patient referred to EP with plan to change to Tikosyn, limited by QT prolongation.  Patient readmitted to the hospital for shortness of breath/exacerbation.  Patient underwent AV node ablation and dual-chamber pacemaker placement/left bundle branch pacing.  Plan:  Continue metoprolol succinate ER 25 mg p.o. daily in view of fast heart rate on device check   Continue Eliquis for anticoagulation  Blood pressure control

## 2024-11-07 ENCOUNTER — TELEPHONE (OUTPATIENT)
Dept: CARDIOLOGY | Facility: CLINIC | Age: 79
End: 2024-11-07
Payer: MEDICARE

## 2024-11-14 ENCOUNTER — TELEPHONE (OUTPATIENT)
Dept: CARDIOLOGY | Facility: CLINIC | Age: 79
End: 2024-11-14
Payer: MEDICARE

## 2024-11-14 NOTE — TELEPHONE ENCOUNTER
Spoke with Zulma, daughter, and she is going to work on getting the applications for Eliquis and Entresto completed and brought back for me to fax

## 2024-12-05 ENCOUNTER — OFFICE VISIT (OUTPATIENT)
Dept: FAMILY MEDICINE CLINIC | Facility: CLINIC | Age: 79
End: 2024-12-05
Payer: MEDICARE

## 2024-12-05 ENCOUNTER — TELEPHONE (OUTPATIENT)
Dept: FAMILY MEDICINE CLINIC | Facility: CLINIC | Age: 79
End: 2024-12-05

## 2024-12-05 VITALS
BODY MASS INDEX: 39.08 KG/M2 | WEIGHT: 273 LBS | SYSTOLIC BLOOD PRESSURE: 118 MMHG | HEART RATE: 62 BPM | DIASTOLIC BLOOD PRESSURE: 80 MMHG | HEIGHT: 70 IN | OXYGEN SATURATION: 95 %

## 2024-12-05 DIAGNOSIS — M15.0 PRIMARY OSTEOARTHRITIS INVOLVING MULTIPLE JOINTS: ICD-10-CM

## 2024-12-05 DIAGNOSIS — F32.A ANXIETY AND DEPRESSION: ICD-10-CM

## 2024-12-05 DIAGNOSIS — F41.9 ANXIETY AND DEPRESSION: ICD-10-CM

## 2024-12-05 DIAGNOSIS — R80.9 TYPE 2 DIABETES MELLITUS WITH DIABETIC MICROALBUMINURIA, WITHOUT LONG-TERM CURRENT USE OF INSULIN: Primary | ICD-10-CM

## 2024-12-05 DIAGNOSIS — E11.29 TYPE 2 DIABETES MELLITUS WITH DIABETIC MICROALBUMINURIA, WITHOUT LONG-TERM CURRENT USE OF INSULIN: Primary | ICD-10-CM

## 2024-12-05 DIAGNOSIS — Z95.0 PRESENCE OF CARDIAC PACEMAKER: ICD-10-CM

## 2024-12-05 DIAGNOSIS — R06.02 SHORTNESS OF BREATH: ICD-10-CM

## 2024-12-05 DIAGNOSIS — G47.33 OSA AND COPD OVERLAP SYNDROME: ICD-10-CM

## 2024-12-05 DIAGNOSIS — I25.118 CORONARY ARTERY DISEASE OF NATIVE ARTERY OF NATIVE HEART WITH STABLE ANGINA PECTORIS: ICD-10-CM

## 2024-12-05 DIAGNOSIS — J44.9 OSA AND COPD OVERLAP SYNDROME: ICD-10-CM

## 2024-12-05 DIAGNOSIS — I10 PRIMARY HYPERTENSION: ICD-10-CM

## 2024-12-05 DIAGNOSIS — I48.19 ATRIAL FIBRILLATION, PERSISTENT: ICD-10-CM

## 2024-12-05 DIAGNOSIS — K21.9 CHRONIC GERD: ICD-10-CM

## 2024-12-05 DIAGNOSIS — I50.32 CHRONIC HEART FAILURE WITH PRESERVED EJECTION FRACTION (HFPEF): ICD-10-CM

## 2024-12-05 DIAGNOSIS — E66.812 CLASS 2 SEVERE OBESITY DUE TO EXCESS CALORIES WITH SERIOUS COMORBIDITY AND BODY MASS INDEX (BMI) OF 39.0 TO 39.9 IN ADULT: ICD-10-CM

## 2024-12-05 DIAGNOSIS — E78.2 MIXED HYPERLIPIDEMIA: ICD-10-CM

## 2024-12-05 DIAGNOSIS — J45.40 MODERATE PERSISTENT ASTHMA WITHOUT COMPLICATION: ICD-10-CM

## 2024-12-05 DIAGNOSIS — Z23 NEEDS FLU SHOT: ICD-10-CM

## 2024-12-05 DIAGNOSIS — E66.01 CLASS 2 SEVERE OBESITY DUE TO EXCESS CALORIES WITH SERIOUS COMORBIDITY AND BODY MASS INDEX (BMI) OF 39.0 TO 39.9 IN ADULT: ICD-10-CM

## 2024-12-05 LAB
EXPIRATION DATE: ABNORMAL
HBA1C MFR BLD: 6 % (ref 4.5–5.7)
Lab: ABNORMAL

## 2024-12-05 PROCEDURE — 1126F AMNT PAIN NOTED NONE PRSNT: CPT | Performed by: FAMILY MEDICINE

## 2024-12-05 PROCEDURE — 3044F HG A1C LEVEL LT 7.0%: CPT | Performed by: FAMILY MEDICINE

## 2024-12-05 PROCEDURE — G0008 ADMIN INFLUENZA VIRUS VAC: HCPCS | Performed by: FAMILY MEDICINE

## 2024-12-05 PROCEDURE — 83036 HEMOGLOBIN GLYCOSYLATED A1C: CPT | Performed by: FAMILY MEDICINE

## 2024-12-05 PROCEDURE — 3074F SYST BP LT 130 MM HG: CPT | Performed by: FAMILY MEDICINE

## 2024-12-05 PROCEDURE — 90662 IIV NO PRSV INCREASED AG IM: CPT | Performed by: FAMILY MEDICINE

## 2024-12-05 PROCEDURE — 99214 OFFICE O/P EST MOD 30 MIN: CPT | Performed by: FAMILY MEDICINE

## 2024-12-05 PROCEDURE — 3079F DIAST BP 80-89 MM HG: CPT | Performed by: FAMILY MEDICINE

## 2024-12-05 RX ORDER — ATORVASTATIN CALCIUM 80 MG/1
TABLET, FILM COATED ORAL
Qty: 90 TABLET | Refills: 1 | Status: SHIPPED | OUTPATIENT
Start: 2024-12-05

## 2024-12-05 RX ORDER — ASPIRIN 81 MG/1
81 TABLET ORAL DAILY
Start: 2024-12-05

## 2024-12-05 RX ORDER — SACUBITRIL AND VALSARTAN 49; 51 MG/1; MG/1
1 TABLET, FILM COATED ORAL 2 TIMES DAILY
Qty: 180 TABLET | Refills: 1 | Status: SHIPPED | OUTPATIENT
Start: 2024-12-05

## 2024-12-05 RX ORDER — POTASSIUM CHLORIDE 750 MG/1
20 TABLET, EXTENDED RELEASE ORAL DAILY
Qty: 180 TABLET | Refills: 1 | Status: SHIPPED | OUTPATIENT
Start: 2024-12-05

## 2024-12-05 RX ORDER — METFORMIN HYDROCHLORIDE 500 MG/1
2000 TABLET, EXTENDED RELEASE ORAL
Qty: 360 TABLET | Refills: 1 | Status: SHIPPED | OUTPATIENT
Start: 2024-12-05

## 2024-12-05 RX ORDER — MONTELUKAST SODIUM 10 MG/1
10 TABLET ORAL NIGHTLY
Qty: 90 TABLET | Refills: 1 | Status: SHIPPED | OUTPATIENT
Start: 2024-12-05

## 2024-12-05 RX ORDER — EZETIMIBE 10 MG/1
10 TABLET ORAL DAILY
Qty: 90 TABLET | Refills: 1 | Status: SHIPPED | OUTPATIENT
Start: 2024-12-05

## 2024-12-05 RX ORDER — SERTRALINE HYDROCHLORIDE 100 MG/1
200 TABLET, FILM COATED ORAL NIGHTLY
Qty: 180 TABLET | Refills: 1 | Status: SHIPPED | OUTPATIENT
Start: 2024-12-05 | End: 2024-12-05 | Stop reason: SDUPTHER

## 2024-12-05 RX ORDER — PANTOPRAZOLE SODIUM 40 MG/1
40 TABLET, DELAYED RELEASE ORAL DAILY
Qty: 90 TABLET | Refills: 1 | Status: SHIPPED | OUTPATIENT
Start: 2024-12-05

## 2024-12-05 RX ORDER — SERTRALINE HYDROCHLORIDE 100 MG/1
200 TABLET, FILM COATED ORAL NIGHTLY
Qty: 180 TABLET | Refills: 1 | Status: SHIPPED | OUTPATIENT
Start: 2024-12-05

## 2024-12-05 RX ORDER — ALBUTEROL SULFATE 90 UG/1
2 INHALANT RESPIRATORY (INHALATION) EVERY 4 HOURS PRN
Qty: 18 G | Refills: 6 | Status: SHIPPED | OUTPATIENT
Start: 2024-12-05

## 2024-12-05 RX ORDER — BUSPIRONE HYDROCHLORIDE 10 MG/1
TABLET ORAL
Qty: 180 TABLET | Refills: 1 | Status: SHIPPED | OUTPATIENT
Start: 2024-12-05

## 2024-12-05 NOTE — ASSESSMENT & PLAN NOTE
Patient's (Body mass index is 39.17 kg/m².) indicates that they are morbidly/severely obese (BMI > 40 or > 35 with obesity - related health condition) with health conditions that include obstructive sleep apnea, hypertension, coronary heart disease, dyslipidemias, GERD, and osteoarthritis . Weight is unchanged. BMI  is above average; BMI management plan is completed. We discussed portion control and increasing exercise.

## 2024-12-05 NOTE — TELEPHONE ENCOUNTER
Pharmacy Name:  [facility]    Pharmacy representative name: ESAU    Pharmacy representative phone number: [phone]    What medication are you calling in regards to: sertraline (ZOLOFT) 100 MG tablet     What question does the pharmacy have: CLARIFY DIRECTIONS     Summary: Take 2 tablets by mouth Every Night. TAKE ONE TABLET BY MOUTH DAILY FOR MOOD    Patient Sig: Take 2 tablets by mouth Every Night. TAKE ONE TABLET BY MOUTH DAILY FOR MOOD        Who is the provider that prescribed the medication: Jermaine Lopez MD     Additional notes: PLEASE CALL OR SEND IN A NEW SCRIPT FOR ZOLOFT

## 2024-12-05 NOTE — TELEPHONE ENCOUNTER
Please clarify how patient should take medication,  thank you --     From med list     Sig: Take 2 tablets by mouth Every Night. TAKE ONE TABLET BY MOUTH DAILY FOR MOOD

## 2024-12-05 NOTE — ASSESSMENT & PLAN NOTE
Asthma is improving with treatment.  The patient is experiencing monthly daytime asthma symptoms. He is experiencing no nighttime asthma symptoms.  Cont singulair, trelegy and prn Alb HFA    FLU shot given today

## 2024-12-05 NOTE — ASSESSMENT & PLAN NOTE
Continue Tylenol     Off NSAIDs and celebrex given Eliquis for Afib     Plans for R knee replacement in 2025 with Dr Red, does have cardiac clearance (per pt)

## 2024-12-05 NOTE — ASSESSMENT & PLAN NOTE
Cont SALIMA treatment with CPAP     How Severe Is It?: moderate Is This A New Presentation, Or A Follow-Up?: Rash

## 2024-12-05 NOTE — PROGRESS NOTES
Chief Complaint  Diabetes, coronary artery disease, history of atrial fibrillation with rapid ventricular response, cardiomyopathy, hypertension, hyperlipidemia, anxiety, insomnia, osteoarthritis, chronic GERD    Subjective    History of Present Illness:  Kerrie Medrano is a 79 y.o. male who presents today for followup visit and medication refills    Doing well since our visit in August for physical and AWV    He continues to feel much better after placement of his pacemaker and continues to follow-up with Dr. Joaquin and Dr. Alexandre.    He does have ongoing problems with GERD and symptomatic hiatal hernia.  Overall much improved with Protonix.  He did complete a video swallow study without evidence for aspiration earlier this year.  He also had consultation with GI to follow-up on his CT findings of esophageal thickening and overall has been doing well with pantoprazole.    He does continue on low-dose aspirin, Zetia, metoprolol, Entresto, and atorvastatin along with Eliquis given history of coronary artery disease, cardiomyopathy, and atrial fibrillation with rapid ventricular response in the past.    No bleeding problems on his current regimen.    Willing for flu shot today     Encouraged past-due diabetic eye exam.    A1c today doing well at 5.9 with his last visit on metformin alone.  Repeat A1c today: 6.0    Using cane for ambulatory support given his ongoing problems with bilateral knee osteoarthritis.  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  Plans for R TKA with Dr Red but does not have it scheduled - did get clearance from cardiology (Per pt)    Rolled out of bed but no injury - did get CT workup in the ER without any problems/bleed.     Environmental asthma and chronic cough remains manageable with trilogy and as needed albuterol.  He does also continue on Singulair.    Anxiety and depression with insomnia remains well-controlled on Zoloft with BuSpar        Objective  "  Vital Signs:   /80   Pulse 62   Ht 177.8 cm (70\")   Wt 124 kg (273 lb)   SpO2 95%   BMI 39.17 kg/m²     Review of Systems   Constitutional:  Negative for appetite change, chills and fever.   HENT:  Negative for hearing loss.    Eyes:  Negative for blurred vision.   Respiratory:  Negative for chest tightness.    Cardiovascular:  Negative for chest pain.   Gastrointestinal:  Negative for abdominal pain.   Musculoskeletal:  Positive for arthralgias and gait problem.   Skin:  Negative for rash.   Psychiatric/Behavioral:  Negative for depressed mood.        Past History:  Medical History: has a past medical history of Benign essential hypertension, Disorder of hair and hair follicles, and Joint pain.   Surgical History: has a past surgical history that includes Colonoscopy (04/2009); Skin cancer excision; Knee surgery; Appendectomy (1960); Cardiac catheterization; Cardiac catheterization (Left, 11/29/2023); Cardiac electrophysiology procedure (N/A, 5/2/2024); and Cardiac electrophysiology procedure (N/A, 5/2/2024).   Family History: family history includes Heart attack in his brother, father, and mother; Skin cancer (age of onset: 50) in his brother.   Social History: reports that he has never smoked. He has been exposed to tobacco smoke. His smokeless tobacco use includes chew. He reports that he does not drink alcohol and does not use drugs.      Current Outpatient Medications:     albuterol sulfate  (90 Base) MCG/ACT inhaler, Inhale 2 puffs Every 4 (Four) Hours As Needed for Wheezing (emphysema)., Disp: 18 g, Rfl: 6    apixaban (ELIQUIS) 5 MG tablet tablet, Take 1 tablet by mouth 2 (Two) Times a Day., Disp: 180 tablet, Rfl: 1    aspirin 81 MG EC tablet, Take 1 tablet by mouth Daily., Disp: , Rfl:     atorvastatin (LIPITOR) 80 MG tablet, Take 1 tab po daily for cholesterol, Disp: 90 tablet, Rfl: 1    busPIRone (BUSPAR) 10 MG tablet, Take 1 tab po Bid for mood, Disp: 180 tablet, Rfl: 1    " Cholecalciferol (Vitamin D3) 50 MCG (2000 UT) capsule, Take 1 capsule by mouth Daily., Disp: , Rfl:     ezetimibe (ZETIA) 10 MG tablet, Take 1 tablet by mouth Daily., Disp: 90 tablet, Rfl: 1    Fluticasone-Umeclidin-Vilant (Trelegy Ellipta) 100-62.5-25 MCG/ACT inhaler, Inhale 1 puff Daily., Disp: 3 each, Rfl: 1    furosemide (LASIX) 40 MG tablet, Take 1 tablet by mouth Daily. Additional tablet in PM as needed for swelling, Disp: 60 tablet, Rfl: 5    metFORMIN ER (GLUCOPHAGE-XR) 500 MG 24 hr tablet, Take 4 tablets by mouth Daily With Dinner., Disp: 360 tablet, Rfl: 1    metoprolol succinate XL (TOPROL-XL) 25 MG 24 hr tablet, Take 1 tablet by mouth Daily., Disp: 90 tablet, Rfl: 3    montelukast (SINGULAIR) 10 MG tablet, Take 1 tablet by mouth Every Night., Disp: 90 tablet, Rfl: 1    nitroglycerin (NITROSTAT) 0.4 MG SL tablet, 1 under the tongue as needed for angina, may repeat every 5 mins for up three doses.  If no resolution of chest pain, call 911 or head to the nearest emergency room., Disp: 100 tablet, Rfl: 11    pantoprazole (PROTONIX) 40 MG EC tablet, Take 1 tablet by mouth Daily., Disp: 90 tablet, Rfl: 1    potassium chloride 10 MEQ CR tablet, Take 2 tablets by mouth Daily., Disp: 180 tablet, Rfl: 1    sacubitril-valsartan (Entresto) 49-51 MG tablet, Take 1 tablet by mouth 2 (Two) Times a Day., Disp: 180 tablet, Rfl: 1    sertraline (ZOLOFT) 100 MG tablet, Take 2 tablets by mouth Every Night. TAKE ONE TABLET BY MOUTH DAILY FOR MOOD, Disp: 180 tablet, Rfl: 1    Allergies: Patient has no known allergies.    Physical Exam  Constitutional:       Appearance: He is obese.   HENT:      Head: Normocephalic.      Right Ear: External ear normal.      Left Ear: External ear normal.      Nose: Nose normal.   Eyes:      Pupils: Pupils are equal, round, and reactive to light.   Cardiovascular:      Rate and Rhythm: Normal rate and regular rhythm.      Heart sounds: Normal heart sounds.   Pulmonary:      Effort: Pulmonary  effort is normal.      Breath sounds: Normal breath sounds.   Musculoskeletal:      Comments: Unchanged OA changes, no inflammatory joint changes or tophi    Worsening R knee pain - has plans for knee replacement and did get cardiac clearance.    Skin:     General: Skin is warm and dry.   Neurological:      General: No focal deficit present.      Mental Status: He is alert and oriented to person, place, and time.      Gait: Gait abnormal.      Comments: Using cane for support    Psychiatric:         Mood and Affect: Mood normal.         Behavior: Behavior normal.         Thought Content: Thought content normal.          Result Review                   Assessment and Plan  Diagnoses and all orders for this visit:    1. Type 2 diabetes mellitus with diabetic microalbuminuria, without long-term current use of insulin (Primary)  Assessment & Plan:  Diabetes is improving with treatment.   Continue current treatment regimen.  Regular aerobic exercise.  Reminded to get yearly retinal exam.  Diabetes will be reassessed  4 mos    Orders:  -     POCT glycated hemoglobin, total  -     aspirin 81 MG EC tablet; Take 1 tablet by mouth Daily.  -     metFORMIN ER (GLUCOPHAGE-XR) 500 MG 24 hr tablet; Take 4 tablets by mouth Daily With Dinner.  Dispense: 360 tablet; Refill: 1    2. Primary hypertension  Assessment & Plan:  Hypertension is stable and controlled  Continue current treatment regimen.  Weight loss.  Regular aerobic exercise.  Blood pressure will be reassessed in 3 months.      3. Atrial fibrillation, persistent  Assessment & Plan:  Doing well after av node ablation and pacemaker placement    Ongoing followup with Dr Joaquin and Dr Dutta    Orders:  -     apixaban (ELIQUIS) 5 MG tablet tablet; Take 1 tablet by mouth 2 (Two) Times a Day.  Dispense: 180 tablet; Refill: 1    4. Shortness of breath  Assessment & Plan:  Significantly improved after AV node ablation and pacemaker!       5. SALIMA and COPD overlap  syndrome  Assessment & Plan:  Cont SALIMA treatment with CPAP        6. Chronic GERD  Assessment & Plan:  Improved with current regimen.      Did have GI consult completed given thickening on CT scan of his distal esophagus.  He decided against endoscopic workup.  He did have a video swallow study with no evidence for aspiration.    Continue pantoprazole.    Orders:  -     pantoprazole (PROTONIX) 40 MG EC tablet; Take 1 tablet by mouth Daily.  Dispense: 90 tablet; Refill: 1    7. Primary osteoarthritis involving multiple joints  Assessment & Plan:  Continue Tylenol     Off NSAIDs and celebrex given Eliquis for Afib     Plans for R knee replacement in 2025 with Dr Red, does have cardiac clearance (per pt)       8. Mixed hyperlipidemia  Assessment & Plan:   Lipid abnormalities are improving with treatment    Plan:  Continue same medication/s without change.      Discussed medication dosage, use, side effects, and goals of treatment in detail.    Counseled patient on lifestyle modifications to help control hyperlipidemia.     Patient Treatment Goals:   LDL goal is less than 70    Followup at the next regular appointment.    Orders:  -     atorvastatin (LIPITOR) 80 MG tablet; Take 1 tab po daily for cholesterol  Dispense: 90 tablet; Refill: 1  -     ezetimibe (ZETIA) 10 MG tablet; Take 1 tablet by mouth Daily.  Dispense: 90 tablet; Refill: 1    9. Anxiety and depression  Assessment & Plan:  Patient's depression is recurrent and is mild without psychosis. Their depression is currently in partial remission and the condition is improving with treatment. This will be reassessed at the next regular appointment. F/U as described:patient will continue current medication therapy.    Orders:  -     busPIRone (BUSPAR) 10 MG tablet; Take 1 tab po Bid for mood  Dispense: 180 tablet; Refill: 1  -     sertraline (ZOLOFT) 100 MG tablet; Take 2 tablets by mouth Every Night. TAKE ONE TABLET BY MOUTH DAILY FOR MOOD  Dispense: 180 tablet;  Refill: 1    10. Moderate persistent asthma without complication  Assessment & Plan:  Asthma is improving with treatment.  The patient is experiencing monthly daytime asthma symptoms. He is experiencing no nighttime asthma symptoms.  Cont singulair, trelegy and prn Alb HFA    FLU shot given today         Orders:  -     albuterol sulfate  (90 Base) MCG/ACT inhaler; Inhale 2 puffs Every 4 (Four) Hours As Needed for Wheezing (emphysema).  Dispense: 18 g; Refill: 6  -     Fluticasone-Umeclidin-Vilant (Trelegy Ellipta) 100-62.5-25 MCG/ACT inhaler; Inhale 1 puff Daily.  Dispense: 3 each; Refill: 1  -     montelukast (SINGULAIR) 10 MG tablet; Take 1 tablet by mouth Every Night.  Dispense: 90 tablet; Refill: 1    11. Coronary artery disease of native artery of native heart with stable angina pectoris  Assessment & Plan:  Coronary artery disease is improving with treatment.  Continue current treatment regimen.  Cardiac status will be reassessed in 6 months.    Orders:  -     aspirin 81 MG EC tablet; Take 1 tablet by mouth Daily.  -     atorvastatin (LIPITOR) 80 MG tablet; Take 1 tab po daily for cholesterol  Dispense: 90 tablet; Refill: 1  -     ezetimibe (ZETIA) 10 MG tablet; Take 1 tablet by mouth Daily.  Dispense: 90 tablet; Refill: 1    12. Chronic heart failure with preserved ejection fraction (HFpEF)  Assessment & Plan:  Ongoing treatment with low-dose aspirin, atorvastatin, Zetia, Lasix, metoprolol, and Entresto.    Doing well after av node ablation and pacemaker placement    Ongoing followup with Dr Joaquin and Dr Dutta    Orders:  -     potassium chloride 10 MEQ CR tablet; Take 2 tablets by mouth Daily.  Dispense: 180 tablet; Refill: 1  -     sacubitril-valsartan (Entresto) 49-51 MG tablet; Take 1 tablet by mouth 2 (Two) Times a Day.  Dispense: 180 tablet; Refill: 1    13. Presence of cardiac pacemaker  Assessment & Plan:  Doing well with ongoing cardiology followup      14. Needs flu shot  -     Fluzone  High-Dose 65+yrs (6726-4753)    15. Class 2 severe obesity due to excess calories with serious comorbidity and body mass index (BMI) of 39.0 to 39.9 in adult  Assessment & Plan:  Patient's (Body mass index is 39.17 kg/m².) indicates that they are morbidly/severely obese (BMI > 40 or > 35 with obesity - related health condition) with health conditions that include obstructive sleep apnea, hypertension, coronary heart disease, dyslipidemias, GERD, and osteoarthritis . Weight is unchanged. BMI  is above average; BMI management plan is completed. We discussed portion control and increasing exercise.                      Follow Up  Return in about 4 months (around 4/5/2025) for Med recheck.    Jermaine Lopez MD

## 2025-03-03 DIAGNOSIS — I50.32 CHRONIC HEART FAILURE WITH PRESERVED EJECTION FRACTION (HFPEF): ICD-10-CM

## 2025-03-03 RX ORDER — POTASSIUM CHLORIDE 750 MG/1
20 TABLET, EXTENDED RELEASE ORAL DAILY
Qty: 180 TABLET | Refills: 1 | Status: SHIPPED | OUTPATIENT
Start: 2025-03-03

## 2025-06-23 DIAGNOSIS — F41.9 ANXIETY AND DEPRESSION: ICD-10-CM

## 2025-06-23 DIAGNOSIS — F32.A ANXIETY AND DEPRESSION: ICD-10-CM

## 2025-06-23 RX ORDER — SERTRALINE HYDROCHLORIDE 100 MG/1
TABLET, FILM COATED ORAL
Qty: 180 TABLET | Refills: 1 | Status: SHIPPED | OUTPATIENT
Start: 2025-06-23

## 2025-06-30 ENCOUNTER — RESULTS FOLLOW-UP (OUTPATIENT)
Dept: FAMILY MEDICINE CLINIC | Facility: CLINIC | Age: 80
End: 2025-06-30
Payer: MEDICARE

## 2025-06-30 DIAGNOSIS — N28.89 RIGHT RENAL MASS: Primary | ICD-10-CM

## 2025-06-30 NOTE — TELEPHONE ENCOUNTER
HUB TO RELAY:    Please call patient and let him know that we did receive a copy of his CT scan from the emergency room and there was an area that was not well-characterized on his CT scan in the upper pole of the right kidney.     They did recommend consideration for a ultrasound to reevaluate this area and I did place an order to get a right kidney ultrasound done for further evaluation of the area seen incidentally on his CT scan.     Please make sure that he keeps his follow-up visit with me in August as scheduled and we will notify him of the results of the ultrasound as soon as it becomes available.       1ST ATTEMPT: STEVE

## 2025-06-30 NOTE — TELEPHONE ENCOUNTER
Name: Means,April    Relationship: Emergency Contact    Best Callback Number: [phone]     HUB PROVIDED THE RELAY MESSAGE FROM THE OFFICE   PATIENT VOICED UNDERSTANDING AND HAS NO FURTHER QUESTIONS AT THIS TIME      Carolina Ortiz MA Medical Assistant Signed 2050       HUB TO RELAY:     Please call patient and let him know that we did receive a copy of his CT scan from the emergency room and there was an area that was not well-characterized on his CT scan in the upper pole of the right kidney.     They did recommend consideration for a ultrasound to reevaluate this area and I did place an order to get a right kidney ultrasound done for further evaluation of the area seen incidentally on his CT scan.     Please make sure that he keeps his follow-up visit with me in August as scheduled and we will notify him of the results of the ultrasound as soon as it becomes available.        1ST ATTEMPT: STEVE

## 2025-06-30 NOTE — PROGRESS NOTES
THE MESSAGE BELOW IS ABLE TO BE GIVEN BY THE HUB.  THE HUB MAY SCHEDULE A FOLLOW-UP VISIT FOR THE PATIENT IF INDICATED IN THE MESSAGE BELOW...      Please call patient and let him know that we did receive a copy of his CT scan from the emergency room and there was an area that was not well-characterized on his CT scan in the upper pole of the right kidney.    They did recommend consideration for a ultrasound to reevaluate this area and I did place an order to get a right kidney ultrasound done for further evaluation of the area seen incidentally on his CT scan.    Please make sure that he keeps his follow-up visit with me in August as scheduled and we will notify him of the results of the ultrasound as soon as it becomes available.

## 2025-07-02 ENCOUNTER — READMISSION MANAGEMENT (OUTPATIENT)
Dept: CALL CENTER | Facility: HOSPITAL | Age: 80
End: 2025-07-02
Payer: MEDICARE

## 2025-07-02 ENCOUNTER — TELEPHONE (OUTPATIENT)
Dept: FAMILY MEDICINE CLINIC | Facility: CLINIC | Age: 80
End: 2025-07-02
Payer: MEDICARE

## 2025-07-02 ENCOUNTER — TRANSITIONAL CARE MANAGEMENT TELEPHONE ENCOUNTER (OUTPATIENT)
Dept: CALL CENTER | Facility: HOSPITAL | Age: 80
End: 2025-07-02
Payer: MEDICARE

## 2025-07-02 NOTE — PROGRESS NOTES
Pt's daughter advised there are no sooner appointments and they should consider Urgent care or ER. She states she was not happy with the care at [facility]. I advised Scientologist or  ER for any future matters. She was agreeable.

## 2025-07-02 NOTE — OUTREACH NOTE
Prep Survey      Flowsheet Row Responses   [facility] facility patient discharged from? Non-[facility]   Is LACE score < 7 ? Non-BH Discharge   Eligibility [facility]   Date of Discharge 06/30/25   Discharge Disposition Home or Self Care   Discharge diagnosis Breathing problems   Does the patient have one of the following disease processes/diagnoses(primary or secondary)? Other   Prep survey completed? Yes            Sujey RODAS - Registered Nurse

## 2025-07-02 NOTE — TELEPHONE ENCOUNTER
Caller: April Means    Relationship to patient: Daughter    Best call back number: [phone]    New or established patient?  [] New    [x] Established    Date of discharge: 6/30/25    Facility discharged from: [facility]    Diagnosis/Symptoms: Breathing problems    Length of stay (If applicable): 6/27/25 - 6/30/25

## 2025-07-02 NOTE — OUTREACH NOTE
Call Center TCM Note      Flowsheet Row Responses   [facility] patient discharged from? Non-  [D/c'd from [facility]]   Does the patient have one of the following disease processes/diagnoses(primary or secondary)? Other   TCM attempt successful? Yes   Call start time 1412   Call end time 1417   Discharge diagnosis Breathing problems   Meds reviewed with patient/caregiver? Yes   Is the patient having any side effects they believe may be caused by any medication additions or changes? No   Does the patient have all medications ordered at discharge? Yes   Is the patient taking all medications as directed (includes completed medication regime)? Yes   Comments [facility] d/c follow up 7/7/25@1515.   Does the patient have an appointment with their PCP within 7-14 days of discharge? Yes   Has home health visited the patient within 72 hours of discharge? N/A   Psychosocial issues? No   Did the patient receive a copy of their discharge instructions? Yes   What is the patient's perception of their health status since discharge? Same  [Patient reports he is still having pain in back and legs]   Is the patient/caregiver able to teach back signs and symptoms related to disease process for when to call PCP? Yes   Is the patient/caregiver able to teach back signs and symptoms related to disease process for when to call 911? Yes   Is the patient/caregiver able to teach back the hierarchy of who to call/visit for symptoms/problems? PCP, Specialist, Home health nurse, Urgent Care, ED, 911 Yes   TCM call completed? Yes   Wrap up additional comments Patient reports he is not doing well, does not feel he should have been d/c'd. He states he is still having back/leg pain and difficulty breathing due to COPD. Encouraged returning to ED if no improvement.   Call end time 1417   Would this patient benefit from a Referral to [facility] Social Work? No   Is the patient interested in additional calls from an ambulatory case  manager? No            Adrianna MARIN - Registered Nurse    7/2/2025, 14:18 EDT

## 2025-07-07 ENCOUNTER — OFFICE VISIT (OUTPATIENT)
Dept: FAMILY MEDICINE CLINIC | Facility: CLINIC | Age: 80
End: 2025-07-07
Payer: MEDICARE

## 2025-07-07 ENCOUNTER — TELEPHONE (OUTPATIENT)
Dept: CASE MANAGEMENT | Facility: OTHER | Age: 80
End: 2025-07-07
Payer: MEDICARE

## 2025-07-07 VITALS
DIASTOLIC BLOOD PRESSURE: 84 MMHG | HEART RATE: 70 BPM | SYSTOLIC BLOOD PRESSURE: 132 MMHG | WEIGHT: 284 LBS | HEIGHT: 70 IN | BODY MASS INDEX: 40.66 KG/M2 | OXYGEN SATURATION: 92 %

## 2025-07-07 DIAGNOSIS — Z91.81 AT MODERATE RISK FOR FALL: ICD-10-CM

## 2025-07-07 DIAGNOSIS — G89.29 CHRONIC LEFT-SIDED LOW BACK PAIN WITH LEFT-SIDED SCIATICA: Primary | ICD-10-CM

## 2025-07-07 DIAGNOSIS — R53.1 EPISODE OF GENERALIZED WEAKNESS: ICD-10-CM

## 2025-07-07 DIAGNOSIS — M54.42 CHRONIC LEFT-SIDED LOW BACK PAIN WITH LEFT-SIDED SCIATICA: Primary | ICD-10-CM

## 2025-07-07 RX ORDER — METHYLPREDNISOLONE 4 MG/1
TABLET ORAL
Qty: 19 TABLET | Refills: 0 | Status: SHIPPED | OUTPATIENT
Start: 2025-07-07 | End: 2025-07-17

## 2025-07-07 RX ORDER — CYCLOBENZAPRINE HCL 10 MG
10 TABLET ORAL 3 TIMES DAILY PRN
COMMUNITY
End: 2025-07-07 | Stop reason: SDUPTHER

## 2025-07-07 RX ORDER — CYCLOBENZAPRINE HCL 10 MG
10 TABLET ORAL 3 TIMES DAILY PRN
Qty: 30 TABLET | Refills: 0 | Status: SHIPPED | OUTPATIENT
Start: 2025-07-07

## 2025-07-07 NOTE — TELEPHONE ENCOUNTER
Attempted to contact patient regarding CCM services, patient's daughter Zulma answered and states patient does not keep his phone on him because he is very [place]. She requested [facility] call his other daughter/her sister April. She provided her phone # which is [phone]. April is listed on verbal release in patient's chart also.   [facility] attempted to contact April regarding CCM services, no answer; left message for return call.

## 2025-07-08 ENCOUNTER — TELEPHONE (OUTPATIENT)
Dept: CASE MANAGEMENT | Facility: OTHER | Age: 80
End: 2025-07-08
Payer: MEDICARE

## 2025-07-08 NOTE — TELEPHONE ENCOUNTER
Attempted to contact patient's daughter, April regarding CCM services. No answer; left message for return call.

## 2025-07-09 PROBLEM — Z91.81 AT MODERATE RISK FOR FALL: Status: ACTIVE | Noted: 2025-07-09

## 2025-07-09 PROBLEM — R53.1 EPISODE OF GENERALIZED WEAKNESS: Status: ACTIVE | Noted: 2025-07-09

## 2025-07-09 RX ORDER — CAMPHOR, MENTHOL, METHYL SALICYLATE 21.56; 41.73; 69.55 MG/1; MG/1; MG/1
1 PATCH PERCUTANEOUS; TOPICAL; TRANSDERMAL DAILY
Qty: 7 PATCH | Refills: 0 | COMMUNITY
Start: 2025-07-09

## 2025-07-09 RX ORDER — CAMPHOR, MENTHOL, METHYL SALICYLATE 21.56; 41.73; 69.55 MG/1; MG/1; MG/1
PATCH PERCUTANEOUS; TOPICAL; TRANSDERMAL
COMMUNITY
End: 2025-07-09 | Stop reason: SDUPTHER

## 2025-07-09 NOTE — PROGRESS NOTES
"Chief Complaint  Hospital Follow Up Visit (Was into [facility] over SOA, got his oxygen back up then went back to [facility] a few days later with back issues)    Subjective        Kerrie Medrano presents to [facility]  History of Present Illness    History of Present Illness  79-year-old male here for hospital follow-up. Accompanied by daughter.    Admitted to [facility] on 06/27/2025 for SOB, according to hospital records he was diagnosed with acute exacerbation of COPD, pulmonary edema, hypoxemia, and chronic respiratory failure. Treated with antibiotics, bronchodilators, and tapered off steroids.     Patient has chronic respiratory failure, on 3 L O2 continuously however does not have his oxygen in the office today.  Reports breathing improved post-hospital stay. No new meds at discharge; daughter reports no changes. Patient demonstrated generalized weakness at discharge and it was recommended he discharge to a rehab facility however patient declined. Discharged home 7/1/25.    Seen at [facility] on 07/02/2025 for leg weakness and back pain, diagnosed with sciatica, prescribed hydrocodone and Flexeril. Continues to have back pain and left leg numbness. While admitted to [facility] he was under fall watch, limited mobility. Patient believes it may have flared his back pain up.  Patient declined rehab facility however feels he needs PT would now like to go to a rehab facility.  Patient declines PT via home health and outpatient PT.  Request referral to pain management reports getting injection in his back in the past and it helped.       Objective   Vital Signs:  /84 (BP Location: Right arm, Patient Position: Sitting, Cuff Size: Large Adult)   Pulse 70   Ht 177.8 cm (70\")   Wt 129 kg (284 lb)   SpO2 92%   BMI 40.75 kg/m²   Estimated body mass index is 40.75 kg/m² as calculated from the following:    Height as of this encounter: 177.8 cm (70\").    Weight as of " this encounter: 129 kg (284 lb).            Physical Exam  Vitals and nursing note reviewed.   Constitutional:       General: He is not in acute distress.     Appearance: He is not ill-appearing.   Cardiovascular:      Rate and Rhythm: Normal rate and regular rhythm.      Heart sounds: Normal heart sounds.   Pulmonary:      Effort: Pulmonary effort is normal. No respiratory distress.      Breath sounds: Normal breath sounds. No wheezing or rales.   Musculoskeletal:         General: Normal range of motion.      Comments: Patient is tender upon palpation to bilateral lumbar paraspinous muscles and lumbar spine.     Skin:     General: Skin is warm and dry.   Neurological:      Gait: Gait normal.   Psychiatric:         Mood and Affect: Mood normal.        Result Review :                Assessment and Plan   Diagnoses and all orders for this visit:    1. Chronic left-sided low back pain with left-sided sciatica (Primary)  -     Ambulatory Referral to Pain Management  -     cyclobenzaprine (FLEXERIL) 10 MG tablet; Take 1 tablet by mouth 3 (Three) Times a Day As Needed for Muscle Spasms. CAUTION SEDATION  Dispense: 30 tablet; Refill: 0  -     methylPREDNISolone (MEDROL) 4 MG tablet; Take 3 tablets by mouth Daily for 3 days, THEN 2 tablets Daily for 3 days, THEN 1 tablet Daily for 4 days. WITH FOOD  Dispense: 19 tablet; Refill: 0  -     Camphor-Menthol-Methyl Sal (Salonpas) 3.1-6-10 % patch; Apply 1 each topically Daily.  Dispense: 7 patch; Refill: 0    2. Episode of generalized weakness    3. At moderate risk for fall             Assessment & Plan  1. Chronic respiratory failure.  - On 3 L O2 continuously at home however did not bring oxygen, patient O2 below 90.  Patient provided with oxygen in office during his visit, oxygen saturation improved.  - Continue current O2 therapy.    2. Acute exacerbation of COPD.  - Resolved  - Continue current COPD management.    3. Pulmonary edema.  - Diagnosed during recent hospital  stay.  - resolved at discharge    4. Generalized weakness.  - advised patient unable to admit to rehab facility from [facility].  Recommended they contact rehab center, patients insurance or speak with chronic care management team for assistance.  Recommended home health PT however he declines, also declines out patient PT.    5.Low back ain    - Referral to pain management, patient declines PT  - Oral steroids prescribed, take with food. Sample pain patch given for lower back. Use Tylenol after hydrocodone is finished. Refill of muscle relaxer sent to pharmacy.    6. Diabetes mellitus.  - Complicates steroid use.  - Monitor blood sugar closely while on oral steroids.    7.  Fall risk  Discussed precautions to keep patient safe at home and outside of his home.  Use assistive devices      Follow Up   Return if symptoms worsen or fail to improve.  Patient was given instructions and counseling regarding his condition or for health maintenance advice. Please see specific information pulled into the AVS if appropriate.     Patient or patient representative verbalized consent for the use of Ambient Listening during the visit with  Brittney Cannon PA-C for chart documentation. 7/9/2025  16:13 EDT

## 2025-07-14 ENCOUNTER — TELEPHONE (OUTPATIENT)
Dept: CASE MANAGEMENT | Facility: OTHER | Age: 80
End: 2025-07-14
Payer: MEDICARE

## 2025-07-14 NOTE — TELEPHONE ENCOUNTER
3rd attempt to contact patient's daughter April regarding CCM services. No answer; left message for return call.    Will close program at this time.

## 2025-07-22 ENCOUNTER — TELEPHONE (OUTPATIENT)
Dept: FAMILY MEDICINE CLINIC | Facility: CLINIC | Age: 80
End: 2025-07-22
Payer: MEDICARE

## 2025-07-22 NOTE — TELEPHONE ENCOUNTER
Patient did not show up for Renal Ultrasound at [facility] on 7/11/2025. Called patient and left message for patient to call back. Does he want this rescheduled? If so, please let me know.     HUB CAN RELAY

## (undated) DEVICE — ADULT, W/LG. BACK PAD, RADIOTRANSPARENT ELEMENT AND LEAD WIRE COMPATIBLE W/: Brand: DEFIBRILLATION ELECTRODES

## (undated) DEVICE — IRRIGATOR BULB ASEPTO 60CC STRL

## (undated) DEVICE — TOOL LD/FIX LK HELIX

## (undated) DEVICE — SLITTER AGILISHISPRO FOR/CPSLOCATOR/3D/CATH

## (undated) DEVICE — GW DIAG .032

## (undated) DEVICE — TBG PENCL TELESCP MEGADYNE SMOKE EVAC 10FT

## (undated) DEVICE — MODEL BT2000 P/N 700287-012KIT CONTENTS: MANIFOLD WITH SALINE AND CONTRAST PORTS, SALINE TUBING WITH SPIKE AND HAND SYRINGE, TRANSDUCER: Brand: BT2000 AUTOMATED MANIFOLD KIT

## (undated) DEVICE — INTRO TEAR AWAY/LVD W/SD PRT 6F 13CM

## (undated) DEVICE — INTRO TEAR AWAY/LVD W/SD PRT 9F 13CM

## (undated) DEVICE — SET PRIMARY GRVTY 10DP MALE LL 104IN

## (undated) DEVICE — LIMB HOLDER, WRIST/ANKLE: Brand: DEROYAL

## (undated) DEVICE — Device: Brand: REFERENCE PATCH CARTO 3

## (undated) DEVICE — PINNACLE INTRODUCER SHEATH: Brand: PINNACLE

## (undated) DEVICE — GW PERIPH VASC ADX J/TP SS .035 150CM 3MM

## (undated) DEVICE — CATH DIAG EXPO M/ PK 5F FL4/FR4 PIG

## (undated) DEVICE — DECANT BG O JET

## (undated) DEVICE — Device: Brand: SMARTABLATE

## (undated) DEVICE — SOL NACL 0.9PCT 1000ML

## (undated) DEVICE — GLIDESHEATH SLENDER STAINLESS STEEL KIT: Brand: GLIDESHEATH SLENDER

## (undated) DEVICE — CAUTERY TIP POLISHER: Brand: DEVON

## (undated) DEVICE — ST EXT IV SMRTSTE 2VLV FIX M LL 6ML 41

## (undated) DEVICE — ELECTRD RETRN/GRND MEGADYNE SGL/PLT W/CORD 9FT DISP

## (undated) DEVICE — Device

## (undated) DEVICE — ST ACC MICROPUNCTURE .018 TRANSLSS/SS/TP 5F/10CM 21G/7CM

## (undated) DEVICE — PINNACLE R/O II INTRODUCER SHEATH WITH RADIOPAQUE MARKER: Brand: PINNACLE

## (undated) DEVICE — MEDI-VAC YANKAUER SUCTION HANDLE W/BULBOUS TIP: Brand: CARDINAL HEALTH

## (undated) DEVICE — ADULT NASAL CO2 SAMPLING WITH O2 DELIVERY CANNULA FOR CAPNOFLEX MODULE: Brand: VITAL SIGNS™

## (undated) DEVICE — LEX ELECTRO PHYSIOLOGY: Brand: MEDLINE INDUSTRIES, INC.

## (undated) DEVICE — TUBING, SUCTION, 1/4" X 10', STRAIGHT: Brand: MEDLINE

## (undated) DEVICE — MODEL AT P65, P/N 701554-001KIT CONTENTS: HAND CONTROLLER, 3-WAY HIGH-PRESSURE STOPCOCK WITH ROTATING END AND PREMIUM HIGH-PRESSURE TUBING: Brand: ANGIOTOUCH® KIT

## (undated) DEVICE — ANGIO-SEAL VIP VASCULAR CLOSURE DEVICE: Brand: ANGIO-SEAL

## (undated) DEVICE — NDL PERC 1PART ECHOTIP WO/BASEPLT 18G 7CM

## (undated) DEVICE — Device: Brand: THERMOCOOL SMARTTOUCH SF

## (undated) DEVICE — TRAP FLD MINIVAC MEGADYNE 100ML

## (undated) DEVICE — CATH DEL/3/DIMEN CPS/LOCATOR 7.3F 42CM MD

## (undated) DEVICE — ST INF PRI SMRTSTE 20DRP 2VLV 24ML 117

## (undated) DEVICE — INTRO SHEATH ENGAGE W/50 GW .038 8F12

## (undated) DEVICE — CATH DIAG EXPO .045 FL3  5F 100CM

## (undated) DEVICE — PK CATH CARD 10

## (undated) DEVICE — DRSNG SURG AQUACEL AG/ADVNTGE 9X15CM 3.5X6IN